# Patient Record
Sex: FEMALE | Race: WHITE | Employment: OTHER | URBAN - METROPOLITAN AREA
[De-identification: names, ages, dates, MRNs, and addresses within clinical notes are randomized per-mention and may not be internally consistent; named-entity substitution may affect disease eponyms.]

---

## 2017-01-11 RX ORDER — FERROUS SULFATE 325(65) MG
TABLET ORAL
Qty: 56 TABLET | Refills: 3 | Status: SHIPPED | OUTPATIENT
Start: 2017-01-11 | End: 2017-03-12

## 2017-01-17 RX ORDER — OMEPRAZOLE 40 MG/1
40 CAPSULE, DELAYED RELEASE ORAL DAILY
Qty: 30 CAPSULE | Refills: 5 | Status: SHIPPED | OUTPATIENT
Start: 2017-01-17 | End: 2017-01-19 | Stop reason: SDUPTHER

## 2017-01-19 RX ORDER — MECLIZINE HCL 12.5 MG/1
12.5 TABLET ORAL 3 TIMES DAILY PRN
Qty: 30 TABLET | Refills: 0 | Status: SHIPPED | OUTPATIENT
Start: 2017-01-19 | End: 2018-06-18 | Stop reason: CLARIF

## 2017-01-19 RX ORDER — OMEPRAZOLE 40 MG/1
40 CAPSULE, DELAYED RELEASE ORAL DAILY
Qty: 30 CAPSULE | Refills: 5 | Status: SHIPPED | OUTPATIENT
Start: 2017-01-19 | End: 2017-04-07 | Stop reason: SDUPTHER

## 2017-01-19 RX ORDER — NICOTINE 21 MG/24HR
1 PATCH, TRANSDERMAL 24 HOURS TRANSDERMAL EVERY 24 HOURS
Qty: 30 PATCH | Refills: 5 | Status: SHIPPED | OUTPATIENT
Start: 2017-01-19 | End: 2017-04-07

## 2017-02-13 RX ORDER — ERGOCALCIFEROL 1.25 MG/1
50000 CAPSULE ORAL WEEKLY
Qty: 13 CAPSULE | Refills: 0 | Status: SHIPPED | OUTPATIENT
Start: 2017-02-13 | End: 2017-03-12

## 2017-03-10 RX ORDER — BUDESONIDE AND FORMOTEROL FUMARATE DIHYDRATE 160; 4.5 UG/1; UG/1
AEROSOL RESPIRATORY (INHALATION)
Qty: 1 INHALER | Refills: 5 | Status: SHIPPED | OUTPATIENT
Start: 2017-03-10 | End: 2017-09-01 | Stop reason: SDUPTHER

## 2017-03-14 RX ORDER — ROPINIROLE 2 MG/1
TABLET, FILM COATED ORAL
Qty: 56 TABLET | Refills: 5 | Status: SHIPPED | OUTPATIENT
Start: 2017-03-14 | End: 2017-04-11 | Stop reason: SDUPTHER

## 2017-03-21 ENCOUNTER — OFFICE VISIT (OUTPATIENT)
Dept: FAMILY MEDICINE CLINIC | Age: 57
End: 2017-03-21

## 2017-03-21 VITALS
SYSTOLIC BLOOD PRESSURE: 110 MMHG | DIASTOLIC BLOOD PRESSURE: 52 MMHG | WEIGHT: 159.8 LBS | OXYGEN SATURATION: 98 % | HEART RATE: 72 BPM | BODY MASS INDEX: 31.21 KG/M2

## 2017-03-21 DIAGNOSIS — Z72.0 TOBACCO ABUSE: ICD-10-CM

## 2017-03-21 DIAGNOSIS — E11.00 UNCONTROLLED TYPE 2 DIABETES MELLITUS WITH HYPEROSMOLARITY WITHOUT COMA, WITHOUT LONG-TERM CURRENT USE OF INSULIN (HCC): ICD-10-CM

## 2017-03-21 DIAGNOSIS — I10 ESSENTIAL HYPERTENSION: ICD-10-CM

## 2017-03-21 DIAGNOSIS — G43.109 MIGRAINE WITH AURA AND WITHOUT STATUS MIGRAINOSUS, NOT INTRACTABLE: Primary | ICD-10-CM

## 2017-03-21 DIAGNOSIS — E03.9 ACQUIRED HYPOTHYROIDISM: ICD-10-CM

## 2017-03-21 LAB
ANION GAP SERPL CALCULATED.3IONS-SCNC: 15 MMOL/L (ref 3–16)
BUN BLDV-MCNC: 11 MG/DL (ref 7–20)
CALCIUM SERPL-MCNC: 10.2 MG/DL (ref 8.3–10.6)
CHLORIDE BLD-SCNC: 97 MMOL/L (ref 99–110)
CO2: 24 MMOL/L (ref 21–32)
CREAT SERPL-MCNC: 0.7 MG/DL (ref 0.6–1.1)
GFR AFRICAN AMERICAN: >60
GFR NON-AFRICAN AMERICAN: >60
GLUCOSE BLD-MCNC: 96 MG/DL (ref 70–99)
HCT VFR BLD CALC: 39.4 % (ref 36–48)
HEMOGLOBIN: 12.9 G/DL (ref 12–16)
MCH RBC QN AUTO: 29.6 PG (ref 26–34)
MCHC RBC AUTO-ENTMCNC: 32.7 G/DL (ref 31–36)
MCV RBC AUTO: 90.4 FL (ref 80–100)
PDW BLD-RTO: 13.5 % (ref 12.4–15.4)
PLATELET # BLD: 357 K/UL (ref 135–450)
PMV BLD AUTO: 7.6 FL (ref 5–10.5)
POTASSIUM SERPL-SCNC: 4.9 MMOL/L (ref 3.5–5.1)
RBC # BLD: 4.36 M/UL (ref 4–5.2)
SODIUM BLD-SCNC: 136 MMOL/L (ref 136–145)
TSH SERPL DL<=0.05 MIU/L-ACNC: 3.49 UIU/ML (ref 0.27–4.2)
WBC # BLD: 9.3 K/UL (ref 4–11)

## 2017-03-21 PROCEDURE — 99214 OFFICE O/P EST MOD 30 MIN: CPT | Performed by: INTERNAL MEDICINE

## 2017-03-21 PROCEDURE — 36415 COLL VENOUS BLD VENIPUNCTURE: CPT | Performed by: INTERNAL MEDICINE

## 2017-03-21 RX ORDER — AMLODIPINE BESYLATE 2.5 MG/1
2.5 TABLET ORAL DAILY
Qty: 30 TABLET | Refills: 3 | Status: SHIPPED | OUTPATIENT
Start: 2017-03-21 | End: 2017-06-09 | Stop reason: SDUPTHER

## 2017-03-21 RX ORDER — LEVOTHYROXINE SODIUM 0.07 MG/1
TABLET ORAL
Qty: 90 TABLET | Refills: 0 | Status: SHIPPED | OUTPATIENT
Start: 2017-03-21 | End: 2017-06-26 | Stop reason: SDUPTHER

## 2017-03-21 ASSESSMENT — ENCOUNTER SYMPTOMS
GASTROINTESTINAL NEGATIVE: 1
ALLERGIC/IMMUNOLOGIC NEGATIVE: 1
COUGH: 1

## 2017-03-22 LAB
ESTIMATED AVERAGE GLUCOSE: 171.4 MG/DL
HBA1C MFR BLD: 7.6 %

## 2017-04-05 RX ORDER — PRAVASTATIN SODIUM 40 MG
40 TABLET ORAL DAILY
Qty: 30 TABLET | Refills: 1 | Status: SHIPPED | OUTPATIENT
Start: 2017-04-05 | End: 2017-06-26 | Stop reason: SDUPTHER

## 2017-04-07 RX ORDER — OMEPRAZOLE 40 MG/1
40 CAPSULE, DELAYED RELEASE ORAL 2 TIMES DAILY
Qty: 60 CAPSULE | Refills: 5 | Status: SHIPPED | OUTPATIENT
Start: 2017-04-07 | End: 2018-01-31 | Stop reason: SDUPTHER

## 2017-04-11 ENCOUNTER — OFFICE VISIT (OUTPATIENT)
Dept: PULMONOLOGY | Age: 57
End: 2017-04-11

## 2017-04-11 VITALS
TEMPERATURE: 98.1 F | SYSTOLIC BLOOD PRESSURE: 136 MMHG | HEIGHT: 60 IN | DIASTOLIC BLOOD PRESSURE: 64 MMHG | BODY MASS INDEX: 31.49 KG/M2 | OXYGEN SATURATION: 98 % | WEIGHT: 160.4 LBS | HEART RATE: 72 BPM | RESPIRATION RATE: 14 BRPM

## 2017-04-11 DIAGNOSIS — J43.9 ACUTE EXACERBATION OF EMPHYSEMA (HCC): Primary | ICD-10-CM

## 2017-04-11 PROCEDURE — 99214 OFFICE O/P EST MOD 30 MIN: CPT | Performed by: INTERNAL MEDICINE

## 2017-04-11 RX ORDER — ROPINIROLE 2 MG/1
TABLET, FILM COATED ORAL
Qty: 60 TABLET | Refills: 5 | Status: SHIPPED | OUTPATIENT
Start: 2017-04-11 | End: 2018-01-03 | Stop reason: SDUPTHER

## 2017-04-11 RX ORDER — PREDNISONE 10 MG/1
TABLET ORAL
Qty: 30 TABLET | Refills: 0 | Status: SHIPPED | OUTPATIENT
Start: 2017-04-11 | End: 2017-08-21

## 2017-04-11 RX ORDER — DOXYCYCLINE HYCLATE 100 MG
100 TABLET ORAL 2 TIMES DAILY
Qty: 20 TABLET | Refills: 0 | Status: SHIPPED | OUTPATIENT
Start: 2017-04-11 | End: 2017-04-21

## 2017-05-12 ENCOUNTER — OFFICE VISIT (OUTPATIENT)
Dept: FAMILY MEDICINE CLINIC | Age: 57
End: 2017-05-12

## 2017-05-12 VITALS
SYSTOLIC BLOOD PRESSURE: 154 MMHG | DIASTOLIC BLOOD PRESSURE: 70 MMHG | BODY MASS INDEX: 31.79 KG/M2 | HEART RATE: 72 BPM | WEIGHT: 162.8 LBS | RESPIRATION RATE: 18 BRPM

## 2017-05-12 DIAGNOSIS — E78.2 MIXED HYPERLIPIDEMIA: ICD-10-CM

## 2017-05-12 DIAGNOSIS — F33.1 MODERATE EPISODE OF RECURRENT MAJOR DEPRESSIVE DISORDER (HCC): ICD-10-CM

## 2017-05-12 DIAGNOSIS — J43.2 CENTRILOBULAR EMPHYSEMA (HCC): ICD-10-CM

## 2017-05-12 DIAGNOSIS — E03.9 ACQUIRED HYPOTHYROIDISM: ICD-10-CM

## 2017-05-12 DIAGNOSIS — I77.9 BILATERAL CAROTID ARTERY DISEASE (HCC): ICD-10-CM

## 2017-05-12 DIAGNOSIS — E11.00 UNCONTROLLED TYPE 2 DIABETES MELLITUS WITH HYPEROSMOLARITY WITHOUT COMA, WITHOUT LONG-TERM CURRENT USE OF INSULIN (HCC): Primary | ICD-10-CM

## 2017-05-12 DIAGNOSIS — I10 ESSENTIAL HYPERTENSION: ICD-10-CM

## 2017-05-12 LAB
ALBUMIN SERPL-MCNC: 4.6 G/DL (ref 3.4–5)
ALP BLD-CCNC: 70 U/L (ref 40–129)
ALT SERPL-CCNC: 14 U/L (ref 10–40)
ANION GAP SERPL CALCULATED.3IONS-SCNC: 13 MMOL/L (ref 3–16)
AST SERPL-CCNC: 13 U/L (ref 15–37)
BILIRUB SERPL-MCNC: <0.2 MG/DL (ref 0–1)
BILIRUBIN DIRECT: <0.2 MG/DL (ref 0–0.3)
BILIRUBIN, INDIRECT: ABNORMAL MG/DL (ref 0–1)
BUN BLDV-MCNC: 12 MG/DL (ref 7–20)
CALCIUM SERPL-MCNC: 9.8 MG/DL (ref 8.3–10.6)
CHLORIDE BLD-SCNC: 101 MMOL/L (ref 99–110)
CHOLESTEROL, TOTAL: 225 MG/DL (ref 0–199)
CO2: 24 MMOL/L (ref 21–32)
CREAT SERPL-MCNC: 0.8 MG/DL (ref 0.6–1.1)
GFR AFRICAN AMERICAN: >60
GFR NON-AFRICAN AMERICAN: >60
GLUCOSE BLD-MCNC: 123 MG/DL (ref 70–99)
HDLC SERPL-MCNC: 58 MG/DL (ref 40–60)
LDL CHOLESTEROL CALCULATED: ABNORMAL MG/DL
LDL CHOLESTEROL DIRECT: 119 MG/DL
POTASSIUM SERPL-SCNC: 4.6 MMOL/L (ref 3.5–5.1)
SODIUM BLD-SCNC: 138 MMOL/L (ref 136–145)
TOTAL PROTEIN: 7.1 G/DL (ref 6.4–8.2)
TRIGL SERPL-MCNC: 352 MG/DL (ref 0–150)
VLDLC SERPL CALC-MCNC: ABNORMAL MG/DL

## 2017-05-12 PROCEDURE — 36415 COLL VENOUS BLD VENIPUNCTURE: CPT | Performed by: INTERNAL MEDICINE

## 2017-05-12 PROCEDURE — 99214 OFFICE O/P EST MOD 30 MIN: CPT | Performed by: INTERNAL MEDICINE

## 2017-05-12 RX ORDER — LOSARTAN POTASSIUM 50 MG/1
50 TABLET ORAL DAILY
Qty: 30 TABLET | Refills: 5 | Status: SHIPPED | OUTPATIENT
Start: 2017-05-12 | End: 2017-06-27 | Stop reason: SDUPTHER

## 2017-05-12 ASSESSMENT — ENCOUNTER SYMPTOMS
BACK PAIN: 1
RESPIRATORY NEGATIVE: 1
RHINORRHEA: 1
SINUS PRESSURE: 1
ALLERGIC/IMMUNOLOGIC NEGATIVE: 1

## 2017-05-13 LAB
ESTIMATED AVERAGE GLUCOSE: 139.9 MG/DL
HBA1C MFR BLD: 6.5 %

## 2017-05-19 ENCOUNTER — OFFICE VISIT (OUTPATIENT)
Dept: PULMONOLOGY | Age: 57
End: 2017-05-19

## 2017-05-19 VITALS
HEIGHT: 60 IN | DIASTOLIC BLOOD PRESSURE: 62 MMHG | HEART RATE: 68 BPM | OXYGEN SATURATION: 98 % | WEIGHT: 161 LBS | TEMPERATURE: 98 F | BODY MASS INDEX: 31.61 KG/M2 | SYSTOLIC BLOOD PRESSURE: 112 MMHG

## 2017-05-19 DIAGNOSIS — J43.9 PULMONARY EMPHYSEMA, UNSPECIFIED EMPHYSEMA TYPE (HCC): Primary | ICD-10-CM

## 2017-05-19 DIAGNOSIS — G25.81 RLS (RESTLESS LEGS SYNDROME): ICD-10-CM

## 2017-05-19 PROCEDURE — 99214 OFFICE O/P EST MOD 30 MIN: CPT | Performed by: INTERNAL MEDICINE

## 2017-06-11 RX ORDER — AMLODIPINE BESYLATE 5 MG/1
5 TABLET ORAL DAILY
Qty: 30 TABLET | Refills: 5 | Status: SHIPPED | OUTPATIENT
Start: 2017-06-11 | End: 2020-10-19

## 2017-06-26 RX ORDER — PRAVASTATIN SODIUM 40 MG
TABLET ORAL
Qty: 30 TABLET | Refills: 5 | Status: SHIPPED | OUTPATIENT
Start: 2017-06-26 | End: 2018-01-31 | Stop reason: SDUPTHER

## 2017-06-26 RX ORDER — FERROUS SULFATE 325(65) MG
TABLET ORAL
Qty: 56 TABLET | OUTPATIENT
Start: 2017-06-26

## 2017-06-26 RX ORDER — ERGOCALCIFEROL 1.25 MG/1
CAPSULE ORAL
Qty: 4 CAPSULE | Refills: 5 | Status: SHIPPED | OUTPATIENT
Start: 2017-06-26 | End: 2017-08-21

## 2017-06-26 RX ORDER — LEVOTHYROXINE SODIUM 0.07 MG/1
TABLET ORAL
Qty: 30 TABLET | Refills: 5 | Status: SHIPPED | OUTPATIENT
Start: 2017-06-26 | End: 2018-06-06 | Stop reason: SDUPTHER

## 2017-06-27 ENCOUNTER — OFFICE VISIT (OUTPATIENT)
Dept: FAMILY MEDICINE CLINIC | Age: 57
End: 2017-06-27

## 2017-06-27 VITALS
RESPIRATION RATE: 18 BRPM | HEART RATE: 75 BPM | DIASTOLIC BLOOD PRESSURE: 72 MMHG | HEIGHT: 60 IN | WEIGHT: 163.2 LBS | OXYGEN SATURATION: 98 % | SYSTOLIC BLOOD PRESSURE: 142 MMHG | BODY MASS INDEX: 32.04 KG/M2

## 2017-06-27 DIAGNOSIS — E11.00 UNCONTROLLED TYPE 2 DIABETES MELLITUS WITH HYPEROSMOLARITY WITHOUT COMA, WITHOUT LONG-TERM CURRENT USE OF INSULIN (HCC): ICD-10-CM

## 2017-06-27 DIAGNOSIS — M54.2 NECK PAIN OF OVER 3 MONTHS DURATION: ICD-10-CM

## 2017-06-27 DIAGNOSIS — Z72.0 TOBACCO ABUSE: ICD-10-CM

## 2017-06-27 DIAGNOSIS — I10 ESSENTIAL HYPERTENSION: Primary | ICD-10-CM

## 2017-06-27 PROCEDURE — 99214 OFFICE O/P EST MOD 30 MIN: CPT | Performed by: INTERNAL MEDICINE

## 2017-06-27 RX ORDER — LOSARTAN POTASSIUM 100 MG/1
100 TABLET ORAL DAILY
Qty: 30 TABLET | Refills: 5 | Status: SHIPPED | OUTPATIENT
Start: 2017-06-27 | End: 2018-06-18 | Stop reason: CLARIF

## 2017-06-27 RX ORDER — DIPHENHYD/PHENYLEPH/ACETAMINOP 12.5-5-325
TABLET ORAL
Qty: 1 KIT | Refills: 0 | Status: SHIPPED | OUTPATIENT
Start: 2017-06-27 | End: 2017-12-12

## 2017-06-27 ASSESSMENT — ENCOUNTER SYMPTOMS
RESPIRATORY NEGATIVE: 1
ALLERGIC/IMMUNOLOGIC NEGATIVE: 1

## 2017-07-10 ENCOUNTER — HOSPITAL ENCOUNTER (OUTPATIENT)
Dept: PHYSICAL THERAPY | Age: 57
Discharge: HOME | End: 2017-07-31
Admitting: INTERNAL MEDICINE

## 2017-07-10 ASSESSMENT — PAIN DESCRIPTION - LOCATION: LOCATION: NECK

## 2017-07-10 ASSESSMENT — PAIN DESCRIPTION - ORIENTATION: ORIENTATION: RIGHT

## 2017-07-10 ASSESSMENT — PAIN SCALES - GENERAL: PAINLEVEL_OUTOF10: 4

## 2017-07-10 ASSESSMENT — PAIN DESCRIPTION - PAIN TYPE: TYPE: CHRONIC PAIN

## 2017-07-10 ASSESSMENT — PAIN DESCRIPTION - DESCRIPTORS: DESCRIPTORS: SHOOTING

## 2017-07-13 ENCOUNTER — HOSPITAL ENCOUNTER (OUTPATIENT)
Dept: PHYSICAL THERAPY | Age: 57
Discharge: HOME | End: 2017-07-13
Admitting: INTERNAL MEDICINE

## 2017-07-17 ENCOUNTER — HOSPITAL ENCOUNTER (OUTPATIENT)
Dept: PHYSICAL THERAPY | Age: 57
Discharge: HOME | End: 2017-07-17
Admitting: INTERNAL MEDICINE

## 2017-07-19 ENCOUNTER — HOSPITAL ENCOUNTER (OUTPATIENT)
Dept: PHYSICAL THERAPY | Age: 57
Discharge: HOME | End: 2017-07-19
Admitting: INTERNAL MEDICINE

## 2017-08-08 ENCOUNTER — TELEPHONE (OUTPATIENT)
Dept: FAMILY MEDICINE CLINIC | Age: 57
End: 2017-08-08

## 2017-08-08 DIAGNOSIS — G45.9 TRANSIENT CEREBRAL ISCHEMIA, UNSPECIFIED TYPE: ICD-10-CM

## 2017-08-08 DIAGNOSIS — M54.2 NECK PAIN OF OVER 3 MONTHS DURATION: ICD-10-CM

## 2017-08-08 DIAGNOSIS — R51.9 NONINTRACTABLE HEADACHE, UNSPECIFIED CHRONICITY PATTERN, UNSPECIFIED HEADACHE TYPE: Primary | ICD-10-CM

## 2017-08-21 ENCOUNTER — INITIAL CONSULT (OUTPATIENT)
Dept: NEUROLOGY | Age: 57
End: 2017-08-21

## 2017-08-21 VITALS
BODY MASS INDEX: 31.61 KG/M2 | HEART RATE: 77 BPM | DIASTOLIC BLOOD PRESSURE: 67 MMHG | WEIGHT: 161 LBS | HEIGHT: 60 IN | SYSTOLIC BLOOD PRESSURE: 128 MMHG | OXYGEN SATURATION: 98 %

## 2017-08-21 DIAGNOSIS — F34.1 DYSTHYMIA: ICD-10-CM

## 2017-08-21 DIAGNOSIS — G44.86 HEADACHE, CERVICOGENIC: ICD-10-CM

## 2017-08-21 DIAGNOSIS — M50.920 CERVICAL DISC DISORDER OF MID-CERVICAL REGION: ICD-10-CM

## 2017-08-21 DIAGNOSIS — I10 HTN (HYPERTENSION), BENIGN: ICD-10-CM

## 2017-08-21 DIAGNOSIS — G44.52 NEW PERSISTENT DAILY HEADACHE: Primary | ICD-10-CM

## 2017-08-21 PROCEDURE — 99215 OFFICE O/P EST HI 40 MIN: CPT | Performed by: PSYCHIATRY & NEUROLOGY

## 2017-08-21 ASSESSMENT — ENCOUNTER SYMPTOMS
ABDOMINAL PAIN: 1
NAUSEA: 1
DOUBLE VISION: 0
BLURRED VISION: 0
HEMOPTYSIS: 0
DIARRHEA: 1
BACK PAIN: 1
PHOTOPHOBIA: 0
COUGH: 0
VOMITING: 0
CONSTIPATION: 0

## 2017-08-29 ENCOUNTER — HOSPITAL ENCOUNTER (OUTPATIENT)
Dept: MRI IMAGING | Age: 57
Discharge: HOME | End: 2017-08-29
Attending: PSYCHIATRY & NEUROLOGY | Admitting: PSYCHIATRY & NEUROLOGY

## 2017-08-29 DIAGNOSIS — G44.86 HEADACHE, CERVICOGENIC: ICD-10-CM

## 2017-08-29 DIAGNOSIS — M50.920 CERVICAL DISC DISORDER OF MID-CERVICAL REGION: ICD-10-CM

## 2017-08-29 DIAGNOSIS — G44.52 NEW PERSISTENT DAILY HEADACHE: ICD-10-CM

## 2017-08-29 DIAGNOSIS — G44.52 NEW DAILY PERSISTENT HEADACHE (NDPH): ICD-10-CM

## 2017-09-01 RX ORDER — BUDESONIDE AND FORMOTEROL FUMARATE DIHYDRATE 160; 4.5 UG/1; UG/1
AEROSOL RESPIRATORY (INHALATION)
Qty: 1 INHALER | Refills: 5 | Status: SHIPPED | OUTPATIENT
Start: 2017-09-01 | End: 2018-03-23 | Stop reason: SDUPTHER

## 2017-09-20 ENCOUNTER — HOSPITAL ENCOUNTER (OUTPATIENT)
Dept: MAMMOGRAPHY | Age: 57
Discharge: HOME | End: 2017-09-20
Attending: INTERNAL MEDICINE | Admitting: INTERNAL MEDICINE

## 2017-09-20 DIAGNOSIS — Z12.31 ENCOUNTER FOR SCREENING MAMMOGRAM FOR MALIGNANT NEOPLASM OF BREAST: ICD-10-CM

## 2017-09-25 ENCOUNTER — OFFICE VISIT (OUTPATIENT)
Dept: NEUROLOGY | Age: 57
End: 2017-09-25

## 2017-09-25 ENCOUNTER — TELEPHONE (OUTPATIENT)
Dept: FAMILY MEDICINE CLINIC | Age: 57
End: 2017-09-25

## 2017-09-25 VITALS
OXYGEN SATURATION: 98 % | HEIGHT: 61 IN | BODY MASS INDEX: 30.96 KG/M2 | HEART RATE: 66 BPM | WEIGHT: 164 LBS | SYSTOLIC BLOOD PRESSURE: 146 MMHG | DIASTOLIC BLOOD PRESSURE: 69 MMHG

## 2017-09-25 DIAGNOSIS — E78.5 DYSLIPIDEMIA: ICD-10-CM

## 2017-09-25 DIAGNOSIS — I10 HTN (HYPERTENSION), BENIGN: ICD-10-CM

## 2017-09-25 DIAGNOSIS — G44.52 NEW PERSISTENT DAILY HEADACHE: Primary | ICD-10-CM

## 2017-09-25 DIAGNOSIS — G44.86 HEADACHE, CERVICOGENIC: ICD-10-CM

## 2017-09-25 DIAGNOSIS — F34.1 DYSTHYMIA: ICD-10-CM

## 2017-09-25 PROCEDURE — 99214 OFFICE O/P EST MOD 30 MIN: CPT | Performed by: PSYCHIATRY & NEUROLOGY

## 2017-09-25 RX ORDER — TIZANIDINE 4 MG/1
4 TABLET ORAL NIGHTLY
Qty: 30 TABLET | Refills: 1 | Status: SHIPPED | OUTPATIENT
Start: 2017-09-25 | End: 2018-06-18 | Stop reason: CLARIF

## 2017-09-25 ASSESSMENT — ENCOUNTER SYMPTOMS
DOUBLE VISION: 0
COUGH: 0
NAUSEA: 0
VOMITING: 0
BACK PAIN: 1
ABDOMINAL PAIN: 0

## 2017-09-28 ENCOUNTER — OFFICE VISIT (OUTPATIENT)
Dept: FAMILY MEDICINE CLINIC | Age: 57
End: 2017-09-28

## 2017-09-28 VITALS
RESPIRATION RATE: 16 BRPM | HEIGHT: 61 IN | DIASTOLIC BLOOD PRESSURE: 74 MMHG | SYSTOLIC BLOOD PRESSURE: 132 MMHG | BODY MASS INDEX: 31.15 KG/M2 | OXYGEN SATURATION: 98 % | HEART RATE: 67 BPM | WEIGHT: 165 LBS

## 2017-09-28 DIAGNOSIS — E11.00 UNCONTROLLED TYPE 2 DIABETES MELLITUS WITH HYPEROSMOLARITY WITHOUT COMA, WITHOUT LONG-TERM CURRENT USE OF INSULIN (HCC): Primary | ICD-10-CM

## 2017-09-28 DIAGNOSIS — M54.2 NECK PAIN OF OVER 3 MONTHS DURATION: ICD-10-CM

## 2017-09-28 DIAGNOSIS — G89.29 CHRONIC HIP PAIN, UNSPECIFIED LATERALITY: ICD-10-CM

## 2017-09-28 DIAGNOSIS — E03.9 ACQUIRED HYPOTHYROIDISM: ICD-10-CM

## 2017-09-28 DIAGNOSIS — M25.559 CHRONIC HIP PAIN, UNSPECIFIED LATERALITY: ICD-10-CM

## 2017-09-28 DIAGNOSIS — M54.50 CHRONIC BILATERAL LOW BACK PAIN WITHOUT SCIATICA: ICD-10-CM

## 2017-09-28 DIAGNOSIS — J43.2 CENTRILOBULAR EMPHYSEMA (HCC): ICD-10-CM

## 2017-09-28 DIAGNOSIS — I77.9 BILATERAL CAROTID ARTERY DISEASE (HCC): ICD-10-CM

## 2017-09-28 DIAGNOSIS — G89.29 CHRONIC BILATERAL LOW BACK PAIN WITHOUT SCIATICA: ICD-10-CM

## 2017-09-28 DIAGNOSIS — F33.1 MODERATE EPISODE OF RECURRENT MAJOR DEPRESSIVE DISORDER (HCC): ICD-10-CM

## 2017-09-28 DIAGNOSIS — I10 ESSENTIAL HYPERTENSION: ICD-10-CM

## 2017-09-28 LAB
ANION GAP SERPL CALCULATED.3IONS-SCNC: 13 MMOL/L (ref 3–16)
BUN BLDV-MCNC: 7 MG/DL (ref 7–20)
CALCIUM SERPL-MCNC: 9.8 MG/DL (ref 8.3–10.6)
CHLORIDE BLD-SCNC: 101 MMOL/L (ref 99–110)
CO2: 24 MMOL/L (ref 21–32)
CREAT SERPL-MCNC: 0.6 MG/DL (ref 0.6–1.1)
GFR AFRICAN AMERICAN: >60
GFR NON-AFRICAN AMERICAN: >60
GLUCOSE BLD-MCNC: 113 MG/DL (ref 70–99)
POTASSIUM SERPL-SCNC: 4.7 MMOL/L (ref 3.5–5.1)
SODIUM BLD-SCNC: 138 MMOL/L (ref 136–145)

## 2017-09-28 PROCEDURE — 99214 OFFICE O/P EST MOD 30 MIN: CPT | Performed by: INTERNAL MEDICINE

## 2017-09-28 PROCEDURE — 36415 COLL VENOUS BLD VENIPUNCTURE: CPT | Performed by: INTERNAL MEDICINE

## 2017-09-28 PROCEDURE — 96160 PT-FOCUSED HLTH RISK ASSMT: CPT | Performed by: INTERNAL MEDICINE

## 2017-09-28 ASSESSMENT — PATIENT HEALTH QUESTIONNAIRE - PHQ9
9. THOUGHTS THAT YOU WOULD BE BETTER OFF DEAD, OR OF HURTING YOURSELF: 0
SUM OF ALL RESPONSES TO PHQ QUESTIONS 1-9: 15
7. TROUBLE CONCENTRATING ON THINGS, SUCH AS READING THE NEWSPAPER OR WATCHING TELEVISION: 0
2. FEELING DOWN, DEPRESSED OR HOPELESS: 3
5. POOR APPETITE OR OVEREATING: 3
10. IF YOU CHECKED OFF ANY PROBLEMS, HOW DIFFICULT HAVE THESE PROBLEMS MADE IT FOR YOU TO DO YOUR WORK, TAKE CARE OF THINGS AT HOME, OR GET ALONG WITH OTHER PEOPLE: 2
4. FEELING TIRED OR HAVING LITTLE ENERGY: 3
1. LITTLE INTEREST OR PLEASURE IN DOING THINGS: 3
3. TROUBLE FALLING OR STAYING ASLEEP: 3
6. FEELING BAD ABOUT YOURSELF - OR THAT YOU ARE A FAILURE OR HAVE LET YOURSELF OR YOUR FAMILY DOWN: 0
8. MOVING OR SPEAKING SO SLOWLY THAT OTHER PEOPLE COULD HAVE NOTICED. OR THE OPPOSITE, BEING SO FIGETY OR RESTLESS THAT YOU HAVE BEEN MOVING AROUND A LOT MORE THAN USUAL: 0
SUM OF ALL RESPONSES TO PHQ9 QUESTIONS 1 & 2: 6

## 2017-09-28 ASSESSMENT — ENCOUNTER SYMPTOMS
RESPIRATORY NEGATIVE: 1
ALLERGIC/IMMUNOLOGIC NEGATIVE: 1

## 2017-09-29 LAB
ESTIMATED AVERAGE GLUCOSE: 154.2 MG/DL
HBA1C MFR BLD: 7 %

## 2017-10-03 NOTE — TELEPHONE ENCOUNTER
Left message for patient to return my call. I need the pain mgmt office phone number and office location to note in patient's record. Only a fax number was provided.

## 2017-11-07 ENCOUNTER — OFFICE VISIT (OUTPATIENT)
Dept: FAMILY MEDICINE CLINIC | Age: 57
End: 2017-11-07

## 2017-11-07 VITALS
HEART RATE: 80 BPM | WEIGHT: 168 LBS | DIASTOLIC BLOOD PRESSURE: 68 MMHG | OXYGEN SATURATION: 98 % | BODY MASS INDEX: 31.74 KG/M2 | SYSTOLIC BLOOD PRESSURE: 116 MMHG

## 2017-11-07 DIAGNOSIS — Z11.4 SCREENING FOR HIV WITHOUT PRESENCE OF RISK FACTORS: ICD-10-CM

## 2017-11-07 DIAGNOSIS — I10 ESSENTIAL HYPERTENSION: ICD-10-CM

## 2017-11-07 DIAGNOSIS — Z23 NEED FOR INFLUENZA VACCINATION: ICD-10-CM

## 2017-11-07 DIAGNOSIS — J30.9 ALLERGIC RHINITIS, UNSPECIFIED CHRONICITY, UNSPECIFIED SEASONALITY, UNSPECIFIED TRIGGER: ICD-10-CM

## 2017-11-07 DIAGNOSIS — E03.9 ACQUIRED HYPOTHYROIDISM: ICD-10-CM

## 2017-11-07 DIAGNOSIS — Z11.59 NEED FOR HEPATITIS C SCREENING TEST: ICD-10-CM

## 2017-11-07 LAB
CREATININE URINE: 43.4 MG/DL (ref 28–259)
MICROALBUMIN UR-MCNC: <1.2 MG/DL
MICROALBUMIN/CREAT UR-RTO: NORMAL MG/G (ref 0–30)

## 2017-11-07 PROCEDURE — 3014F SCREEN MAMMO DOC REV: CPT | Performed by: FAMILY MEDICINE

## 2017-11-07 PROCEDURE — 4004F PT TOBACCO SCREEN RCVD TLK: CPT | Performed by: FAMILY MEDICINE

## 2017-11-07 PROCEDURE — G8484 FLU IMMUNIZE NO ADMIN: HCPCS | Performed by: FAMILY MEDICINE

## 2017-11-07 PROCEDURE — G8427 DOCREV CUR MEDS BY ELIG CLIN: HCPCS | Performed by: FAMILY MEDICINE

## 2017-11-07 PROCEDURE — 3045F PR MOST RECENT HEMOGLOBIN A1C LEVEL 7.0-9.0%: CPT | Performed by: FAMILY MEDICINE

## 2017-11-07 PROCEDURE — 99214 OFFICE O/P EST MOD 30 MIN: CPT | Performed by: FAMILY MEDICINE

## 2017-11-07 PROCEDURE — 90471 IMMUNIZATION ADMIN: CPT | Performed by: FAMILY MEDICINE

## 2017-11-07 PROCEDURE — 3017F COLORECTAL CA SCREEN DOC REV: CPT | Performed by: FAMILY MEDICINE

## 2017-11-07 PROCEDURE — G8417 CALC BMI ABV UP PARAM F/U: HCPCS | Performed by: FAMILY MEDICINE

## 2017-11-07 PROCEDURE — 90630 INFLUENZA, QUADV, 18-64 YRS, ID, PF, MICRO INJ, 0.1ML (FLUZONE QUADV, PF): CPT | Performed by: FAMILY MEDICINE

## 2017-11-07 RX ORDER — LORATADINE 10 MG/1
10 TABLET ORAL DAILY
Qty: 60 TABLET | Refills: 1 | Status: SHIPPED | OUTPATIENT
Start: 2017-11-07 | End: 2018-05-31 | Stop reason: SDUPTHER

## 2017-11-07 RX ORDER — METFORMIN HYDROCHLORIDE 500 MG/1
500 TABLET, EXTENDED RELEASE ORAL
Qty: 30 TABLET | Refills: 3 | Status: SHIPPED | OUTPATIENT
Start: 2017-11-07 | End: 2018-02-28 | Stop reason: SDUPTHER

## 2017-11-07 NOTE — PROGRESS NOTES
Vaccine Information Sheet, \"Influenza - Inactivated\"  given to Olive Amador, or parent/legal guardian of  Olive Amador and verbalized understanding. Patient responses:    Have you ever had a reaction to a flu vaccine? No  Are you able to eat eggs without adverse effects? Yes  Do you have any current illness? No  Have you ever had [place] Syndrome? No    Flu vaccine given per order. Please see immunization tab.

## 2017-11-07 NOTE — PATIENT INSTRUCTIONS
do not smoke more cigarettes, inhale them more often or more deeply, or place your fingertips over the holes in the filters. All of these actions will increase your nicotine intake, and the idea is to get your body used to functioning without nicotine. Cut Down the Number of Cigarettes You Smoke   Smoke only half of each cigarette. Each day, postpone the lighting of your first cigarette by one hour. Decide you'll only smoke during odd or even hours of the day. Decide beforehand how many cigarettes you'll smoke during the day. For each additional cigarette, give a dollar to your favorite jillian. Change your eating habits to help you cut down. For example, drink milk, which many people consider incompatible with smoking. End meals or snacks with something that won't lead to a cigarette. Reach for a glass of juice instead of a cigarette for a \"pick-me-up. \"   Remember: Cutting down can help you quit, but it's not a substitute for quitting. If you're down to about seven cigarettes a day, it's time to set your target quit date, and get ready to stick to it. Don't Smoke \"Automatically\"   Smoke only those cigarettes you really want. Catch yourself before you light up a cigarette out of pure habit. Don't empty your ashtrays. This will remind you of how many cigarettes you've smoked each day, and the sight and the smell of stale cigarettes butts will be very unpleasant. Make yourself aware of each cigarette by using the opposite hand or putting cigarettes in an unfamiliar location or a different pocket to break the automatic reach. If you light up many times during the day without even thinking about it, try to look in a mirror each time you put a match to your cigarette. You may decide you don't need it. Make Smoking Inconvenient   Stop buying cigarettes by the carton. Wait until one pack is empty before you buy another. Stop carrying cigarettes with you at home or at work.  Make them difficult to get

## 2017-11-07 NOTE — PROGRESS NOTES
Chief Complaint   Patient presents with    New Patient         HPI      56 y.o. female presents today to establish care. She has not been feeling well. Reports congestion in chest. Has had a cough productive of clear sputum X 4 days. Boyfriend has mold in house and her symptoms are worse there. While at home she feels better. She reports eye lacrimation, itching ears. Denies any fevers or chills  Hx of bipolar disorder follows with Irlanda Dent NP. Sees her every 3 months and a care manager every month. Hx of hypothyroidism on synthroid. Last TSH 3/21/17 wnl. Hx of diabetes was previously on metformin but had diarrhea, vomiting and abdominal pain and stopped the medication. A1C 7.0 9/2017  Hx of RLS on ropinirole which controls her symptoms  She follows with Dr. Umer Mcdonnell in gynecology  Tobacco use disorder currently smokes 1/2 ppd.  Has been smoking since 18  Patient Active Problem List   Diagnosis    TIA (transient ischemic attack)    Acquired hypothyroidism    Dyspnea    Cough    Tobacco abuse    Abnormal PFT    Centrilobular emphysema (HCC)    Excessive sleepiness    Abnormal finding on Pap smear, HPV DNA positive    Restless leg syndrome    Leg pain    Diabetic polyneuropathy (HCC)    Uncontrolled type 2 diabetes mellitus with hyperosmolarity without coma, without long-term current use of insulin (HCC)    Restless legs syndrome (RLS)    Low grade squamous intraepith lesion on cytologic smear vagina (lgsil)    Ovarian cyst, left    Fracture of coccyx (HCC)    Fracture of spinal vertebra    Hip pain    Cataract    Glaucoma    Viral upper respiratory tract infection    Migraine with aura and without status migrainosus, not intractable    Essential hypertension    Moderate episode of recurrent major depressive disorder (HCC)    Bilateral carotid artery disease (HCC)    Neck pain of over 3 months duration    New persistent daily headache    Cervical disc disorder of mid-cervical region    Headache, cervicogenic    HTN (hypertension), benign    Dysthymia    Uncontrolled type 2 diabetes mellitus with complication, without long-term current use of insulin (HCC)    Dyslipidemia     Past Medical History:   Diagnosis Date    Anxiety     [facility] [address]     Brain aneurysm     Zucarello    Closed angle glaucoma     COPD (chronic obstructive pulmonary disease) ([facility] [address] [facility]     Hyperlipidemia     Hypertension     Hypothyroidism     Migraines     Open-angle glaucoma of both eyes     RLS (restless legs syndrome)     Rotator cuff disorder     right shoulder    TIA (transient ischemic attack) 11    Tobacco use disorder     Type II or unspecified type diabetes mellitus without mention of complication, not stated as uncontrolled     diet controlled       Past Surgical History:   Procedure Laterality Date    CARPAL TUNNEL RELEASE Bilateral     right and left     SECTION      x 2    CHOLECYSTECTOMY      COLONOSCOPY      COLONOSCOPY  2016    diverticulosis    CYST REMOVAL  2006    hidradenitis?     EYE SURGERY Right 18    cataract    EYE SURGERY Left 16    cataract removal    GLAUCOMA SURGERY Bilateral     twice in right eye    HYSTERECTOMY      total    OTHER SURGICAL HISTORY  2015    left oopherectomy    ROTATOR CUFF REPAIR Right  and Rbinda 1 2013    x2    UPPER GASTROINTESTINAL ENDOSCOPY  2016    small bowel bx     Most Recent Immunizations   Administered Date(s) Administered    Influenza Virus Vaccine 2015    Influenza, Intradermal, Preservative free 10/17/2013    Influenza, Intradermal, Quadrivalent, Preservative Free 2017    Influenza, Quadv, 3 yrs and older, IM, Preservative Free 10/05/2016    Tdap (Boostrix, Adacel) 2013        Current Outpatient Prescriptions   Medication Sig Dispense Refill    metFORMIN (GLUCOPHAGE XR) 500 MG extended release tablet Take 1 tablet by mouth daily (with breakfast) 30 night      lamotrigine (LAMICTAL) 100 MG tablet Take 100 mg by mouth 2 times daily.  timolol (BETIMOL) 0.5 % ophthalmic solution 1 drop 2 times daily.  brimonidine (ALPHAGAN P) 0.15 % ophthalmic solution 1 drop 2 times daily.  tiZANidine (ZANAFLEX) 4 MG tablet Take 1 tablet by mouth nightly 30 tablet 1    meclizine (ANTIVERT) 12.5 MG tablet Take 1 tablet by mouth 3 times daily as needed for Dizziness 30 tablet 0     No current facility-administered medications for this visit.       Allergies   Allergen Reactions    Budesonide Anaphylaxis and Swelling     Throat swelled    Tiotropium Bromide Monohydrate Anaphylaxis and Rash    Albuterol Swelling    Cymbalta [Duloxetine Hcl] Other (See Comments) and Swelling     Throat swelled  suicidal  Throat swelling    Maxalt [Rizatriptan Benzoate]      Does not recall    Meloxicam Other (See Comments)     Unsure of Rx  crazy    Risperidone      Unsure of Rx    Rizatriptan Other (See Comments)     Pt unsure    Spiriva [Tiotropium Bromide Monohydrate] Swelling     Neck swelling    Topamax      \"didn't work\"    Topiramate Other (See Comments) and Swelling     unsure    Buspirone Nausea And Vomiting       Social History     Social History    Marital status:      Spouse name: N/A    Number of children: N/A    Years of education: 4     Occupational History    disabled      Social History Main Topics    Smoking status: Current Every Day Smoker     Packs/day: 1.00     Years: 38.00     Types: Cigarettes     Start date: 1/1/1978    Smokeless tobacco: Never Used    Alcohol use No    Drug use: No    Sexual activity: Not Currently     Partners: Male     Other Topics Concern    None     Social History Narrative    None     Family History   Problem Relation Age of Onset    Cancer Mother      colon; lung    Emphysema Mother     Hypertension Mother     Heart Disease Father     Heart Failure Father     Bipolar Disorder Sister     Osteoporosis

## 2017-11-08 LAB
HEPATITIS C ANTIBODY INTERPRETATION: NORMAL
HIV-1 AND HIV-2 ANTIBODIES: NORMAL

## 2017-11-08 ASSESSMENT — ENCOUNTER SYMPTOMS
EYE DISCHARGE: 1
SHORTNESS OF BREATH: 0

## 2017-11-21 ENCOUNTER — OFFICE VISIT (OUTPATIENT)
Dept: PULMONOLOGY | Age: 57
End: 2017-11-21

## 2017-11-21 VITALS
TEMPERATURE: 98.4 F | RESPIRATION RATE: 16 BRPM | BODY MASS INDEX: 32.59 KG/M2 | DIASTOLIC BLOOD PRESSURE: 64 MMHG | HEART RATE: 91 BPM | HEIGHT: 60 IN | SYSTOLIC BLOOD PRESSURE: 136 MMHG | OXYGEN SATURATION: 97 % | WEIGHT: 166 LBS

## 2017-11-21 DIAGNOSIS — J43.9 PULMONARY EMPHYSEMA, UNSPECIFIED EMPHYSEMA TYPE (HCC): Primary | ICD-10-CM

## 2017-11-21 DIAGNOSIS — G25.81 RLS (RESTLESS LEGS SYNDROME): ICD-10-CM

## 2017-11-21 PROCEDURE — G8484 FLU IMMUNIZE NO ADMIN: HCPCS | Performed by: INTERNAL MEDICINE

## 2017-11-21 PROCEDURE — 3014F SCREEN MAMMO DOC REV: CPT | Performed by: INTERNAL MEDICINE

## 2017-11-21 PROCEDURE — G8427 DOCREV CUR MEDS BY ELIG CLIN: HCPCS | Performed by: INTERNAL MEDICINE

## 2017-11-21 PROCEDURE — 99214 OFFICE O/P EST MOD 30 MIN: CPT | Performed by: INTERNAL MEDICINE

## 2017-11-21 PROCEDURE — G8926 SPIRO NO PERF OR DOC: HCPCS | Performed by: INTERNAL MEDICINE

## 2017-11-21 PROCEDURE — 3023F SPIROM DOC REV: CPT | Performed by: INTERNAL MEDICINE

## 2017-11-21 PROCEDURE — G8417 CALC BMI ABV UP PARAM F/U: HCPCS | Performed by: INTERNAL MEDICINE

## 2017-11-21 PROCEDURE — 4004F PT TOBACCO SCREEN RCVD TLK: CPT | Performed by: INTERNAL MEDICINE

## 2017-11-21 PROCEDURE — 3017F COLORECTAL CA SCREEN DOC REV: CPT | Performed by: INTERNAL MEDICINE

## 2017-11-21 NOTE — PROGRESS NOTES
stopping smoking. Has albuterol MDI and nebs  2.  3 mm Pulmonary Nodule right upper lobe has resolved  3. Restless legs syndrome: Requip 2 mg at bedtime and up to 2 mg earlier in day. She and I have discussed the risk of augmentation. 4.  Depression: treated at [facility] in [facility], has been long term treated with Venlafaxine   5. Tobacco abuse - down 1/2 ppd, using patches in effort to quit -- is trying to cut down. Nicotine gum given today. She is a candidate for lung cancer screening. She just turned 57 and we will offer low-dose CT at her next office visit.   6.  See me back in 6 months with same day PFT

## 2017-11-29 ENCOUNTER — TELEPHONE (OUTPATIENT)
Dept: FAMILY MEDICINE CLINIC | Age: 57
End: 2017-11-29

## 2017-11-29 NOTE — TELEPHONE ENCOUNTER
Patient is calling about the Metformin. Stated that even the half is still making her really sick to her stomach and she cannot take it. Asking for something else.

## 2017-12-12 ENCOUNTER — OFFICE VISIT (OUTPATIENT)
Dept: FAMILY MEDICINE CLINIC | Age: 57
End: 2017-12-12

## 2017-12-12 VITALS
OXYGEN SATURATION: 97 % | HEIGHT: 60 IN | SYSTOLIC BLOOD PRESSURE: 118 MMHG | TEMPERATURE: 97.9 F | WEIGHT: 170.4 LBS | HEART RATE: 69 BPM | BODY MASS INDEX: 33.45 KG/M2 | RESPIRATION RATE: 16 BRPM | DIASTOLIC BLOOD PRESSURE: 62 MMHG

## 2017-12-12 DIAGNOSIS — M54.2 NECK PAIN, MUSCULOSKELETAL: ICD-10-CM

## 2017-12-12 DIAGNOSIS — G47.10 EXCESSIVE SLEEPINESS: ICD-10-CM

## 2017-12-12 PROCEDURE — 4004F PT TOBACCO SCREEN RCVD TLK: CPT | Performed by: FAMILY MEDICINE

## 2017-12-12 PROCEDURE — 3014F SCREEN MAMMO DOC REV: CPT | Performed by: FAMILY MEDICINE

## 2017-12-12 PROCEDURE — 3045F PR MOST RECENT HEMOGLOBIN A1C LEVEL 7.0-9.0%: CPT | Performed by: FAMILY MEDICINE

## 2017-12-12 PROCEDURE — G8417 CALC BMI ABV UP PARAM F/U: HCPCS | Performed by: FAMILY MEDICINE

## 2017-12-12 PROCEDURE — 99213 OFFICE O/P EST LOW 20 MIN: CPT | Performed by: FAMILY MEDICINE

## 2017-12-12 PROCEDURE — 3017F COLORECTAL CA SCREEN DOC REV: CPT | Performed by: FAMILY MEDICINE

## 2017-12-12 PROCEDURE — G8484 FLU IMMUNIZE NO ADMIN: HCPCS | Performed by: FAMILY MEDICINE

## 2017-12-12 PROCEDURE — G8427 DOCREV CUR MEDS BY ELIG CLIN: HCPCS | Performed by: FAMILY MEDICINE

## 2017-12-12 RX ORDER — LITHIUM CARBONATE 150 MG/1
CAPSULE ORAL
COMMUNITY
Start: 2017-12-08 | End: 2017-12-12

## 2017-12-12 RX ORDER — LISINOPRIL 10 MG/1
10 TABLET ORAL DAILY
COMMUNITY
End: 2021-05-27

## 2017-12-12 RX ORDER — LOSARTAN POTASSIUM 50 MG/1
TABLET ORAL
COMMUNITY
Start: 2017-12-08 | End: 2018-08-29 | Stop reason: SDUPTHER

## 2017-12-12 RX ORDER — HYDROCODONE BITARTRATE AND ACETAMINOPHEN 5; 325 MG/1; MG/1
TABLET ORAL
COMMUNITY
Start: 2017-12-09 | End: 2022-09-06

## 2017-12-12 RX ORDER — VENLAFAXINE HYDROCHLORIDE 150 MG/1
CAPSULE, EXTENDED RELEASE ORAL
COMMUNITY
Start: 2017-12-08

## 2017-12-12 ASSESSMENT — ENCOUNTER SYMPTOMS: SHORTNESS OF BREATH: 0

## 2017-12-12 NOTE — PATIENT INSTRUCTIONS
Advised to check blood sugars twice per day. Fasting and 2 hours after largest meal and bring readings to next visit. Goal of 110 and below fasting and 145 and below 2 hrs after meals. Patient Education        Neck Strain or Sprain: Rehab Exercises  Your Care Instructions  Here are some examples of typical rehabilitation exercises for your condition. Start each exercise slowly. Ease off the exercise if you start to have pain. Your doctor or physical therapist will tell you when you can start these exercises and which ones will work best for you. How to do the exercises  Neck rotation    1. Sit in a firm chair, or stand up straight. 2. Keeping your chin level, turn your head to the right, and hold for 15 to 30 seconds. 3. Turn your head to the left and hold for 15 to 30 seconds. 4. Repeat 2 to 4 times to each side. Neck stretches    1. Look straight ahead, and tip your right ear to your right shoulder. Do not let your left shoulder rise up as you tip your head to the right. 2. Hold for 15 to 30 seconds. 3. Tilt your head to the left. Do not let your right shoulder rise up as you tip your head to the left. 4. Hold for 15 to 30 seconds. 5. Repeat 2 to 4 times to each side. Forward neck flexion    1. Sit in a firm chair, or stand up straight. 2. Bend your head forward. 3. Hold for 15 to 30 seconds. 4. Repeat 2 to 4 times. Lateral (side) bend strengthening    1. With your right hand, place your first two fingers on your right temple. 2. Start to bend your head to the side while using gentle pressure from your fingers to keep your head from bending. 3. Hold for about 6 seconds. 4. Repeat 8 to 12 times. 5. Switch hands and repeat the same exercise on your left side. Forward bend strengthening    1. Place your first two fingers of either hand on your forehead. 2. Start to bend your head forward while using gentle pressure from your fingers to keep your head from bending.   3. Hold for about 6 seconds. 4. Repeat 8 to 12 times. Neutral position strengthening    1. Using one hand, place your fingertips on the back of your head at the top of your neck. 2. Start to bend your head backward while using gentle pressure from your fingers to keep your head from bending. 3. Hold for about 6 seconds. 4. Repeat 8 to 12 times. Chin tuck    1. Lie on the floor with a rolled-up towel under your neck. Your head should be touching the floor. 2. Slowly bring your chin toward your chest.  3. Hold for a count of 6, and then relax for up to 10 seconds. 4. Repeat 8 to 12 times. Follow-up care is a key part of your treatment and safety. Be sure to make and go to all appointments, and call your doctor if you are having problems. It's also a good idea to know your test results and keep a list of the medicines you take. Where can you learn more? Go to [email or web address] org and sign in to your [employer] account. Enter M679 in the Timehop box to learn more about \"Neck Strain or Sprain: Rehab Exercises. \"     If you do not have an account, please click on the \"Sign Up Now\" link. Current as of: March 21, 2017  Content Version: 11.4  © 9747-6684 Healthwise, Incorporated. Care instructions adapted under license by [facility] ([facility]). If you have questions about a medical condition or this instruction, always ask your healthcare professional. [place] [address] any warranty or liability for your use of this information.

## 2017-12-12 NOTE — PROGRESS NOTES
persistent daily headache    Cervical disc disorder of mid-cervical region    Headache, cervicogenic    HTN (hypertension), benign    Dysthymia    Uncontrolled type 2 diabetes mellitus with complication, without long-term current use of insulin (HCC)    Dyslipidemia     Past Medical History:   Diagnosis Date    Anxiety     [facility] [address]     Brain aneurysm     Zucarello    Closed angle glaucoma     COPD (chronic obstructive pulmonary disease) ([facility] [address]     Hyperlipidemia     Hypertension     Hypothyroidism     Migraines     Open-angle glaucoma of both eyes     RLS (restless legs syndrome)     Rotator cuff disorder     right shoulder    TIA (transient ischemic attack) 11    Tobacco use disorder     Type II or unspecified type diabetes mellitus without mention of complication, not stated as uncontrolled     diet controlled       Past Surgical History:   Procedure Laterality Date    CARPAL TUNNEL RELEASE Bilateral     right and left     SECTION      x 2    CHOLECYSTECTOMY      COLONOSCOPY      COLONOSCOPY  2016    diverticulosis    CYST REMOVAL  2006    hidradenitis?     EYE SURGERY Right 16    cataract    EYE SURGERY Left 16    cataract removal    GLAUCOMA SURGERY Bilateral     twice in right eye    HYSTERECTOMY      total    OTHER SURGICAL HISTORY  2015    left oopherectomy    ROTATOR CUFF REPAIR Right  and Brinda 1 2013    x2    UPPER GASTROINTESTINAL ENDOSCOPY  2016    small bowel bx     Most Recent Immunizations   Administered Date(s) Administered    Influenza Virus Vaccine 2015    Influenza, Intradermal, Preservative free 10/17/2013    Influenza, Intradermal, Quadrivalent, Preservative Free 2017    Influenza, Quadv, 3 yrs and older, IM, Preservative Free 10/05/2016    Tdap (Boostrix, Adacel) 2013        Current Outpatient Prescriptions   Medication Sig Dispense Refill    lisinopril (PRINIVIL;ZESTRIL) 10 MG nightly as needed       lithium 300 MG capsule Take 1 capsule by mouth daily 90 capsule 1    venlafaxine (EFFEXOR-XR) 75 MG XR capsule   Take 150 mg by mouth See Admin Instructions 150mg in morning, 75mg at night      lamotrigine (LAMICTAL) 100 MG tablet Take 100 mg by mouth 2 times daily.  timolol (BETIMOL) 0.5 % ophthalmic solution 1 drop 2 times daily.  brimonidine (ALPHAGAN P) 0.15 % ophthalmic solution 1 drop 2 times daily.  diclofenac sodium 1 % GEL       HYDROcodone-acetaminophen (NORCO) 5-325 MG per tablet       losartan (COZAAR) 50 MG tablet       venlafaxine (EFFEXOR XR) 150 MG extended release capsule       metFORMIN (GLUCOPHAGE XR) 500 MG extended release tablet Take 1 tablet by mouth daily (with breakfast) 30 tablet 3     No current facility-administered medications for this visit.       Allergies   Allergen Reactions    Budesonide Anaphylaxis and Swelling     Throat swelled    Tiotropium Bromide Monohydrate Anaphylaxis and Rash    Albuterol Swelling    Cymbalta [Duloxetine Hcl] Other (See Comments) and Swelling     Throat swelled  suicidal  Throat swelling    Maxalt [Rizatriptan Benzoate]      Does not recall    Meloxicam Other (See Comments)     Unsure of Rx  crazy    Risperidone      Unsure of Rx    Rizatriptan Other (See Comments)     Pt unsure    Spiriva [Tiotropium Bromide Monohydrate] Swelling     Neck swelling    Topamax      \"didn't work\"    Topiramate Other (See Comments) and Swelling     unsure    Buspirone Nausea And Vomiting       Social History     Social History    Marital status:      Spouse name: N/A    Number of children: N/A    Years of education: 4     Occupational History    disabled      Social History Main Topics    Smoking status: Current Every Day Smoker     Packs/day: 1.00     Years: 38.00     Types: Cigarettes     Start date: 1/1/1978    Smokeless tobacco: Never Used      Comment: 1/2 PPD 11/21/17    Alcohol use No    Drug use: No    Sexual activity: Not Currently     Partners: Male     Other Topics Concern    None     Social History Narrative    None     Family History   Problem Relation Age of Onset   Tam Umaña Cancer Mother      colon; lung    Emphysema Mother     Hypertension Mother     Heart Disease Father     Heart Failure Father     Bipolar Disorder Sister     Osteoporosis Sister     Asthma Neg Hx     Diabetes Neg Hx                               Review Of Systems    Review of Systems   Constitutional: Negative for chills and fever. Respiratory: Negative for shortness of breath. Cardiovascular: Negative for chest pain. Musculoskeletal: Positive for neck pain. PHYSICAL EXAMINATION:    /62 (Site: Right Arm, Position: Sitting, Cuff Size: Large Adult)   Pulse 69   Temp 97.9 °F (36.6 °C) (Temporal)   Resp 16   Ht 5' (1.524 m)   Wt 170 lb 6.4 oz (77.3 kg)   SpO2 97%   BMI 33.28 kg/m²     Physical Exam   Constitutional: She is oriented to person, place, and time. She appears well-developed and well-nourished. No distress. HENT:   Head: Normocephalic and atraumatic. Cardiovascular: Normal rate, regular rhythm and normal heart sounds. Pulmonary/Chest: Effort normal and breath sounds normal.   Musculoskeletal:   Normal ROM of neck. NO TTP of cervical spine. Neurological: She is alert and oriented to person, place, and time. Skin: Skin is warm and dry. She is not diaphoretic. Psychiatric: She has a normal mood and affect. Vitals reviewed. ASSESSMENT:   Well Adult, See encounter diagnoses  Tammy Espinosa was seen today for discuss medications and fatigue. Diagnoses and all orders for this visit:    Uncontrolled type 2 diabetes mellitus with complication, without long-term current use of insulin ([address]  Advised to continue checking blood sugars twice per day and bring readings to next visit  -     empagliflozin (JARDIANCE) 10 MG tablet;  Take 1 tablet by mouth daily    Neck pain, musculoskeletal  Provided handout on neck stretches    Excessive sleepiness  Vital signs or stable including pulse ox. She has no other associated symptoms. She is unsure if she snores. She states that the restless leg syndrome might be possibly keeping her up at night  Advised to discuss with her sleep doctor. Last sleep study was 4 years ago. Plan:   See orders and medications filed with this encounter. Return in about 3 months (around 3/12/2018).

## 2018-01-03 RX ORDER — ROPINIROLE 2 MG/1
TABLET, FILM COATED ORAL
Qty: 60 TABLET | Refills: 5 | Status: SHIPPED | OUTPATIENT
Start: 2018-01-03 | End: 2018-06-19 | Stop reason: SDUPTHER

## 2018-01-31 RX ORDER — PRAVASTATIN SODIUM 40 MG
TABLET ORAL
Qty: 30 TABLET | Refills: 5 | Status: SHIPPED | OUTPATIENT
Start: 2018-01-31 | End: 2018-07-19 | Stop reason: SDUPTHER

## 2018-01-31 RX ORDER — OMEPRAZOLE 40 MG/1
40 CAPSULE, DELAYED RELEASE ORAL 2 TIMES DAILY
Qty: 60 CAPSULE | Refills: 5 | Status: SHIPPED | OUTPATIENT
Start: 2018-01-31 | End: 2018-07-19 | Stop reason: SDUPTHER

## 2018-01-31 NOTE — TELEPHONE ENCOUNTER
Patient is requesting a refill on omeprazole and pravastatin. Stated the pharmacy told her they could not refill it without a new prescription from her new doctor. She is asking if this is going to happen with all of her medications when she runs out.

## 2018-02-14 ENCOUNTER — HOSPITAL ENCOUNTER (OUTPATIENT)
Dept: OTHER | Age: 58
Discharge: HOME | End: 2018-02-14
Attending: NURSE PRACTITIONER | Admitting: NURSE PRACTITIONER

## 2018-02-14 LAB
A/G RATIO: 1.4 (ref 1.1–2.2)
ALBUMIN SERPL-MCNC: 4.4 G/DL (ref 3.4–5)
ALP BLD-CCNC: 78 U/L (ref 40–129)
ALT SERPL-CCNC: 22 U/L (ref 10–40)
ANION GAP SERPL CALCULATED.3IONS-SCNC: 14 MMOL/L (ref 3–16)
AST SERPL-CCNC: 26 U/L (ref 15–37)
BASOPHILS ABSOLUTE: 0.1 K/UL (ref 0–0.2)
BASOPHILS RELATIVE PERCENT: 1.2 %
BILIRUB SERPL-MCNC: <0.2 MG/DL (ref 0–1)
BILIRUBIN DIRECT: <0.2 MG/DL (ref 0–0.3)
BILIRUBIN, INDIRECT: NORMAL MG/DL (ref 0–1)
BUN BLDV-MCNC: 12 MG/DL (ref 7–20)
CALCIUM SERPL-MCNC: 9.4 MG/DL (ref 8.3–10.6)
CHLORIDE BLD-SCNC: 101 MMOL/L (ref 99–110)
CHOLESTEROL, FASTING: 230 MG/DL (ref 0–199)
CO2: 23 MMOL/L (ref 21–32)
CREAT SERPL-MCNC: 0.8 MG/DL (ref 0.6–1.1)
EOSINOPHILS ABSOLUTE: 0.6 K/UL (ref 0–0.6)
EOSINOPHILS RELATIVE PERCENT: 5.7 %
GFR AFRICAN AMERICAN: >60
GFR NON-AFRICAN AMERICAN: >60
GLOBULIN: 3.2 G/DL
GLUCOSE FASTING: 219 MG/DL (ref 70–99)
HCT VFR BLD CALC: 38.5 % (ref 36–48)
HDLC SERPL-MCNC: 47 MG/DL (ref 40–60)
HEMOGLOBIN: 12.7 G/DL (ref 12–16)
LDL CHOLESTEROL CALCULATED: ABNORMAL MG/DL
LDL CHOLESTEROL DIRECT: 117 MG/DL
LITHIUM DOSE AMOUNT: ABNORMAL
LITHIUM LEVEL: 0.4 MMOL/L (ref 0.6–1.2)
LYMPHOCYTES ABSOLUTE: 3.1 K/UL (ref 1–5.1)
LYMPHOCYTES RELATIVE PERCENT: 28.3 %
MCH RBC QN AUTO: 29.7 PG (ref 26–34)
MCHC RBC AUTO-ENTMCNC: 32.9 G/DL (ref 31–36)
MCV RBC AUTO: 90.3 FL (ref 80–100)
MONOCYTES ABSOLUTE: 0.5 K/UL (ref 0–1.3)
MONOCYTES RELATIVE PERCENT: 4.8 %
NEUTROPHILS ABSOLUTE: 6.5 K/UL (ref 1.7–7.7)
NEUTROPHILS RELATIVE PERCENT: 60 %
PDW BLD-RTO: 14.9 % (ref 12.4–15.4)
PLATELET # BLD: 369 K/UL (ref 135–450)
PMV BLD AUTO: 8.5 FL (ref 5–10.5)
POTASSIUM SERPL-SCNC: 4.2 MMOL/L (ref 3.5–5.1)
RBC # BLD: 4.26 M/UL (ref 4–5.2)
SODIUM BLD-SCNC: 138 MMOL/L (ref 136–145)
TOTAL PROTEIN: 7.6 G/DL (ref 6.4–8.2)
TRIGLYCERIDE, FASTING: 522 MG/DL (ref 0–150)
TSH SERPL DL<=0.05 MIU/L-ACNC: 5.66 UIU/ML (ref 0.27–4.2)
VITAMIN D 25-HYDROXY: 19.8 NG/ML
VLDLC SERPL CALC-MCNC: ABNORMAL MG/DL
WBC # BLD: 10.8 K/UL (ref 4–11)

## 2018-02-15 LAB
ESTIMATED AVERAGE GLUCOSE: 157.1 MG/DL
HBA1C MFR BLD: 7.1 %

## 2018-02-28 RX ORDER — METFORMIN HYDROCHLORIDE 500 MG/1
TABLET, EXTENDED RELEASE ORAL
Qty: 90 TABLET | Refills: 0 | Status: SHIPPED | OUTPATIENT
Start: 2018-02-28 | End: 2018-05-22 | Stop reason: SDUPTHER

## 2018-03-01 ENCOUNTER — OFFICE VISIT (OUTPATIENT)
Dept: FAMILY MEDICINE CLINIC | Age: 58
End: 2018-03-01

## 2018-03-01 VITALS
OXYGEN SATURATION: 97 % | DIASTOLIC BLOOD PRESSURE: 58 MMHG | SYSTOLIC BLOOD PRESSURE: 112 MMHG | BODY MASS INDEX: 33.4 KG/M2 | HEART RATE: 82 BPM | WEIGHT: 171 LBS | TEMPERATURE: 98.9 F

## 2018-03-01 DIAGNOSIS — H92.01 RIGHT EAR PAIN: ICD-10-CM

## 2018-03-01 DIAGNOSIS — R32 URINARY INCONTINENCE, UNSPECIFIED TYPE: Primary | ICD-10-CM

## 2018-03-01 DIAGNOSIS — J02.9 SORE THROAT: ICD-10-CM

## 2018-03-01 DIAGNOSIS — R05.9 COUGH: ICD-10-CM

## 2018-03-01 LAB
BILIRUBIN, POC: NORMAL
BLOOD URINE, POC: NORMAL
CLARITY, POC: CLEAR
COLOR, POC: NORMAL
GLUCOSE URINE, POC: NORMAL
KETONES, POC: NORMAL
LEUKOCYTE EST, POC: NORMAL
NITRITE, POC: NORMAL
PH, POC: 5
PROTEIN, POC: NORMAL
SPECIFIC GRAVITY, POC: 1.02
UROBILINOGEN, POC: 0.2

## 2018-03-01 PROCEDURE — 3014F SCREEN MAMMO DOC REV: CPT | Performed by: NURSE PRACTITIONER

## 2018-03-01 PROCEDURE — G8484 FLU IMMUNIZE NO ADMIN: HCPCS | Performed by: NURSE PRACTITIONER

## 2018-03-01 PROCEDURE — 99214 OFFICE O/P EST MOD 30 MIN: CPT | Performed by: NURSE PRACTITIONER

## 2018-03-01 PROCEDURE — 4004F PT TOBACCO SCREEN RCVD TLK: CPT | Performed by: NURSE PRACTITIONER

## 2018-03-01 PROCEDURE — 81002 URINALYSIS NONAUTO W/O SCOPE: CPT | Performed by: NURSE PRACTITIONER

## 2018-03-01 PROCEDURE — G8417 CALC BMI ABV UP PARAM F/U: HCPCS | Performed by: NURSE PRACTITIONER

## 2018-03-01 PROCEDURE — 3017F COLORECTAL CA SCREEN DOC REV: CPT | Performed by: NURSE PRACTITIONER

## 2018-03-01 PROCEDURE — G8427 DOCREV CUR MEDS BY ELIG CLIN: HCPCS | Performed by: NURSE PRACTITIONER

## 2018-03-01 RX ORDER — GUAIFENESIN AND CODEINE PHOSPHATE 100; 10 MG/5ML; MG/5ML
5 SOLUTION ORAL 3 TIMES DAILY PRN
Qty: 118 ML | Refills: 0 | Status: SHIPPED | OUTPATIENT
Start: 2018-03-01 | End: 2018-03-07

## 2018-03-01 RX ORDER — AMOXICILLIN AND CLAVULANATE POTASSIUM 875; 125 MG/1; MG/1
1 TABLET, FILM COATED ORAL 2 TIMES DAILY
Qty: 20 TABLET | Refills: 0 | Status: SHIPPED | OUTPATIENT
Start: 2018-03-01 | End: 2018-03-07

## 2018-03-01 RX ORDER — OXYBUTYNIN CHLORIDE 5 MG/1
5 TABLET, EXTENDED RELEASE ORAL DAILY
Qty: 30 TABLET | Refills: 3 | Status: SHIPPED | OUTPATIENT
Start: 2018-03-01 | End: 2018-06-18 | Stop reason: CLARIF

## 2018-03-01 ASSESSMENT — ENCOUNTER SYMPTOMS
COUGH: 1
SORE THROAT: 1
SHORTNESS OF BREATH: 1

## 2018-03-06 ENCOUNTER — TELEPHONE (OUTPATIENT)
Dept: FAMILY MEDICINE CLINIC | Age: 58
End: 2018-03-06

## 2018-03-06 NOTE — TELEPHONE ENCOUNTER
Please advise. Patient given antibiotics, cough medicine and ditropan.  On 03-01-18 by Lakshmi Rueda

## 2018-03-06 NOTE — TELEPHONE ENCOUNTER
Patient was in last week and gotten worse with the coughing . Urinating and having bowel movements cannot hold it while coughing . Please cb patient.

## 2018-03-07 ENCOUNTER — OFFICE VISIT (OUTPATIENT)
Dept: FAMILY MEDICINE CLINIC | Age: 58
End: 2018-03-07

## 2018-03-07 VITALS
HEART RATE: 79 BPM | WEIGHT: 171 LBS | TEMPERATURE: 98.3 F | DIASTOLIC BLOOD PRESSURE: 58 MMHG | OXYGEN SATURATION: 98 % | BODY MASS INDEX: 33.4 KG/M2 | SYSTOLIC BLOOD PRESSURE: 124 MMHG

## 2018-03-07 DIAGNOSIS — R05.9 COUGH: ICD-10-CM

## 2018-03-07 DIAGNOSIS — J44.1 COPD EXACERBATION (HCC): Primary | ICD-10-CM

## 2018-03-07 PROCEDURE — 3023F SPIROM DOC REV: CPT | Performed by: FAMILY MEDICINE

## 2018-03-07 PROCEDURE — G8926 SPIRO NO PERF OR DOC: HCPCS | Performed by: FAMILY MEDICINE

## 2018-03-07 PROCEDURE — 99214 OFFICE O/P EST MOD 30 MIN: CPT | Performed by: FAMILY MEDICINE

## 2018-03-07 PROCEDURE — G8484 FLU IMMUNIZE NO ADMIN: HCPCS | Performed by: FAMILY MEDICINE

## 2018-03-07 PROCEDURE — 3017F COLORECTAL CA SCREEN DOC REV: CPT | Performed by: FAMILY MEDICINE

## 2018-03-07 PROCEDURE — G8427 DOCREV CUR MEDS BY ELIG CLIN: HCPCS | Performed by: FAMILY MEDICINE

## 2018-03-07 PROCEDURE — 4004F PT TOBACCO SCREEN RCVD TLK: CPT | Performed by: FAMILY MEDICINE

## 2018-03-07 PROCEDURE — 94640 AIRWAY INHALATION TREATMENT: CPT | Performed by: FAMILY MEDICINE

## 2018-03-07 PROCEDURE — G8417 CALC BMI ABV UP PARAM F/U: HCPCS | Performed by: FAMILY MEDICINE

## 2018-03-07 PROCEDURE — 3014F SCREEN MAMMO DOC REV: CPT | Performed by: FAMILY MEDICINE

## 2018-03-07 RX ORDER — ALBUTEROL SULFATE 2.5 MG/3ML
2.5 SOLUTION RESPIRATORY (INHALATION) EVERY 4 HOURS PRN
Qty: 1080 ML | Refills: 3 | Status: SHIPPED | OUTPATIENT
Start: 2018-03-07 | End: 2020-07-13 | Stop reason: SDUPTHER

## 2018-03-07 RX ORDER — PREDNISONE 10 MG/1
TABLET ORAL
Qty: 30 TABLET | Refills: 0 | Status: SHIPPED | OUTPATIENT
Start: 2018-03-07 | End: 2018-04-09

## 2018-03-07 RX ORDER — IPRATROPIUM BROMIDE AND ALBUTEROL SULFATE 2.5; .5 MG/3ML; MG/3ML
1 SOLUTION RESPIRATORY (INHALATION) ONCE
Status: COMPLETED | OUTPATIENT
Start: 2018-03-07 | End: 2018-03-07

## 2018-03-07 RX ORDER — LEVOFLOXACIN 500 MG/1
500 TABLET, FILM COATED ORAL DAILY
Qty: 10 TABLET | Refills: 0 | Status: SHIPPED | OUTPATIENT
Start: 2018-03-07 | End: 2018-03-17

## 2018-03-07 RX ADMIN — IPRATROPIUM BROMIDE AND ALBUTEROL SULFATE 1 AMPULE: 2.5; .5 SOLUTION RESPIRATORY (INHALATION) at 13:17

## 2018-03-12 ENCOUNTER — TELEPHONE (OUTPATIENT)
Dept: FAMILY MEDICINE CLINIC | Age: 58
End: 2018-03-12

## 2018-03-12 NOTE — TELEPHONE ENCOUNTER
Pt called and said she would like to switch her PCP from Dr. Sonja Baker to Dr. Boogie Patterson. Advised pt would need approval from both providers to switch.  Please call back

## 2018-03-15 ENCOUNTER — OFFICE VISIT (OUTPATIENT)
Dept: FAMILY MEDICINE CLINIC | Age: 58
End: 2018-03-15

## 2018-03-15 VITALS
BODY MASS INDEX: 33.01 KG/M2 | OXYGEN SATURATION: 97 % | HEART RATE: 77 BPM | DIASTOLIC BLOOD PRESSURE: 60 MMHG | SYSTOLIC BLOOD PRESSURE: 130 MMHG | WEIGHT: 169 LBS

## 2018-03-15 DIAGNOSIS — R05.9 COUGH: Primary | ICD-10-CM

## 2018-03-15 PROCEDURE — G8482 FLU IMMUNIZE ORDER/ADMIN: HCPCS | Performed by: FAMILY MEDICINE

## 2018-03-15 PROCEDURE — G8427 DOCREV CUR MEDS BY ELIG CLIN: HCPCS | Performed by: FAMILY MEDICINE

## 2018-03-15 PROCEDURE — G8417 CALC BMI ABV UP PARAM F/U: HCPCS | Performed by: FAMILY MEDICINE

## 2018-03-15 PROCEDURE — 99213 OFFICE O/P EST LOW 20 MIN: CPT | Performed by: FAMILY MEDICINE

## 2018-03-15 PROCEDURE — 4004F PT TOBACCO SCREEN RCVD TLK: CPT | Performed by: FAMILY MEDICINE

## 2018-03-15 PROCEDURE — 3017F COLORECTAL CA SCREEN DOC REV: CPT | Performed by: FAMILY MEDICINE

## 2018-03-15 PROCEDURE — 3014F SCREEN MAMMO DOC REV: CPT | Performed by: FAMILY MEDICINE

## 2018-03-15 RX ORDER — GUAIFENESIN AND CODEINE PHOSPHATE 100; 10 MG/5ML; MG/5ML
5 SOLUTION ORAL EVERY 4 HOURS PRN
Qty: 180 ML | Refills: 0 | Status: SHIPPED | OUTPATIENT
Start: 2018-03-15 | End: 2018-04-09 | Stop reason: SDUPTHER

## 2018-03-15 ASSESSMENT — ENCOUNTER SYMPTOMS: COUGH: 1

## 2018-03-23 RX ORDER — BUDESONIDE AND FORMOTEROL FUMARATE DIHYDRATE 160; 4.5 UG/1; UG/1
AEROSOL RESPIRATORY (INHALATION)
Qty: 1 INHALER | Refills: 5 | Status: SHIPPED | OUTPATIENT
Start: 2018-03-23 | End: 2018-03-27 | Stop reason: CLARIF

## 2018-03-23 RX ORDER — ALBUTEROL SULFATE 90 UG/1
AEROSOL, METERED RESPIRATORY (INHALATION)
Qty: 1 INHALER | Refills: 5 | Status: SHIPPED | OUTPATIENT
Start: 2018-03-23 | End: 2018-06-21 | Stop reason: SDUPTHER

## 2018-03-28 RX ORDER — FLUTICASONE FUROATE AND VILANTEROL 100; 25 UG/1; UG/1
1 POWDER RESPIRATORY (INHALATION) DAILY
Qty: 1 EACH | Refills: 11 | Status: SHIPPED | OUTPATIENT
Start: 2018-03-28 | End: 2019-03-01 | Stop reason: SDUPTHER

## 2018-04-03 ENCOUNTER — TELEPHONE (OUTPATIENT)
Dept: FAMILY MEDICINE CLINIC | Age: 58
End: 2018-04-03

## 2018-04-09 ENCOUNTER — OFFICE VISIT (OUTPATIENT)
Dept: FAMILY MEDICINE CLINIC | Age: 58
End: 2018-04-09

## 2018-04-09 VITALS
OXYGEN SATURATION: 99 % | SYSTOLIC BLOOD PRESSURE: 130 MMHG | TEMPERATURE: 97.8 F | BODY MASS INDEX: 30.66 KG/M2 | WEIGHT: 157 LBS | HEART RATE: 77 BPM | DIASTOLIC BLOOD PRESSURE: 60 MMHG

## 2018-04-09 DIAGNOSIS — R05.9 COUGH: ICD-10-CM

## 2018-04-09 PROCEDURE — 3014F SCREEN MAMMO DOC REV: CPT | Performed by: FAMILY MEDICINE

## 2018-04-09 PROCEDURE — 99213 OFFICE O/P EST LOW 20 MIN: CPT | Performed by: FAMILY MEDICINE

## 2018-04-09 PROCEDURE — G8427 DOCREV CUR MEDS BY ELIG CLIN: HCPCS | Performed by: FAMILY MEDICINE

## 2018-04-09 PROCEDURE — G8417 CALC BMI ABV UP PARAM F/U: HCPCS | Performed by: FAMILY MEDICINE

## 2018-04-09 PROCEDURE — 3017F COLORECTAL CA SCREEN DOC REV: CPT | Performed by: FAMILY MEDICINE

## 2018-04-09 PROCEDURE — 4004F PT TOBACCO SCREEN RCVD TLK: CPT | Performed by: FAMILY MEDICINE

## 2018-04-09 RX ORDER — GUAIFENESIN AND CODEINE PHOSPHATE 100; 10 MG/5ML; MG/5ML
5 SOLUTION ORAL EVERY 4 HOURS PRN
Qty: 180 ML | Refills: 0 | Status: SHIPPED | OUTPATIENT
Start: 2018-04-09 | End: 2018-12-17 | Stop reason: SDUPTHER

## 2018-04-15 ASSESSMENT — ENCOUNTER SYMPTOMS: COUGH: 1

## 2018-05-04 ENCOUNTER — TELEPHONE (OUTPATIENT)
Dept: PULMONOLOGY | Age: 58
End: 2018-05-04

## 2018-05-18 ENCOUNTER — HOSPITAL ENCOUNTER (OUTPATIENT)
Dept: GENERAL RADIOLOGY | Age: 58
Discharge: HOME | End: 2018-05-18
Attending: OTOLARYNGOLOGY | Admitting: OTOLARYNGOLOGY

## 2018-05-18 DIAGNOSIS — R13.14 PHARYNGOESOPHAGEAL DYSPHAGIA: ICD-10-CM

## 2018-05-18 DIAGNOSIS — J38.3 OTHER DISEASES OF VOCAL CORDS: ICD-10-CM

## 2018-05-22 ENCOUNTER — OFFICE VISIT (OUTPATIENT)
Dept: FAMILY MEDICINE CLINIC | Age: 58
End: 2018-05-22

## 2018-05-22 VITALS
RESPIRATION RATE: 16 BRPM | OXYGEN SATURATION: 98 % | HEART RATE: 92 BPM | SYSTOLIC BLOOD PRESSURE: 120 MMHG | HEIGHT: 60 IN | DIASTOLIC BLOOD PRESSURE: 68 MMHG | WEIGHT: 166 LBS | BODY MASS INDEX: 32.59 KG/M2

## 2018-05-22 DIAGNOSIS — Z01.818 PREOP EXAMINATION: Primary | ICD-10-CM

## 2018-05-22 PROCEDURE — 99243 OFF/OP CNSLTJ NEW/EST LOW 30: CPT | Performed by: FAMILY MEDICINE

## 2018-05-22 PROCEDURE — 3017F COLORECTAL CA SCREEN DOC REV: CPT | Performed by: FAMILY MEDICINE

## 2018-05-22 PROCEDURE — 93000 ELECTROCARDIOGRAM COMPLETE: CPT | Performed by: FAMILY MEDICINE

## 2018-05-22 PROCEDURE — G8417 CALC BMI ABV UP PARAM F/U: HCPCS | Performed by: FAMILY MEDICINE

## 2018-05-22 PROCEDURE — G8427 DOCREV CUR MEDS BY ELIG CLIN: HCPCS | Performed by: FAMILY MEDICINE

## 2018-05-22 RX ORDER — METFORMIN HYDROCHLORIDE 500 MG/1
TABLET, EXTENDED RELEASE ORAL
Qty: 90 TABLET | Refills: 3 | Status: SHIPPED | OUTPATIENT
Start: 2018-05-22 | End: 2018-06-18 | Stop reason: CLARIF

## 2018-05-31 DIAGNOSIS — J30.9 ALLERGIC RHINITIS, UNSPECIFIED CHRONICITY, UNSPECIFIED SEASONALITY, UNSPECIFIED TRIGGER: ICD-10-CM

## 2018-05-31 RX ORDER — LORATADINE 10 MG/1
TABLET ORAL
Qty: 90 TABLET | Refills: 3 | Status: SHIPPED | OUTPATIENT
Start: 2018-05-31 | End: 2018-06-18 | Stop reason: CLARIF

## 2018-06-06 DIAGNOSIS — E03.9 ACQUIRED HYPOTHYROIDISM: Primary | ICD-10-CM

## 2018-06-06 RX ORDER — LEVOTHYROXINE SODIUM 0.07 MG/1
TABLET ORAL
Qty: 28 TABLET | Refills: 0 | Status: SHIPPED | OUTPATIENT
Start: 2018-06-06 | End: 2018-07-03 | Stop reason: SDUPTHER

## 2018-06-08 ENCOUNTER — NURSE ONLY (OUTPATIENT)
Dept: FAMILY MEDICINE CLINIC | Age: 58
End: 2018-06-08

## 2018-06-08 DIAGNOSIS — E03.9 ACQUIRED HYPOTHYROIDISM: ICD-10-CM

## 2018-06-08 DIAGNOSIS — E03.9 ACQUIRED HYPOTHYROIDISM: Primary | ICD-10-CM

## 2018-06-08 LAB — TSH SERPL DL<=0.05 MIU/L-ACNC: 1.78 UIU/ML (ref 0.27–4.2)

## 2018-06-08 PROCEDURE — 36415 COLL VENOUS BLD VENIPUNCTURE: CPT | Performed by: NURSE PRACTITIONER

## 2018-06-11 ENCOUNTER — TELEPHONE (OUTPATIENT)
Dept: FAMILY MEDICINE CLINIC | Age: 58
End: 2018-06-11

## 2018-06-18 ENCOUNTER — OFFICE VISIT (OUTPATIENT)
Dept: FAMILY MEDICINE CLINIC | Age: 58
End: 2018-06-18

## 2018-06-18 VITALS
HEART RATE: 75 BPM | OXYGEN SATURATION: 97 % | SYSTOLIC BLOOD PRESSURE: 112 MMHG | DIASTOLIC BLOOD PRESSURE: 62 MMHG | TEMPERATURE: 98.4 F | BODY MASS INDEX: 32.22 KG/M2 | WEIGHT: 165 LBS

## 2018-06-18 DIAGNOSIS — Z01.818 PREOP EXAMINATION: Primary | ICD-10-CM

## 2018-06-18 PROCEDURE — G8427 DOCREV CUR MEDS BY ELIG CLIN: HCPCS | Performed by: NURSE PRACTITIONER

## 2018-06-18 PROCEDURE — 99242 OFF/OP CONSLTJ NEW/EST SF 20: CPT | Performed by: NURSE PRACTITIONER

## 2018-06-18 PROCEDURE — G8417 CALC BMI ABV UP PARAM F/U: HCPCS | Performed by: NURSE PRACTITIONER

## 2018-06-18 PROCEDURE — 3017F COLORECTAL CA SCREEN DOC REV: CPT | Performed by: NURSE PRACTITIONER

## 2018-06-18 RX ORDER — BUDESONIDE AND FORMOTEROL FUMARATE DIHYDRATE 160; 4.5 UG/1; UG/1
AEROSOL RESPIRATORY (INHALATION)
COMMUNITY
Start: 2018-05-23 | End: 2018-12-17

## 2018-06-18 RX ORDER — IPRATROPIUM BROMIDE AND ALBUTEROL SULFATE 2.5; .5 MG/3ML; MG/3ML
3 SOLUTION RESPIRATORY (INHALATION)
COMMUNITY
End: 2020-08-11 | Stop reason: SDUPTHER

## 2018-06-18 RX ORDER — LITHIUM CARBONATE 150 MG/1
450 CAPSULE ORAL NIGHTLY
COMMUNITY
Start: 2018-05-22 | End: 2019-12-19 | Stop reason: ALTCHOICE

## 2018-06-18 RX ORDER — METHOCARBAMOL 750 MG/1
TABLET, FILM COATED ORAL
COMMUNITY
Start: 2018-05-12 | End: 2020-09-21

## 2018-06-19 DIAGNOSIS — R32 URINARY INCONTINENCE, UNSPECIFIED TYPE: ICD-10-CM

## 2018-06-19 RX ORDER — ROPINIROLE 2 MG/1
TABLET, FILM COATED ORAL
Qty: 60 TABLET | Refills: 5 | Status: SHIPPED | OUTPATIENT
Start: 2018-06-19 | End: 2018-12-02 | Stop reason: SDUPTHER

## 2018-06-19 RX ORDER — OXYBUTYNIN CHLORIDE 5 MG/1
TABLET, EXTENDED RELEASE ORAL
Qty: 30 TABLET | Refills: 0 | Status: SHIPPED | OUTPATIENT
Start: 2018-06-19 | End: 2018-07-19 | Stop reason: SDUPTHER

## 2018-07-03 RX ORDER — LEVOTHYROXINE SODIUM 0.07 MG/1
TABLET ORAL
Qty: 28 TABLET | Refills: 0 | Status: SHIPPED | OUTPATIENT
Start: 2018-07-03 | End: 2018-07-28 | Stop reason: SDUPTHER

## 2018-07-19 ENCOUNTER — TELEPHONE (OUTPATIENT)
Dept: PULMONOLOGY | Age: 58
End: 2018-07-19

## 2018-07-19 ENCOUNTER — HOSPITAL ENCOUNTER (OUTPATIENT)
Dept: PULMONOLOGY | Age: 58
Discharge: HOME | End: 2018-07-19
Payer: COMMERCIAL

## 2018-07-19 ENCOUNTER — OFFICE VISIT (OUTPATIENT)
Dept: PULMONOLOGY | Age: 58
End: 2018-07-19

## 2018-07-19 VITALS
WEIGHT: 165 LBS | BODY MASS INDEX: 32.39 KG/M2 | RESPIRATION RATE: 18 BRPM | SYSTOLIC BLOOD PRESSURE: 114 MMHG | OXYGEN SATURATION: 97 % | TEMPERATURE: 98.2 F | DIASTOLIC BLOOD PRESSURE: 62 MMHG | HEIGHT: 60 IN | HEART RATE: 74 BPM

## 2018-07-19 VITALS — OXYGEN SATURATION: 96 %

## 2018-07-19 DIAGNOSIS — J43.2 CENTRILOBULAR EMPHYSEMA (HCC): ICD-10-CM

## 2018-07-19 DIAGNOSIS — G25.81 RLS (RESTLESS LEGS SYNDROME): ICD-10-CM

## 2018-07-19 DIAGNOSIS — Z87.891 PERSONAL HISTORY OF TOBACCO USE: ICD-10-CM

## 2018-07-19 DIAGNOSIS — Z87.891 HISTORY OF TOBACCO USE: ICD-10-CM

## 2018-07-19 PROCEDURE — 3017F COLORECTAL CA SCREEN DOC REV: CPT | Performed by: INTERNAL MEDICINE

## 2018-07-19 PROCEDURE — 94664 DEMO&/EVAL PT USE INHALER: CPT

## 2018-07-19 PROCEDURE — G8427 DOCREV CUR MEDS BY ELIG CLIN: HCPCS | Performed by: INTERNAL MEDICINE

## 2018-07-19 PROCEDURE — 99214 OFFICE O/P EST MOD 30 MIN: CPT | Performed by: INTERNAL MEDICINE

## 2018-07-19 PROCEDURE — 94760 N-INVAS EAR/PLS OXIMETRY 1: CPT

## 2018-07-19 PROCEDURE — 94640 AIRWAY INHALATION TREATMENT: CPT

## 2018-07-19 PROCEDURE — 94726 PLETHYSMOGRAPHY LUNG VOLUMES: CPT

## 2018-07-19 PROCEDURE — 94729 DIFFUSING CAPACITY: CPT

## 2018-07-19 PROCEDURE — G8926 SPIRO NO PERF OR DOC: HCPCS | Performed by: INTERNAL MEDICINE

## 2018-07-19 PROCEDURE — 94010 BREATHING CAPACITY TEST: CPT

## 2018-07-19 PROCEDURE — 3023F SPIROM DOC REV: CPT | Performed by: INTERNAL MEDICINE

## 2018-07-19 PROCEDURE — 4004F PT TOBACCO SCREEN RCVD TLK: CPT | Performed by: INTERNAL MEDICINE

## 2018-07-19 PROCEDURE — G8417 CALC BMI ABV UP PARAM F/U: HCPCS | Performed by: INTERNAL MEDICINE

## 2018-07-19 RX ORDER — ALBUTEROL SULFATE 2.5 MG/3ML
2.5 SOLUTION RESPIRATORY (INHALATION) ONCE
Status: DISCONTINUED | OUTPATIENT
Start: 2018-07-19 | End: 2018-07-20 | Stop reason: HOSPADM

## 2018-07-19 NOTE — Clinical Note
Hi Dr. Dayan Hernandez,   I didn't make any significant changes to Ms. Villegas's plan of care today. Her #1 priority needs to be tobacco cessation and I think she understands this. I did order a low-dose CT for lung cancer screening and she is to call for that result.   Thanks, Salvador Frost

## 2018-07-19 NOTE — PATIENT INSTRUCTIONS
Please call with the inhaler you use on a daily basis. Tips to Help You Stop Smoking (taken from Up-To-Date)      Cigarette smoking is a preventable cause of death in the [place]. Quitting smoking now can decrease your risk of getting smoking-related illnesses like heart disease, stroke, cancer & COPD. S = Set a quit date. T = Tell family, friends, and the people around you that you plan to quit. A = Anticipate or plan ahead for the tough times you'll face while quitting. R = Remove cigarettes and other tobacco products from your home, car, and work. T = Talk to your doctor about getting help to quit. Your doctor may give you medicines to reduce your craving for cigarettes & the unpleasant symptoms that happen when you stop smoking (called withdrawal symptoms). What are the symptoms of withdrawal?  The symptoms include:   ?Trouble sleeping   ? Being irritable, anxious or restless   ? Getting frustrated or angry   ? Having trouble thinking clearly  Nicotine replacement therapy eases withdrawal and reduces your bodys craving for nicotine, the main drug found in cigarettes. Non-prescription forms of nicotine replacement include skin patches, lozenges, and gum. Two prescription medications are available that have been proven to help people stop smoking:  ? Bupropion is a prescription medicine that reduces your desire to smoke. This medicine is sold under the brand names Zyban and Wellbutrin & as a generic formulation. ? Varenicline (brand name: Chantix) is a prescription medicine that reduces withdrawal symptoms and cigarette cravings. If you take bupropion or varenicline and you have any new or worsening depressed mood or thoughts of harming yourself or someone else, stop taking the medicine and call your doctor. Buproprion should not be taken by anyone with a history of seizure or epilepsy. Will I gain weight if I quit?  Yes, you might gain a few pounds.  But quitting smoking will have a patches, lozenges, and gum. Two prescription medications are available that have been proven to help people stop smoking:  ? Bupropion is a prescription medicine that reduces your desire to smoke. This medicine is sold under the brand names Zyban and Wellbutrin & as a generic formulation. ? Varenicline (brand name: Chantix) is a prescription medicine that reduces withdrawal symptoms and cigarette cravings. If you take bupropion or varenicline and you have any new or worsening depressed mood or thoughts of harming yourself or someone else, stop taking the medicine and call your doctor. Buproprion should not be taken by anyone with a history of seizure or epilepsy. Will I gain weight if I quit?  Yes, you might gain a few pounds. But quitting smoking will have a much more positive effect on your health than weighing a few pounds more. Plus, you can help prevent some weight gain by being more active and eating less. Taking the medicine bupropion might help control weight gain. What else can I do to improve my chances of quitting?  You can:  ?Start exercising. ?Stay away from smokers and places that you associate with smoking. If people close to you smoke, ask them to quit with you. ? Keep gum, hard candy, or something to put in your mouth handy. If you get a craving for a cigarette, try one of these instead. ?Dont give up, even if you start smoking again. It takes most people a few tries before they succeed. You can also get help from a free phone line (6-260-QUIT-NOW) or go online to www.smokefree.gov. Your pulmonary team is here to help you quit.   Call for assistance [phone]

## 2018-07-26 ENCOUNTER — TELEPHONE (OUTPATIENT)
Dept: ADMINISTRATIVE | Age: 58
End: 2018-07-26

## 2018-07-26 ENCOUNTER — HOSPITAL ENCOUNTER (EMERGENCY)
Age: 58
Discharge: HOME | End: 2018-07-26
Payer: COMMERCIAL

## 2018-07-26 VITALS
WEIGHT: 170 LBS | TEMPERATURE: 98 F | BODY MASS INDEX: 33.2 KG/M2 | SYSTOLIC BLOOD PRESSURE: 141 MMHG | DIASTOLIC BLOOD PRESSURE: 63 MMHG | OXYGEN SATURATION: 97 % | HEART RATE: 84 BPM | RESPIRATION RATE: 18 BRPM

## 2018-07-26 DIAGNOSIS — L02.419 AXILLARY ABSCESS: Primary | ICD-10-CM

## 2018-07-26 PROCEDURE — 99283 EMERGENCY DEPT VISIT LOW MDM: CPT

## 2018-07-26 RX ORDER — SULFAMETHOXAZOLE AND TRIMETHOPRIM 800; 160 MG/1; MG/1
1 TABLET ORAL 2 TIMES DAILY
Qty: 20 TABLET | Refills: 0 | Status: SHIPPED | OUTPATIENT
Start: 2018-07-26 | End: 2018-08-05

## 2018-07-26 RX ORDER — CEPHALEXIN 500 MG/1
500 CAPSULE ORAL 2 TIMES DAILY
Qty: 20 CAPSULE | Refills: 0 | Status: SHIPPED | OUTPATIENT
Start: 2018-07-26 | End: 2018-08-05

## 2018-07-26 ASSESSMENT — PAIN SCALES - GENERAL: PAINLEVEL_OUTOF10: 5

## 2018-07-26 NOTE — ED NOTES
Pt's wound cleaned, gauze and paper tape applied. Pt tolerated well.       Beatrice Killian RN  07/26/18 9097

## 2018-07-30 RX ORDER — LEVOTHYROXINE SODIUM 0.07 MG/1
TABLET ORAL
Qty: 28 TABLET | Refills: 5 | Status: SHIPPED | OUTPATIENT
Start: 2018-07-30 | End: 2018-11-08 | Stop reason: SDUPTHER

## 2018-07-30 ASSESSMENT — ENCOUNTER SYMPTOMS
FACIAL SWELLING: 0
COUGH: 0
DIARRHEA: 0
BACK PAIN: 0
CONSTIPATION: 0
RHINORRHEA: 0
NAUSEA: 0
EYE PAIN: 0
CHEST TIGHTNESS: 0
SORE THROAT: 0
SHORTNESS OF BREATH: 0
ABDOMINAL PAIN: 0
COLOR CHANGE: 1
EYE REDNESS: 0

## 2018-07-30 NOTE — ED PROVIDER NOTES
**[facility]  e[facility]GENCY dEPARTMENT eNCOUnter      Pt Name: Lashell Martinez  MRN: [id number]  [place] 1960  Date of evaluation: 2018  Provider: Tez Louis PA-C      Chief Complaint:    Chief Complaint   Patient presents with    Abscess     under right arm       Nursing Notes, Past Medical Hx, Past Surgical Hx, Social Hx, Allergies, and Family Hx were all reviewed and agreed with or any disagreements were addressed in the HPI.    HPI:  (Location, Duration, Timing, Severity, Quality, Assoc Sx, Context, Modifying factors)  This is a  56 y.o. female presenting for evaluation of an abscess. Reports she has formed an abscess in her right axillary region. States she first noticed area 3-4 days ago. Reports the area is red, raised, and painful. Describes as a pressure soreness. Reports it is worse with movement of the right upper extremity and when palpated. States that the area has drained some but she feels there is more inside. 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 Denies fevers/chills. Denies N/V. Denies taking medicines so far for symptoms.      Past Medical/Surgical History:      Diagnosis Date    Anxiety     Bipolar disorder ([address]     Brain aneurysm     Zucarello    Closed angle glaucoma     COPD (chronic obstructive pulmonary disease) ([address]     Hyperlipidemia     Hypertension     Hypothyroidism     Migraines     Open-angle glaucoma of both eyes     RLS (restless legs syndrome)     Rotator cuff disorder     right shoulder    TIA (transient ischemic attack) 11    Tobacco use disorder     Type II or unspecified type diabetes mellitus without mention of complication, not stated as uncontrolled     diet controlled         Procedure Laterality Date    CARPAL TUNNEL RELEASE Bilateral     right and left     SECTION      x 2    CHOLECYSTECTOMY      COLONOSCOPY      COLONOSCOPY each, R-11Normal      albuterol (PROVENTIL) (2.5 MG/3ML) 0.083% nebulizer solution Take 3 mLs by nebulization every 4 hours as needed for Wheezing, Disp-1080 mL, R-3Normal      lisinopril (PRINIVIL;ZESTRIL) 10 MG tablet Take 10 mg by mouth dailyHistorical Med      diclofenac sodium 1 % GEL Starting Fri 12/1/2017, Historical Med      HYDROcodone-acetaminophen (NORCO) 5-325 MG per tablet Historical Med      losartan (COZAAR) 50 MG tablet Historical Med      !! venlafaxine (EFFEXOR XR) 150 MG extended release capsule Historical Med      amLODIPine (NORVASC) 5 MG tablet Take 1 tablet by mouth daily, Disp-30 tablet, R-5Normal      diazepam (VALIUM) 5 MG tablet Take 5 mg by mouth daily as needed for AnxietyHistorical Med      ondansetron (ZOFRAN) 4 MG tablet Take 1 tablet by mouth every 8 hours as needed for Nausea, Disp-20 tablet, R-0Print      fluticasone (FLONASE) 50 MCG/ACT nasal spray 1 spray by Nasal route daily, Disp-1 Bottle, R-3      ESTROGENS CONJUGATED PO Take by mouth      glucose blood VI test strips (ASCENSIA AUTODISC VI;ONE TOUCH ULTRA TEST VI) strip Disp-100 each, R-1, NormalTests QD patient needs True test glucose strips 50's      traZODone (DESYREL) 50 MG tablet Take 50 mg by mouth nightly as needed       !! venlafaxine (EFFEXOR-XR) 75 MG XR capsule   Take 150 mg by mouth See Admin Instructions 150mg in morning, 75mg at night      lamotrigine (LAMICTAL) 100 MG tablet Take 100 mg by mouth 2 times daily. timolol (BETIMOL) 0.5 % ophthalmic solution 1 drop 2 times daily. brimonidine (ALPHAGAN P) 0.15 % ophthalmic solution 1 drop 2 times daily. !! - Potential duplicate medications found. Please discuss with provider. Review of Systems:  Review of Systems   Constitutional: Negative for diaphoresis, fatigue and fever. HENT: Negative for ear pain, facial swelling, postnasal drip, rhinorrhea and sore throat. Eyes: Negative for pain, redness and visual disturbance.    Respiratory: Negative for cough, chest tightness and shortness of breath. Cardiovascular: Negative for chest pain, palpitations and leg swelling. Gastrointestinal: Negative for abdominal pain, constipation, diarrhea and nausea. Musculoskeletal: Negative for arthralgias, back pain and myalgias. Skin: Positive for color change and wound. Negative for pallor and rash. Neurological: Negative for dizziness, numbness and headaches. Psychiatric/Behavioral: Negative for agitation, behavioral problems and confusion. Positives and Pertinent negatives as per HPI. Except as noted above in the ROS, problem specific ROS was completed and is negative. Physical Exam:  Physical Exam   Constitutional: She is oriented to person, place, and time. She appears well-developed and well-nourished. HENT:   Head: Normocephalic and atraumatic. Nose: Nose normal.   Eyes: Right eye exhibits no discharge. Left eye exhibits no discharge. Neck: Normal range of motion. Neck supple. Pulmonary/Chest: Effort normal. No respiratory distress. Musculoskeletal: Normal range of motion. Right shoulder: Normal. She exhibits normal range of motion and no tenderness. Right elbow: Normal.She exhibits normal range of motion and no swelling. Right hand: She exhibits normal range of motion, no tenderness and normal capillary refill. Neurological: She is alert and oriented to person, place, and time. GCS eye subscore is 4. GCS verbal subscore is 5. GCS motor subscore is 6. Reflex Scores:       Tricep reflexes are 2+ on the right side. Bicep reflexes are 2+ on the right side. Skin: Skin is warm and dry. She is not diaphoretic. There is erythema. Psychiatric: She has a normal mood and affect. Her behavior is normal.   Nursing note and vitals reviewed.       MEDICAL DECISION MAKING    Vitals:    Vitals:    07/26/18 1538   BP: (!) 141/63   Pulse: 84   Resp: 18   Temp: 98 °F (36.7 °C)   TempSrc: Oral   SpO2: 97% to edit the dictations but occasionally words are mis-transcribed.)    Electronically signed, Jina Sin PA-C,           Germaine Maloney PA-C  07/30/18 8082

## 2018-07-31 ENCOUNTER — HOSPITAL ENCOUNTER (OUTPATIENT)
Dept: CT IMAGING | Age: 58
Discharge: HOME | End: 2018-07-31
Payer: COMMERCIAL

## 2018-07-31 DIAGNOSIS — Z87.891 PERSONAL HISTORY OF TOBACCO USE: ICD-10-CM

## 2018-07-31 PROCEDURE — G0297 LDCT FOR LUNG CA SCREEN: HCPCS

## 2018-08-01 ENCOUNTER — TELEPHONE (OUTPATIENT)
Dept: CASE MANAGEMENT | Age: 58
End: 2018-08-01

## 2018-08-01 NOTE — TELEPHONE ENCOUNTER
Baseline lung screen on 7-31-18. LRAD2. Recommended screen in one year. Reviewed by ordering physician and ordering office contacts pt with results.

## 2018-08-28 ENCOUNTER — OFFICE VISIT (OUTPATIENT)
Dept: FAMILY MEDICINE CLINIC | Age: 58
End: 2018-08-28

## 2018-08-28 VITALS
SYSTOLIC BLOOD PRESSURE: 126 MMHG | HEART RATE: 78 BPM | BODY MASS INDEX: 32.22 KG/M2 | DIASTOLIC BLOOD PRESSURE: 70 MMHG | WEIGHT: 165 LBS | OXYGEN SATURATION: 98 %

## 2018-08-28 DIAGNOSIS — Z01.818 PRE-OP TESTING: Primary | ICD-10-CM

## 2018-08-28 PROCEDURE — G8417 CALC BMI ABV UP PARAM F/U: HCPCS | Performed by: FAMILY MEDICINE

## 2018-08-28 PROCEDURE — 3017F COLORECTAL CA SCREEN DOC REV: CPT | Performed by: FAMILY MEDICINE

## 2018-08-28 PROCEDURE — G8427 DOCREV CUR MEDS BY ELIG CLIN: HCPCS | Performed by: FAMILY MEDICINE

## 2018-08-28 PROCEDURE — 99243 OFF/OP CNSLTJ NEW/EST LOW 30: CPT | Performed by: FAMILY MEDICINE

## 2018-08-28 PROCEDURE — 93000 ELECTROCARDIOGRAM COMPLETE: CPT | Performed by: FAMILY MEDICINE

## 2018-08-28 NOTE — PROGRESS NOTES
[facility]  [phone]  Fax: [phone]   Pre-operative History and Physical      DIAGNOSIS: Bunion on right foot    PROCEDURE:  Bunionectomy      History Obtained From:  patient    HISTORY OF PRESENT ILLNESS:    The patient is a 56 y.o. female with right great toe pain and deformity who presents for  preoperative exam.       Past Medical History:   Diagnosis Date    Anxiety     [facility] [address]     Brain aneurysm     Zucarello    Closed angle glaucoma     COPD (chronic obstructive pulmonary disease) ([facility]     Hyperlipidemia     Hypertension     Hypothyroidism     Migraines     Open-angle glaucoma of both eyes     RLS (restless legs syndrome)     Rotator cuff disorder     right shoulder    TIA (transient ischemic attack) 11    Tobacco use disorder     Type II or unspecified type diabetes mellitus without mention of complication, not stated as uncontrolled     diet controlled     Past Surgical History:   Procedure Laterality Date    CARPAL TUNNEL RELEASE Bilateral     right and left     SECTION      x 2    CHOLECYSTECTOMY      COLONOSCOPY      COLONOSCOPY  2016    diverticulosis    CYST REMOVAL  2006    hidradenitis?     EYE SURGERY Right 16    cataract    EYE SURGERY Left 16    cataract removal    GLAUCOMA SURGERY Bilateral     twice in right eye    HYSTERECTOMY      total    OTHER SURGICAL HISTORY  2015    left oopherectomy    ROTATOR CUFF REPAIR Right  and Brinda 1 2013    x2    THROAT SURGERY  2018    mass revmoved vocal cord     UPPER GASTROINTESTINAL ENDOSCOPY  2016    small bowel bx    URETHRA SURGERY  2018     Current Outpatient Prescriptions   Medication Sig Dispense Refill    levothyroxine (SYNTHROID) 75 MCG tablet TAKE ONE TABLET BY MOUTH EVERY DAY 28 tablet 5    pravastatin (PRAVACHOL) 40 MG tablet TAKE 1 TABLET BY MOUTH EVERY EVENING 30 tablet 2    omeprazole (PRILOSEC) 40 MG delayed release capsule TAKE 1 CAPSULE BY MOUTH TWICE DAILY 60 capsule 2    nicotine polacrilex (NICORETTE) 2 MG gum Take 1 each by mouth as needed for Smoking cessation 100 each 3    oxybutynin (DITROPAN-XL) 5 MG extended release tablet TAKE 1 TABLET BY MOUTH EVERY DAY 30 tablet 2    VENTOLIN  (90 Base) MCG/ACT inhaler INHALE 2 PUFFS INTO THE LUNGS EVERY 4 HOURS AS NEEDED FOR WHEEZING 1 Inhaler 5    rOPINIRole (REQUIP) 2 MG tablet TAKE 1 TABLET BY MOUTH NIGHTLY AND THEN 1/2-1 TABLET BY MOUTH PRIOR TO NAPS 60 tablet 5    methocarbamol (ROBAXIN) 750 MG tablet       lithium 150 MG capsule Take 150 mg by mouth 2 times daily (with meals)       ipratropium-albuterol (DUONEB) 0.5-2.5 (3) MG/3ML SOLN nebulizer solution Inhale 3 mLs into the lungs      SYMBICORT 160-4.5 MCG/ACT AERO       fluticasone-vilanterol (BREO ELLIPTA) 100-25 MCG/INH AEPB inhaler Inhale 1 puff into the lungs daily 1 each 11    albuterol (PROVENTIL) (2.5 MG/3ML) 0.083% nebulizer solution Take 3 mLs by nebulization every 4 hours as needed for Wheezing 1080 mL 3    lisinopril (PRINIVIL;ZESTRIL) 10 MG tablet Take 10 mg by mouth daily      diclofenac sodium 1 % GEL       HYDROcodone-acetaminophen (NORCO) 5-325 MG per tablet       losartan (COZAAR) 50 MG tablet       venlafaxine (EFFEXOR XR) 150 MG extended release capsule       amLODIPine (NORVASC) 5 MG tablet Take 1 tablet by mouth daily 30 tablet 5    diazepam (VALIUM) 5 MG tablet Take 5 mg by mouth daily as needed for Anxiety      fluticasone (FLONASE) 50 MCG/ACT nasal spray 1 spray by Nasal route daily 1 Bottle 3    ESTROGENS CONJUGATED PO Take by mouth      traZODone (DESYREL) 50 MG tablet Take 50 mg by mouth nightly as needed       venlafaxine (EFFEXOR-XR) 75 MG XR capsule   Take 150 mg by mouth See Admin Instructions 150mg in morning, 75mg at night      lamotrigine (LAMICTAL) 100 MG tablet Take 100 mg by mouth 2 times daily.       timolol (BETIMOL) 0.5 % ophthalmic solution 1 drop 2 times

## 2018-08-29 ENCOUNTER — TELEPHONE (OUTPATIENT)
Dept: FAMILY MEDICINE CLINIC | Age: 58
End: 2018-08-29

## 2018-08-30 RX ORDER — LOSARTAN POTASSIUM 50 MG/1
TABLET ORAL
Qty: 90 TABLET | Refills: 3 | Status: SHIPPED | OUTPATIENT
Start: 2018-08-30 | End: 2019-08-24 | Stop reason: SDUPTHER

## 2018-09-12 RX ORDER — BUDESONIDE AND FORMOTEROL FUMARATE DIHYDRATE 160; 4.5 UG/1; UG/1
AEROSOL RESPIRATORY (INHALATION)
Qty: 10.2 G | Refills: 0 | OUTPATIENT
Start: 2018-09-12

## 2018-09-24 ENCOUNTER — HOSPITAL ENCOUNTER (OUTPATIENT)
Dept: WOMENS IMAGING | Age: 58
Discharge: HOME | End: 2018-09-24
Payer: COMMERCIAL

## 2018-09-24 DIAGNOSIS — Z12.31 ENCOUNTER FOR SCREENING MAMMOGRAM FOR BREAST CANCER: ICD-10-CM

## 2018-09-24 PROCEDURE — 77067 SCR MAMMO BI INCL CAD: CPT

## 2018-10-10 DIAGNOSIS — R32 URINARY INCONTINENCE, UNSPECIFIED TYPE: ICD-10-CM

## 2018-10-10 RX ORDER — PRAVASTATIN SODIUM 40 MG
TABLET ORAL
Qty: 30 TABLET | Refills: 0 | Status: SHIPPED | OUTPATIENT
Start: 2018-10-10 | End: 2018-11-08 | Stop reason: SDUPTHER

## 2018-10-10 RX ORDER — OMEPRAZOLE 40 MG/1
CAPSULE, DELAYED RELEASE ORAL
Qty: 60 CAPSULE | Refills: 0 | Status: SHIPPED | OUTPATIENT
Start: 2018-10-10 | End: 2018-11-08 | Stop reason: SDUPTHER

## 2018-10-10 RX ORDER — OXYBUTYNIN CHLORIDE 5 MG/1
TABLET, EXTENDED RELEASE ORAL
Qty: 30 TABLET | Refills: 3 | Status: SHIPPED | OUTPATIENT
Start: 2018-10-10 | End: 2018-11-08

## 2018-10-26 LAB
ALBUMIN SERPL-MCNC: 4.4 G/DL
ALP BLD-CCNC: 95 U/L
ALT SERPL-CCNC: 20 U/L
ANION GAP SERPL CALCULATED.3IONS-SCNC: NORMAL MMOL/L
AST SERPL-CCNC: 25 U/L
BILIRUB SERPL-MCNC: 0.2 MG/DL (ref 0.1–1.4)
BUN BLDV-MCNC: 13 MG/DL
CALCIUM SERPL-MCNC: 9.8 MG/DL
CHLORIDE BLD-SCNC: 99 MMOL/L
CO2: 16 MMOL/L
CREAT SERPL-MCNC: 0.78 MG/DL
GFR CALCULATED: NORMAL
GLUCOSE BLD-MCNC: 164 MG/DL
POTASSIUM SERPL-SCNC: 4.2 MMOL/L
SODIUM BLD-SCNC: 136 MMOL/L
TOTAL PROTEIN: 7.2

## 2018-11-02 ENCOUNTER — TELEPHONE (OUTPATIENT)
Dept: FAMILY MEDICINE CLINIC | Age: 58
End: 2018-11-02

## 2018-11-08 ENCOUNTER — OFFICE VISIT (OUTPATIENT)
Dept: FAMILY MEDICINE CLINIC | Age: 58
End: 2018-11-08
Payer: COMMERCIAL

## 2018-11-08 VITALS
DIASTOLIC BLOOD PRESSURE: 62 MMHG | SYSTOLIC BLOOD PRESSURE: 120 MMHG | OXYGEN SATURATION: 98 % | BODY MASS INDEX: 32.03 KG/M2 | HEART RATE: 68 BPM | WEIGHT: 164 LBS

## 2018-11-08 DIAGNOSIS — E03.9 HYPOTHYROIDISM, UNSPECIFIED TYPE: Primary | ICD-10-CM

## 2018-11-08 DIAGNOSIS — E11.00 UNCONTROLLED TYPE 2 DIABETES MELLITUS WITH HYPEROSMOLARITY WITHOUT COMA, WITHOUT LONG-TERM CURRENT USE OF INSULIN (HCC): ICD-10-CM

## 2018-11-08 DIAGNOSIS — Z23 NEED FOR PROPHYLACTIC VACCINATION AND INOCULATION AGAINST INFLUENZA: ICD-10-CM

## 2018-11-08 LAB
CREATININE URINE: 41.9 MG/DL (ref 28–259)
HBA1C MFR BLD: 6.8 %
MICROALBUMIN UR-MCNC: <1.2 MG/DL
MICROALBUMIN/CREAT UR-RTO: NORMAL MG/G (ref 0–30)

## 2018-11-08 PROCEDURE — G8427 DOCREV CUR MEDS BY ELIG CLIN: HCPCS | Performed by: FAMILY MEDICINE

## 2018-11-08 PROCEDURE — 2022F DILAT RTA XM EVC RTNOPTHY: CPT | Performed by: FAMILY MEDICINE

## 2018-11-08 PROCEDURE — 3044F HG A1C LEVEL LT 7.0%: CPT | Performed by: FAMILY MEDICINE

## 2018-11-08 PROCEDURE — 90686 IIV4 VACC NO PRSV 0.5 ML IM: CPT | Performed by: FAMILY MEDICINE

## 2018-11-08 PROCEDURE — 99214 OFFICE O/P EST MOD 30 MIN: CPT | Performed by: FAMILY MEDICINE

## 2018-11-08 PROCEDURE — 83036 HEMOGLOBIN GLYCOSYLATED A1C: CPT | Performed by: FAMILY MEDICINE

## 2018-11-08 PROCEDURE — 90471 IMMUNIZATION ADMIN: CPT | Performed by: FAMILY MEDICINE

## 2018-11-08 PROCEDURE — 4004F PT TOBACCO SCREEN RCVD TLK: CPT | Performed by: FAMILY MEDICINE

## 2018-11-08 PROCEDURE — G8417 CALC BMI ABV UP PARAM F/U: HCPCS | Performed by: FAMILY MEDICINE

## 2018-11-08 PROCEDURE — G8482 FLU IMMUNIZE ORDER/ADMIN: HCPCS | Performed by: FAMILY MEDICINE

## 2018-11-08 PROCEDURE — 3017F COLORECTAL CA SCREEN DOC REV: CPT | Performed by: FAMILY MEDICINE

## 2018-11-08 RX ORDER — OMEPRAZOLE 40 MG/1
CAPSULE, DELAYED RELEASE ORAL
Qty: 60 CAPSULE | Refills: 0 | Status: SHIPPED | OUTPATIENT
Start: 2018-11-08 | End: 2018-12-06 | Stop reason: SDUPTHER

## 2018-11-08 RX ORDER — LEVOTHYROXINE SODIUM 0.1 MG/1
TABLET ORAL
Qty: 30 TABLET | Refills: 5 | Status: SHIPPED | OUTPATIENT
Start: 2018-11-08 | End: 2019-04-29 | Stop reason: SDUPTHER

## 2018-11-08 RX ORDER — PRAVASTATIN SODIUM 40 MG
TABLET ORAL
Qty: 30 TABLET | Refills: 0 | Status: SHIPPED | OUTPATIENT
Start: 2018-11-08 | End: 2018-12-06 | Stop reason: SDUPTHER

## 2018-11-08 ASSESSMENT — PATIENT HEALTH QUESTIONNAIRE - PHQ9
SUM OF ALL RESPONSES TO PHQ QUESTIONS 1-9: 2
2. FEELING DOWN, DEPRESSED OR HOPELESS: 1
SUM OF ALL RESPONSES TO PHQ9 QUESTIONS 1 & 2: 2
SUM OF ALL RESPONSES TO PHQ QUESTIONS 1-9: 2
1. LITTLE INTEREST OR PLEASURE IN DOING THINGS: 1

## 2018-11-17 NOTE — PROGRESS NOTES
2018     Zenobia Vallejo (:  1960) is a 66 y.o. female, here for evaluation of the following medical concerns:    Zenobia Vallejo is a 66 y.o. female. Patient presents with:  Discuss Labs  Shoulder Pain: sharp in Lt shoulder  Knee Pain: wants to discuss having surgery on rt knee       Patient has been having knee pain and shoulder pain. This has been chronic. She would like to know whether to go through surgery at this time. She was told that she needed knee replacement, but patient is able to tolerate ambulation at this time. The patients PMH, surgical history, family history, medications, allergies were all reviewed and updated as appropriate today. Shoulder Pain      Knee Pain      Diabetes   She presents for her follow-up diabetic visit. She has type 2 diabetes mellitus. Her disease course has been stable. There are no hypoglycemic associated symptoms. There are no diabetic associated symptoms. There are no hypoglycemic complications. Symptoms are stable. There are no diabetic complications. When asked about current treatments, none were reported. She is compliant with treatment all of the time. Review of Systems    Prior to Visit Medications    Medication Sig Taking?  Authorizing Provider   pravastatin (PRAVACHOL) 40 MG tablet TAKE 1 TABLET BY MOUTH EVERY EVENING Yes VERONIKA Virk CNP   omeprazole (PRILOSEC) 40 MG delayed release capsule TAKE 1 CAPSULE BY MOUTH TWICE DAILY Yes VERONIKA Davey CNP   levothyroxine (SYNTHROID) 100 MCG tablet TAKE ONE TABLET BY MOUTH EVERY DAY Yes Jen Frankel, MD   VENTOLIN  (90 Base) MCG/ACT inhaler INHALE 2 PUFFS INTO THE LUNGS EVERY 4 HOURS AS NEEDED FOR WHEEZING Yes Stacy Tejeda MD   losartan (COZAAR) 50 MG tablet TAKE 1 TABLET BY MOUTH EVERY DAY Yes Jen Frankel, MD   nicotine polacrilex (NICORETTE) 2 MG gum Take 1 each by mouth as needed for Smoking cessation Yes Stacy Tejeda MD   rOPINIRole (REQUIP) 2 MG

## 2018-11-21 ENCOUNTER — OFFICE VISIT (OUTPATIENT)
Dept: FAMILY MEDICINE CLINIC | Age: 58
End: 2018-11-21
Payer: COMMERCIAL

## 2018-11-21 VITALS
HEART RATE: 79 BPM | SYSTOLIC BLOOD PRESSURE: 128 MMHG | DIASTOLIC BLOOD PRESSURE: 58 MMHG | WEIGHT: 164 LBS | OXYGEN SATURATION: 99 % | BODY MASS INDEX: 32.03 KG/M2

## 2018-11-21 DIAGNOSIS — Z01.818 PREOPERATIVE EXAMINATION: Primary | ICD-10-CM

## 2018-11-21 DIAGNOSIS — M25.561 RIGHT KNEE PAIN, UNSPECIFIED CHRONICITY: ICD-10-CM

## 2018-11-21 PROCEDURE — 3017F COLORECTAL CA SCREEN DOC REV: CPT | Performed by: FAMILY MEDICINE

## 2018-11-21 PROCEDURE — 99243 OFF/OP CNSLTJ NEW/EST LOW 30: CPT | Performed by: FAMILY MEDICINE

## 2018-11-21 PROCEDURE — G8482 FLU IMMUNIZE ORDER/ADMIN: HCPCS | Performed by: FAMILY MEDICINE

## 2018-11-21 PROCEDURE — 36415 COLL VENOUS BLD VENIPUNCTURE: CPT | Performed by: FAMILY MEDICINE

## 2018-11-21 PROCEDURE — G8417 CALC BMI ABV UP PARAM F/U: HCPCS | Performed by: FAMILY MEDICINE

## 2018-11-21 PROCEDURE — G8427 DOCREV CUR MEDS BY ELIG CLIN: HCPCS | Performed by: FAMILY MEDICINE

## 2018-11-21 NOTE — PROGRESS NOTES
[facility]  [phone]  Fax: [phone]   Pre-operative History and Physical      DIAGNOSIS:  Right knee pain with nodular synovitis    PROCEDURE:  Right knee arthroscopy and debridement      History Obtained From:  patient    HISTORY OF PRESENT ILLNESS:    The patient is a 66 y.o. female with significant past medical history of knee pain and swelling exacerbated with standing and sitting for long periods that has been present for about a year who presents for preoperative exam.       Past Medical History:   Diagnosis Date    Anxiety     Bipolar disorder ([address]     Brain aneurysm     Zucarello    Closed angle glaucoma     COPD [facility] disease) [facility]     Hyperlipidemia     Hypertension     Hypothyroidism     Migraines     Open-angle glaucoma of both eyes     RLS (restless legs syndrome)     Rotator cuff disorder     right shoulder    TIA (transient ischemic attack) 11    Tobacco use disorder     Type II or unspecified type diabetes mellitus without mention of complication, not stated as uncontrolled     diet controlled     Past Surgical History:   Procedure Laterality Date    CARPAL TUNNEL RELEASE Bilateral     right and left     SECTION      x 2    CHOLECYSTECTOMY      COLONOSCOPY      COLONOSCOPY  2016    diverticulosis    CYST REMOVAL  2006    hidradenitis?     EYE SURGERY Right 16    cataract    EYE SURGERY Left 16    cataract removal    GLAUCOMA SURGERY Bilateral     twice in right eye    HYSTERECTOMY      total    OTHER SURGICAL HISTORY  2015    left oopherectomy    ROTATOR CUFF REPAIR Right  and Brinda 1 2013    x2    THROAT SURGERY  2018    mass revmoved vocal cord     UPPER GASTROINTESTINAL ENDOSCOPY  2016    small bowel bx    URETHRA SURGERY  2018     Current Outpatient Prescriptions   Medication Sig Dispense Refill    pravastatin (PRAVACHOL) 40 MG tablet TAKE 1 TABLET BY MOUTH EVERY EVENING Normocephalic, without obvious abnormality, atramatic, sinuses nontender on palpation, external ears without lesions, oral pharynx with moist mucus membranes, tonsils without erythema or exudates, gums normal and good dentition. Neck:  Supple, symmetrical, trachea midline, no adenopathy, thyroid symmetric, not enlarged and no tenderness, skin normal, No carotid bruit    Heart:  Normal apical impulse, regular rate and rhythm, normal S1 and S2, no S3 or S4, and no murmur noted    Lungs:  No increased work of breathing, good air exchange, clear to auscultation bilaterally, no crackles or wheezing    Abdomen:  No scars, normal bowel sounds, soft, non-distended, non-tender, no masses palpated, no hepatosplenomegally    Extremities:  No clubbing, cyanosis, or edema    NEUROLOGIC:  Awake, alert, oriented to name, place and time. Cranial nerves II-XII are grossly intact. Motor is 5 out of 5 bilaterally. DATA:  EKG:  Date:  8/28/18  I have reviewed EKG with the following interpretation:  Impression: Normal          ASSESSMENT AND PLAN:    1. Patient is a 66 y.o. female with above specified procedure planned on 11/30/18 with Dr. Baltazar Torres at [facility]. 2. Stop ASA/NSAIDS medications 7-10 days prior to procedure.   3. Cleared for surgery    Jose Senior M.D    [facility] [place] [address], [place]  [address] [place] [address]

## 2018-11-22 LAB
ANION GAP SERPL CALCULATED.3IONS-SCNC: 13 MMOL/L (ref 3–16)
BUN BLDV-MCNC: 10 MG/DL (ref 7–20)
CALCIUM SERPL-MCNC: 10.3 MG/DL (ref 8.3–10.6)
CHLORIDE BLD-SCNC: 101 MMOL/L (ref 99–110)
CO2: 24 MMOL/L (ref 21–32)
CREAT SERPL-MCNC: 0.6 MG/DL (ref 0.6–1.1)
GFR AFRICAN AMERICAN: >60
GFR NON-AFRICAN AMERICAN: >60
GLUCOSE BLD-MCNC: 113 MG/DL (ref 70–99)
POTASSIUM SERPL-SCNC: 4.3 MMOL/L (ref 3.5–5.1)
SODIUM BLD-SCNC: 138 MMOL/L (ref 136–145)

## 2018-11-29 ENCOUNTER — TELEPHONE (OUTPATIENT)
Dept: FAMILY MEDICINE CLINIC | Age: 58
End: 2018-11-29

## 2018-11-29 NOTE — TELEPHONE ENCOUNTER
Pam with [facility] called and states that she needs a copy of the patient BMP that was drawn on 11.21.18.

## 2018-12-06 RX ORDER — PRAVASTATIN SODIUM 40 MG
TABLET ORAL
Qty: 30 TABLET | Refills: 1 | Status: SHIPPED | OUTPATIENT
Start: 2018-12-06 | End: 2019-01-30 | Stop reason: SDUPTHER

## 2018-12-06 RX ORDER — OMEPRAZOLE 40 MG/1
CAPSULE, DELAYED RELEASE ORAL
Qty: 60 CAPSULE | Refills: 1 | Status: SHIPPED | OUTPATIENT
Start: 2018-12-06 | End: 2019-01-30 | Stop reason: SDUPTHER

## 2018-12-13 ENCOUNTER — TELEPHONE (OUTPATIENT)
Dept: FAMILY MEDICINE CLINIC | Age: 58
End: 2018-12-13

## 2018-12-13 ENCOUNTER — OFFICE VISIT (OUTPATIENT)
Dept: FAMILY MEDICINE CLINIC | Age: 58
End: 2018-12-13
Payer: COMMERCIAL

## 2018-12-13 VITALS
OXYGEN SATURATION: 98 % | DIASTOLIC BLOOD PRESSURE: 80 MMHG | HEART RATE: 85 BPM | WEIGHT: 165 LBS | BODY MASS INDEX: 32.22 KG/M2 | SYSTOLIC BLOOD PRESSURE: 130 MMHG

## 2018-12-13 DIAGNOSIS — L02.91 ABSCESS: Primary | ICD-10-CM

## 2018-12-13 DIAGNOSIS — R05.9 COUGH: ICD-10-CM

## 2018-12-13 PROCEDURE — G8428 CUR MEDS NOT DOCUMENT: HCPCS | Performed by: NURSE PRACTITIONER

## 2018-12-13 PROCEDURE — 4004F PT TOBACCO SCREEN RCVD TLK: CPT | Performed by: NURSE PRACTITIONER

## 2018-12-13 PROCEDURE — 99214 OFFICE O/P EST MOD 30 MIN: CPT | Performed by: NURSE PRACTITIONER

## 2018-12-13 PROCEDURE — G8417 CALC BMI ABV UP PARAM F/U: HCPCS | Performed by: NURSE PRACTITIONER

## 2018-12-13 PROCEDURE — 3017F COLORECTAL CA SCREEN DOC REV: CPT | Performed by: NURSE PRACTITIONER

## 2018-12-13 PROCEDURE — G8482 FLU IMMUNIZE ORDER/ADMIN: HCPCS | Performed by: NURSE PRACTITIONER

## 2018-12-13 RX ORDER — SULFAMETHOXAZOLE AND TRIMETHOPRIM 800; 160 MG/1; MG/1
1 TABLET ORAL 2 TIMES DAILY
Qty: 20 TABLET | Refills: 0 | Status: SHIPPED | OUTPATIENT
Start: 2018-12-13 | End: 2018-12-23

## 2018-12-13 RX ORDER — DOXYCYCLINE HYCLATE 100 MG/1
100 CAPSULE ORAL 2 TIMES DAILY
Qty: 20 CAPSULE | Refills: 0 | Status: SHIPPED | OUTPATIENT
Start: 2018-12-13 | End: 2018-12-23

## 2018-12-13 ASSESSMENT — ENCOUNTER SYMPTOMS
SINUS PRESSURE: 0
CHEST TIGHTNESS: 0
SINUS PAIN: 0
COUGH: 1
SHORTNESS OF BREATH: 0

## 2018-12-13 NOTE — PROGRESS NOTES
2018     Yojana Barroso (:  1960) is a 66 y.o. female, here for evaluation of the following medical concerns: She is here today with a cough for 3 days along with a headache. She reports yellow sputum. No fever but reports chills. She has 2 abscesses under each breast with a red streak on the left breast.     Wound Check   She was originally treated 3 to 5 days ago. Previous treatment included wound cleansing or irrigation. Her temperature was unmeasured prior to arrival. There has been no drainage from the wound. The redness has worsened. The swelling has worsened. The pain has worsened. Cough   This is a new problem. The current episode started in the past 7 days. The problem has been unchanged. The problem occurs every few minutes. The cough is productive of purulent sputum. Associated symptoms include chills, headaches and postnasal drip. Pertinent negatives include no chest pain, ear congestion, ear pain, shortness of breath or sweats. She has tried nothing for the symptoms. The treatment provided no relief. Review of Systems   Constitutional: Positive for activity change and chills. HENT: Positive for postnasal drip. Negative for ear pain, sinus pain and sinus pressure. Respiratory: Positive for cough. Negative for chest tightness and shortness of breath. Cardiovascular: Negative for chest pain and leg swelling. Musculoskeletal: Negative. Skin: Positive for wound. Neurological: Positive for headaches. Psychiatric/Behavioral: Negative for confusion. Prior to Visit Medications    Medication Sig Taking? Authorizing Provider   doxycycline hyclate (VIBRAMYCIN) 100 MG capsule Take 1 capsule by mouth 2 times daily for 10 days Yes Mikey Yancey APRN - CNP   sulfamethoxazole-trimethoprim (BACTRIM DS) 800-160 MG per tablet Take 1 tablet by mouth 2 times daily for 10 days Yes Mikey Millers, APRN - CNP   mupirocin (BACTROBAN) 2 % ointment Apply 3 times daily.  Yes

## 2018-12-17 ENCOUNTER — OFFICE VISIT (OUTPATIENT)
Dept: FAMILY MEDICINE CLINIC | Age: 58
End: 2018-12-17
Payer: COMMERCIAL

## 2018-12-17 ENCOUNTER — HOSPITAL ENCOUNTER (EMERGENCY)
Age: 58
Discharge: HOME | End: 2018-12-17
Attending: EMERGENCY MEDICINE
Payer: COMMERCIAL

## 2018-12-17 VITALS
HEART RATE: 74 BPM | TEMPERATURE: 98.4 F | BODY MASS INDEX: 32.39 KG/M2 | WEIGHT: 165 LBS | OXYGEN SATURATION: 97 % | SYSTOLIC BLOOD PRESSURE: 119 MMHG | DIASTOLIC BLOOD PRESSURE: 51 MMHG | HEIGHT: 60 IN | RESPIRATION RATE: 16 BRPM

## 2018-12-17 VITALS
SYSTOLIC BLOOD PRESSURE: 130 MMHG | HEART RATE: 78 BPM | DIASTOLIC BLOOD PRESSURE: 80 MMHG | OXYGEN SATURATION: 98 % | WEIGHT: 165 LBS | BODY MASS INDEX: 32.22 KG/M2

## 2018-12-17 DIAGNOSIS — N61.1 BREAST ABSCESS OF FEMALE: Primary | ICD-10-CM

## 2018-12-17 DIAGNOSIS — L02.91 ABSCESS: Primary | ICD-10-CM

## 2018-12-17 DIAGNOSIS — R05.9 COUGH: ICD-10-CM

## 2018-12-17 LAB
A/G RATIO: 1.4 (ref 1.1–2.2)
ALBUMIN SERPL-MCNC: 4.4 G/DL (ref 3.4–5)
ALP BLD-CCNC: 101 U/L (ref 40–129)
ALT SERPL-CCNC: 12 U/L (ref 10–40)
ANION GAP SERPL CALCULATED.3IONS-SCNC: 12 MMOL/L (ref 3–16)
AST SERPL-CCNC: 13 U/L (ref 15–37)
BASOPHILS ABSOLUTE: 0.1 K/UL (ref 0–0.2)
BASOPHILS RELATIVE PERCENT: 1 %
BILIRUB SERPL-MCNC: <0.2 MG/DL (ref 0–1)
BUN BLDV-MCNC: 12 MG/DL (ref 7–20)
CALCIUM SERPL-MCNC: 9.5 MG/DL (ref 8.3–10.6)
CHLORIDE BLD-SCNC: 100 MMOL/L (ref 99–110)
CO2: 21 MMOL/L (ref 21–32)
CREAT SERPL-MCNC: 0.8 MG/DL (ref 0.6–1.1)
EOSINOPHILS ABSOLUTE: 0.5 K/UL (ref 0–0.6)
EOSINOPHILS RELATIVE PERCENT: 4.2 %
GFR AFRICAN AMERICAN: >60
GFR NON-AFRICAN AMERICAN: >60
GLOBULIN: 3.1 G/DL
GLUCOSE BLD-MCNC: 117 MG/DL (ref 70–99)
HCT VFR BLD CALC: 34.7 % (ref 36–48)
HEMOGLOBIN: 11.1 G/DL (ref 12–16)
LACTIC ACID, SEPSIS: 1.4 MMOL/L (ref 0.4–1.9)
LYMPHOCYTES ABSOLUTE: 3.5 K/UL (ref 1–5.1)
LYMPHOCYTES RELATIVE PERCENT: 28.8 %
MCH RBC QN AUTO: 27.3 PG (ref 26–34)
MCHC RBC AUTO-ENTMCNC: 32 G/DL (ref 31–36)
MCV RBC AUTO: 85.6 FL (ref 80–100)
MONOCYTES ABSOLUTE: 0.7 K/UL (ref 0–1.3)
MONOCYTES RELATIVE PERCENT: 5.9 %
NEUTROPHILS ABSOLUTE: 7.2 K/UL (ref 1.7–7.7)
NEUTROPHILS RELATIVE PERCENT: 60.1 %
PDW BLD-RTO: 16.6 % (ref 12.4–15.4)
PLATELET # BLD: 438 K/UL (ref 135–450)
PMV BLD AUTO: 7.1 FL (ref 5–10.5)
POTASSIUM SERPL-SCNC: 4 MMOL/L (ref 3.5–5.1)
RBC # BLD: 4.05 M/UL (ref 4–5.2)
SODIUM BLD-SCNC: 133 MMOL/L (ref 136–145)
TOTAL PROTEIN: 7.5 G/DL (ref 6.4–8.2)
WBC # BLD: 12 K/UL (ref 4–11)

## 2018-12-17 PROCEDURE — 36415 COLL VENOUS BLD VENIPUNCTURE: CPT

## 2018-12-17 PROCEDURE — 3017F COLORECTAL CA SCREEN DOC REV: CPT | Performed by: NURSE PRACTITIONER

## 2018-12-17 PROCEDURE — 80053 COMPREHEN METABOLIC PANEL: CPT

## 2018-12-17 PROCEDURE — 99214 OFFICE O/P EST MOD 30 MIN: CPT | Performed by: NURSE PRACTITIONER

## 2018-12-17 PROCEDURE — 6360000002 HC RX W HCPCS: Performed by: PHYSICIAN ASSISTANT

## 2018-12-17 PROCEDURE — 99283 EMERGENCY DEPT VISIT LOW MDM: CPT

## 2018-12-17 PROCEDURE — 96365 THER/PROPH/DIAG IV INF INIT: CPT

## 2018-12-17 PROCEDURE — 83605 ASSAY OF LACTIC ACID: CPT

## 2018-12-17 PROCEDURE — 4004F PT TOBACCO SCREEN RCVD TLK: CPT | Performed by: NURSE PRACTITIONER

## 2018-12-17 PROCEDURE — 2580000003 HC RX 258: Performed by: PHYSICIAN ASSISTANT

## 2018-12-17 PROCEDURE — G8482 FLU IMMUNIZE ORDER/ADMIN: HCPCS | Performed by: NURSE PRACTITIONER

## 2018-12-17 PROCEDURE — G8417 CALC BMI ABV UP PARAM F/U: HCPCS | Performed by: NURSE PRACTITIONER

## 2018-12-17 PROCEDURE — G8428 CUR MEDS NOT DOCUMENT: HCPCS | Performed by: NURSE PRACTITIONER

## 2018-12-17 PROCEDURE — 85025 COMPLETE CBC W/AUTO DIFF WBC: CPT

## 2018-12-17 RX ORDER — CLINDAMYCIN HYDROCHLORIDE 150 MG/1
450 CAPSULE ORAL 3 TIMES DAILY
Qty: 90 CAPSULE | Refills: 0 | Status: SHIPPED | OUTPATIENT
Start: 2018-12-17 | End: 2018-12-24

## 2018-12-17 RX ORDER — GUAIFENESIN AND CODEINE PHOSPHATE 100; 10 MG/5ML; MG/5ML
5 SOLUTION ORAL EVERY 4 HOURS PRN
Qty: 180 ML | Refills: 0 | Status: SHIPPED | OUTPATIENT
Start: 2018-12-17 | End: 2018-12-27 | Stop reason: SDUPTHER

## 2018-12-17 RX ADMIN — VANCOMYCIN HYDROCHLORIDE 1000 MG: 1 INJECTION, POWDER, LYOPHILIZED, FOR SOLUTION INTRAVENOUS at 14:35

## 2018-12-17 ASSESSMENT — ENCOUNTER SYMPTOMS
TROUBLE SWALLOWING: 0
SINUS PAIN: 0
ABDOMINAL PAIN: 0
COLOR CHANGE: 0
COUGH: 1
NAUSEA: 0
SORE THROAT: 0
EYE DISCHARGE: 0
GASTROINTESTINAL NEGATIVE: 1
SHORTNESS OF BREATH: 0
CHEST TIGHTNESS: 0
RESPIRATORY NEGATIVE: 1
BACK PAIN: 0
SINUS PRESSURE: 0

## 2018-12-17 ASSESSMENT — PAIN SCALES - GENERAL: PAINLEVEL_OUTOF10: 6

## 2018-12-17 ASSESSMENT — PAIN DESCRIPTION - LOCATION: LOCATION: BREAST

## 2018-12-17 ASSESSMENT — PAIN DESCRIPTION - ORIENTATION: ORIENTATION: LEFT

## 2018-12-17 ASSESSMENT — PAIN DESCRIPTION - PAIN TYPE: TYPE: ACUTE PAIN

## 2018-12-17 NOTE — ED PROVIDER NOTES
 COPD (chronic obstructive pulmonary disease) (HCC)     Hyperlipidemia     Hypertension     Hypothyroidism     Migraines     Open-angle glaucoma of both eyes     RLS (restless legs syndrome)     Rotator cuff disorder     right shoulder    TIA (transient ischemic attack) 11    Tobacco use disorder     Type II or unspecified type diabetes mellitus without mention of complication, not stated as uncontrolled     diet controlled         SURGICAL HISTORY     Past Surgical History:   Procedure Laterality Date    CARPAL TUNNEL RELEASE Bilateral     right and left     SECTION      x 2    CHOLECYSTECTOMY      COLONOSCOPY      COLONOSCOPY  2016    diverticulosis    CYST REMOVAL  2006    hidradenitis?  EYE SURGERY Right 16    cataract    EYE SURGERY Left 16    cataract removal    GLAUCOMA SURGERY Bilateral     twice in right eye    HYSTERECTOMY      total    OTHER SURGICAL HISTORY  2015    left oopherectomy    ROTATOR CUFF REPAIR Right  and Brinda 1 2013    x2    THROAT SURGERY  2018    mass revmoved vocal cord     UPPER GASTROINTESTINAL ENDOSCOPY  2016    small bowel bx    URETHRA SURGERY  2018         CURRENTMEDICATIONS       Discharge Medication List as of 2018  3:48 PM      CONTINUE these medications which have NOT CHANGED    Details   guaiFENesin-codeine (CHERATUSSIN AC) 100-10 MG/5ML syrup Take 5 mLs by mouth every 4 hours as needed for Cough for up to 7 days. ., Disp-180 mL, R-0Normal      doxycycline hyclate (VIBRAMYCIN) 100 MG capsule Take 1 capsule by mouth 2 times daily for 10 days, Disp-20 capsule, R-0Normal      sulfamethoxazole-trimethoprim (BACTRIM DS) 800-160 MG per tablet Take 1 tablet by mouth 2 times daily for 10 days, Disp-20 tablet, R-0Normal      mupirocin (BACTROBAN) 2 % ointment Apply 3 times daily. , Disp-15 g, R-0, Normal      pravastatin (PRAVACHOL) 40 MG tablet TAKE 1 TABLET BY MOUTH EVERY EVENING, Disp-30 Non- >60 >60    GFR African American >60 >60    Calcium 9.5 8.3 - 10.6 mg/dL    Total Protein 7.5 6.4 - 8.2 g/dL    Alb 4.4 3.4 - 5.0 g/dL    Albumin/Globulin Ratio 1.4 1.1 - 2.2    Total Bilirubin <0.2 0.0 - 1.0 mg/dL    Alkaline Phosphatase 101 40 - 129 U/L    ALT 12 10 - 40 U/L    AST 13 (L) 15 - 37 U/L    Globulin 3.1 g/dL   Lactate, Sepsis   Result Value Ref Range    Lactic Acid, Sepsis 1.4 0.4 - 1.9 mmol/L         I estimate there is LOW risk for CELLULITIS, COMPARTMENT SYNDROME, NECROTIZING FASCIITIS, TENDON OR NEUROVASCULAR INJURY, or FOREIGN BODY, thus I consider the discharge disposition reasonable. Also, there is no evidence or peritonitis, sepsis, or toxicity. Cotter Mealing and I have discussed the diagnosis and risks, and we agree with discharging home to follow-up with their primary doctor. We also discussed returning to the Emergency Department immediately if new or worsening symptoms occur. We have discussed the symptoms which are most concerning (e.g., changing or worsening pain, fever, numbness, weakness, cool or painful digits) that necessitate immediate return. Final Impression    1. Breast abscess of female        Discharge Vital Signs:  Blood pressure (!) 119/51, pulse 74, temperature 98.4 °F (36.9 °C), temperature source Oral, resp. rate 16, height 5' (1.524 m), weight 165 lb (74.8 kg), SpO2 97 %, not currently breastfeeding. FINAL IMPRESSION      1.  Breast abscess of female          DISPOSITION/PLAN   DISPOSITION Decision To Discharge 12/17/2018 03:45:46 PM      PATIENT REFERREDTO:  Vivienne Burks MD  [address] [place] [address] [address]uca [address] [place] [id number] [facility]    Schedule an appointment as soon as possible for a visit in 2 days        DISCHARGE MEDICATIONS:  Discharge Medication List as of 12/17/2018  3:48 PM      START taking these medications    Details   clindamycin (CLEOCIN) 150 MG capsule Take 3 capsules by mouth 3 times daily for 7 days, Disp-90 capsule,

## 2018-12-17 NOTE — PROGRESS NOTES
2018     Beatrice Da Silva (:  1960) is a 66 y.o. female, here for evaluation of the following medical concerns: She is here today for a follow-up. She is seeing changes in the appearence of the abscesses. She is stating the abscesses are painful. The left breast is open at this time. Both breasts are more red than last week. She had drainage from the left breast. Denies fever. Wound Check   She was originally treated 5 to 10 days ago. Previous treatment included oral antibiotics. Her temperature was unmeasured prior to arrival. There has been colored discharge from the wound. The redness has worsened. The swelling has worsened. The pain has worsened. Review of Systems   Constitutional: Negative for appetite change, chills, diaphoresis, fatigue and fever. HENT: Negative for congestion, ear pain, postnasal drip, sinus pain, sinus pressure, sore throat and trouble swallowing. Eyes: Negative for discharge. Respiratory: Positive for cough. Negative for chest tightness and shortness of breath. Choking: dry. Cardiovascular: Negative for chest pain, palpitations and leg swelling. Gastrointestinal: Negative for abdominal pain and nausea. Endocrine: Negative for cold intolerance, heat intolerance and polyuria. Musculoskeletal: Negative for back pain and gait problem. Skin: Negative for color change and rash. Allergic/Immunologic: Negative for environmental allergies, food allergies and immunocompromised state. Neurological: Negative for dizziness, weakness and headaches. Hematological: Does not bruise/bleed easily. Psychiatric/Behavioral: Negative for confusion and sleep disturbance. The patient is not nervous/anxious. Prior to Visit Medications    Medication Sig Taking? Authorizing Provider   guaiFENesin-codeine (CHERATUSSIN AC) 100-10 MG/5ML syrup Take 5 mLs by mouth every 4 hours as needed for Cough for up to 7 days. Jamaal Tolbert, APRN - CNP doxycycline hyclate (VIBRAMYCIN) 100 MG capsule Take 1 capsule by mouth 2 times daily for 10 days Yes VERONIKA Haskins CNP   sulfamethoxazole-trimethoprim (BACTRIM DS) 800-160 MG per tablet Take 1 tablet by mouth 2 times daily for 10 days Yes VERONIKA Haskins CNP   mupirocin (BACTROBAN) 2 % ointment Apply 3 times daily.  Yes VERONIKA Haskins CNP   pravastatin (PRAVACHOL) 40 MG tablet TAKE 1 TABLET BY MOUTH EVERY EVENING Yes VERONIKA Haskins CNP   omeprazole (PRILOSEC) 40 MG delayed release capsule TAKE 1 CAPSULE BY MOUTH TWICE DAILY Yes VERONIKA Haskins CNP   rOPINIRole (REQUIP) 2 MG tablet TAKE 1 TABLET BY MOUTH NIGHTLY AND THEN 1/2-1 TABLET BY MOUTH PRIOR TO NAPS Yes Sonia Singletary MD   levothyroxine (SYNTHROID) 100 MCG tablet TAKE ONE TABLET BY MOUTH EVERY DAY Yes Agustín Olvera MD   VENTOLIN  (90 Base) MCG/ACT inhaler INHALE 2 PUFFS INTO THE LUNGS EVERY 4 HOURS AS NEEDED FOR WHEEZING Yes Sonia Singletary MD   losartan (COZAAR) 50 MG tablet TAKE 1 TABLET BY MOUTH EVERY DAY Yes Agustín Olvera MD   nicotine polacrilex (NICORETTE) 2 MG gum Take 1 each by mouth as needed for Smoking cessation Yes Sonia Singletary MD   methocarbamol (ROBAXIN) 750 MG tablet  Yes Historical Provider, MD   lithium 150 MG capsule Take 150 mg by mouth 3 times daily (with meals)  Yes Historical Provider, MD   ipratropium-albuterol (DUONEB) 0.5-2.5 (3) MG/3ML SOLN nebulizer solution Inhale 3 mLs into the lungs Yes Historical Provider, MD   SYMBICORT 160-4.5 MCG/ACT AERO  Yes Historical Provider, MD   fluticasone-vilanterol (BREO ELLIPTA) 100-25 MCG/INH AEPB inhaler Inhale 1 puff into the lungs daily Yes Sonia Singletary MD   albuterol (PROVENTIL) (2.5 MG/3ML) 0.083% nebulizer solution Take 3 mLs by nebulization every 4 hours as needed for Wheezing Yes Agustín Olvera MD   lisinopril (PRINIVIL;ZESTRIL) 10 MG tablet Take 10 mg by mouth daily Yes Historical Provider, MD   diclofenac sodium 1 % GEL  Yes

## 2018-12-17 NOTE — ED NOTES
Pt given discharge instructions. Pt encouraged to follow up as directed and to return to ED with any worsening symptoms. Pt verbalized understanding. Pt PWD, breathing easy and unlabored. Stable upon discharge. Ambulated out of ED with steady gait.        Claire Monet RN  12/17/18 [id number]

## 2018-12-19 LAB
GRAM STAIN RESULT: ABNORMAL
WOUND/ABSCESS: ABNORMAL

## 2018-12-27 ENCOUNTER — OFFICE VISIT (OUTPATIENT)
Dept: FAMILY MEDICINE CLINIC | Age: 58
End: 2018-12-27
Payer: COMMERCIAL

## 2018-12-27 VITALS
SYSTOLIC BLOOD PRESSURE: 132 MMHG | OXYGEN SATURATION: 97 % | WEIGHT: 167 LBS | DIASTOLIC BLOOD PRESSURE: 80 MMHG | HEART RATE: 64 BPM | BODY MASS INDEX: 32.61 KG/M2

## 2018-12-27 DIAGNOSIS — R53.83 FATIGUE, UNSPECIFIED TYPE: ICD-10-CM

## 2018-12-27 DIAGNOSIS — L02.91 ABSCESS: Primary | ICD-10-CM

## 2018-12-27 DIAGNOSIS — R05.9 COUGH: ICD-10-CM

## 2018-12-27 PROCEDURE — 4004F PT TOBACCO SCREEN RCVD TLK: CPT | Performed by: NURSE PRACTITIONER

## 2018-12-27 PROCEDURE — G8417 CALC BMI ABV UP PARAM F/U: HCPCS | Performed by: NURSE PRACTITIONER

## 2018-12-27 PROCEDURE — 99214 OFFICE O/P EST MOD 30 MIN: CPT | Performed by: NURSE PRACTITIONER

## 2018-12-27 PROCEDURE — 3017F COLORECTAL CA SCREEN DOC REV: CPT | Performed by: NURSE PRACTITIONER

## 2018-12-27 PROCEDURE — G8482 FLU IMMUNIZE ORDER/ADMIN: HCPCS | Performed by: NURSE PRACTITIONER

## 2018-12-27 PROCEDURE — G8427 DOCREV CUR MEDS BY ELIG CLIN: HCPCS | Performed by: NURSE PRACTITIONER

## 2018-12-27 RX ORDER — BENZONATATE 200 MG/1
200 CAPSULE ORAL 3 TIMES DAILY PRN
Qty: 30 CAPSULE | Refills: 0 | Status: SHIPPED | OUTPATIENT
Start: 2018-12-27 | End: 2019-01-03

## 2018-12-27 RX ORDER — GUAIFENESIN AND CODEINE PHOSPHATE 100; 10 MG/5ML; MG/5ML
5 SOLUTION ORAL EVERY 4 HOURS PRN
Qty: 180 ML | Refills: 0 | Status: SHIPPED | OUTPATIENT
Start: 2018-12-27 | End: 2019-01-03

## 2018-12-27 ASSESSMENT — ENCOUNTER SYMPTOMS
SORE THROAT: 0
SHORTNESS OF BREATH: 0
COUGH: 0
NAUSEA: 0
SINUS PRESSURE: 0
CHEST TIGHTNESS: 0
COLOR CHANGE: 0
ABDOMINAL PAIN: 0
EYE DISCHARGE: 0
TROUBLE SWALLOWING: 0
BACK PAIN: 0
SINUS PAIN: 0

## 2019-01-23 ENCOUNTER — OFFICE VISIT (OUTPATIENT)
Dept: PULMONOLOGY | Age: 59
End: 2019-01-23
Payer: COMMERCIAL

## 2019-01-23 VITALS
HEIGHT: 60 IN | BODY MASS INDEX: 31.02 KG/M2 | DIASTOLIC BLOOD PRESSURE: 72 MMHG | OXYGEN SATURATION: 98 % | HEART RATE: 78 BPM | WEIGHT: 158 LBS | SYSTOLIC BLOOD PRESSURE: 132 MMHG | RESPIRATION RATE: 16 BRPM | TEMPERATURE: 97.9 F

## 2019-01-23 DIAGNOSIS — J43.2 CENTRILOBULAR EMPHYSEMA (HCC): Primary | ICD-10-CM

## 2019-01-23 DIAGNOSIS — G25.81 RLS (RESTLESS LEGS SYNDROME): ICD-10-CM

## 2019-01-23 DIAGNOSIS — Z87.891 PERSONAL HISTORY OF TOBACCO USE: ICD-10-CM

## 2019-01-23 PROCEDURE — 99214 OFFICE O/P EST MOD 30 MIN: CPT | Performed by: INTERNAL MEDICINE

## 2019-01-23 PROCEDURE — 3023F SPIROM DOC REV: CPT | Performed by: INTERNAL MEDICINE

## 2019-01-23 PROCEDURE — 4004F PT TOBACCO SCREEN RCVD TLK: CPT | Performed by: INTERNAL MEDICINE

## 2019-01-23 PROCEDURE — G8427 DOCREV CUR MEDS BY ELIG CLIN: HCPCS | Performed by: INTERNAL MEDICINE

## 2019-01-23 PROCEDURE — G8926 SPIRO NO PERF OR DOC: HCPCS | Performed by: INTERNAL MEDICINE

## 2019-01-23 PROCEDURE — 3017F COLORECTAL CA SCREEN DOC REV: CPT | Performed by: INTERNAL MEDICINE

## 2019-01-23 PROCEDURE — G8482 FLU IMMUNIZE ORDER/ADMIN: HCPCS | Performed by: INTERNAL MEDICINE

## 2019-01-23 PROCEDURE — G8417 CALC BMI ABV UP PARAM F/U: HCPCS | Performed by: INTERNAL MEDICINE

## 2019-01-23 RX ORDER — ROPINIROLE 2 MG/1
TABLET, FILM COATED ORAL
Qty: 60 TABLET | Refills: 5 | Status: SHIPPED | OUTPATIENT
Start: 2019-01-23 | End: 2019-07-26 | Stop reason: SDUPTHER

## 2019-01-23 RX ORDER — PREDNISONE 10 MG/1
TABLET ORAL
Qty: 15 TABLET | Refills: 0 | Status: SHIPPED | OUTPATIENT
Start: 2019-01-23 | End: 2019-07-31 | Stop reason: ALTCHOICE

## 2019-01-30 DIAGNOSIS — R32 URINARY INCONTINENCE, UNSPECIFIED TYPE: ICD-10-CM

## 2019-01-30 RX ORDER — OMEPRAZOLE 40 MG/1
CAPSULE, DELAYED RELEASE ORAL
Qty: 60 CAPSULE | Refills: 1 | Status: SHIPPED | OUTPATIENT
Start: 2019-01-30 | End: 2019-04-01 | Stop reason: SDUPTHER

## 2019-01-30 RX ORDER — OXYBUTYNIN CHLORIDE 5 MG/1
TABLET, EXTENDED RELEASE ORAL
Qty: 30 TABLET | Refills: 3 | Status: SHIPPED | OUTPATIENT
Start: 2019-01-30 | End: 2019-05-28 | Stop reason: SDUPTHER

## 2019-01-30 RX ORDER — PRAVASTATIN SODIUM 40 MG
TABLET ORAL
Qty: 30 TABLET | Refills: 1 | Status: SHIPPED | OUTPATIENT
Start: 2019-01-30 | End: 2019-04-01 | Stop reason: SDUPTHER

## 2019-02-09 ENCOUNTER — HOSPITAL ENCOUNTER (OUTPATIENT)
Age: 59
Discharge: HOME | End: 2019-02-09
Payer: COMMERCIAL

## 2019-02-09 ENCOUNTER — HOSPITAL ENCOUNTER (OUTPATIENT)
Dept: CT IMAGING | Age: 59
Discharge: HOME | End: 2019-02-09
Payer: COMMERCIAL

## 2019-02-09 DIAGNOSIS — I67.1 CEREBRAL ANEURYSM: ICD-10-CM

## 2019-02-09 LAB
BUN BLDV-MCNC: 9 MG/DL (ref 7–20)
CREAT SERPL-MCNC: 0.7 MG/DL (ref 0.6–1.1)
GFR AFRICAN AMERICAN: >60
GFR NON-AFRICAN AMERICAN: >60

## 2019-02-09 PROCEDURE — 36415 COLL VENOUS BLD VENIPUNCTURE: CPT

## 2019-02-09 PROCEDURE — 82565 ASSAY OF CREATININE: CPT

## 2019-02-09 PROCEDURE — 6360000004 HC RX CONTRAST MEDICATION: Performed by: NEUROLOGICAL SURGERY

## 2019-02-09 PROCEDURE — 70496 CT ANGIOGRAPHY HEAD: CPT

## 2019-02-09 PROCEDURE — 84520 ASSAY OF UREA NITROGEN: CPT

## 2019-02-09 RX ADMIN — IOPAMIDOL 85 ML: 755 INJECTION, SOLUTION INTRAVENOUS at 10:18

## 2019-02-26 ENCOUNTER — OFFICE VISIT (OUTPATIENT)
Dept: FAMILY MEDICINE CLINIC | Age: 59
End: 2019-02-26
Payer: COMMERCIAL

## 2019-02-26 ENCOUNTER — TELEPHONE (OUTPATIENT)
Dept: FAMILY MEDICINE CLINIC | Age: 59
End: 2019-02-26

## 2019-02-26 VITALS
TEMPERATURE: 98.1 F | SYSTOLIC BLOOD PRESSURE: 148 MMHG | BODY MASS INDEX: 31.72 KG/M2 | OXYGEN SATURATION: 98 % | DIASTOLIC BLOOD PRESSURE: 64 MMHG | HEART RATE: 70 BPM | WEIGHT: 162.4 LBS

## 2019-02-26 DIAGNOSIS — K14.5 FISSURE OF TONGUE: Primary | ICD-10-CM

## 2019-02-26 DIAGNOSIS — G45.9 TIA (TRANSIENT ISCHEMIC ATTACK): ICD-10-CM

## 2019-02-26 DIAGNOSIS — J43.2 CENTRILOBULAR EMPHYSEMA (HCC): ICD-10-CM

## 2019-02-26 DIAGNOSIS — I77.9 BILATERAL CAROTID ARTERY DISEASE, UNSPECIFIED TYPE (HCC): Primary | ICD-10-CM

## 2019-02-26 DIAGNOSIS — E11.00 UNCONTROLLED TYPE 2 DIABETES MELLITUS WITH HYPEROSMOLARITY WITHOUT COMA, WITHOUT LONG-TERM CURRENT USE OF INSULIN (HCC): ICD-10-CM

## 2019-02-26 DIAGNOSIS — M54.2 NECK PAIN OF OVER 3 MONTHS DURATION: ICD-10-CM

## 2019-02-26 PROCEDURE — 3017F COLORECTAL CA SCREEN DOC REV: CPT | Performed by: NURSE PRACTITIONER

## 2019-02-26 PROCEDURE — G8427 DOCREV CUR MEDS BY ELIG CLIN: HCPCS | Performed by: NURSE PRACTITIONER

## 2019-02-26 PROCEDURE — 4004F PT TOBACCO SCREEN RCVD TLK: CPT | Performed by: NURSE PRACTITIONER

## 2019-02-26 PROCEDURE — 99213 OFFICE O/P EST LOW 20 MIN: CPT | Performed by: NURSE PRACTITIONER

## 2019-02-26 PROCEDURE — G8482 FLU IMMUNIZE ORDER/ADMIN: HCPCS | Performed by: NURSE PRACTITIONER

## 2019-02-26 PROCEDURE — G8417 CALC BMI ABV UP PARAM F/U: HCPCS | Performed by: NURSE PRACTITIONER

## 2019-02-26 ASSESSMENT — ENCOUNTER SYMPTOMS
ABDOMINAL PAIN: 0
SORE THROAT: 1
SHORTNESS OF BREATH: 0
COUGH: 0
EYES NEGATIVE: 1
CHEST TIGHTNESS: 0

## 2019-03-01 RX ORDER — FLUTICASONE FUROATE AND VILANTEROL TRIFENATATE 100; 25 UG/1; UG/1
1 POWDER RESPIRATORY (INHALATION) DAILY
Qty: 60 EACH | Refills: 5 | Status: SHIPPED | OUTPATIENT
Start: 2019-03-01 | End: 2019-08-24 | Stop reason: SDUPTHER

## 2019-04-01 RX ORDER — OMEPRAZOLE 40 MG/1
CAPSULE, DELAYED RELEASE ORAL
Qty: 60 CAPSULE | Refills: 1 | Status: SHIPPED | OUTPATIENT
Start: 2019-04-01 | End: 2019-05-27 | Stop reason: SDUPTHER

## 2019-04-01 RX ORDER — PRAVASTATIN SODIUM 40 MG
TABLET ORAL
Qty: 30 TABLET | Refills: 1 | Status: SHIPPED | OUTPATIENT
Start: 2019-04-01 | End: 2019-05-27 | Stop reason: SDUPTHER

## 2019-04-08 NOTE — PROGRESS NOTES
PRE OP INSTRUCTION SHEET   1. Do not eat or drink anything after 12 midnight  prior to surgery. This includes no water, chewing gum or mints. 2. Take the following pills will a small sip of water (see MAR)                                        3. Aspirin, Ibuprofen, Advil, Naproxen, Vitamin E, fish oil and other Anti-inflammatory products should be stopped for 5 days before surgery or as directed by your physician. 4. Check with your Doctor regarding stopping Plavix, Coumadin, Lovenox, Fragmin or other blood thinners   5. Do not smoke, and do not drink any alcoholic beverages 24 hours prior to surgery. This includes NA Beer. 6. You may brush your teeth and gargle the morning of surgery. DO NOT SWALLOW WATER   7. You MUST make arrangements for a responsible adult to take you home after your surgery. You will not be allowed to leave alone or drive yourself home. It is strongly suggested someone stay with you the first 24 hrs. Your surgery will be cancelled if you do not have a ride home. 8. A parent/legal guardian must accompany a child scheduled for surgery and plan to stay at the hospital until the child is discharged. Please do not bring other children with you. 9. Please wear simple, loose fitting clothing to the hospital.  Germania Hunterlyudmila not bring valuables (money, credit cards, checkbooks, etc.) Do not wear any makeup (including no eye makeup) or nail polish on your fingers or toes. 10. DO NOT wear any jewelry or piercings on day of surgery. All body piercing jewelry must be removed. 11. If you have dentures,glasses, or contacts they will be removed before going to the OR; we will provide you a container. 12. Please see your family doctor/and cardiologist for a history & physical and/or concerning medications. Bring any test results/reports from your physician's office. Have history and labs faxed to [phone].  Remember to bring Blood Bank bracelet on the day of surgery. 14. If you have a Living Will and Durable Power of  for Healthcare, please bring in a copy. 13. Notify your Surgeon if you develop any illness between now and surgery  time, cough, cold, fever, sore throat, nausea, vomiting, etc.  Please notify your surgeon if you experience dizziness, shortness of breath or blurred vision between now & the time of your surgery   16. DO NOT shave your operative site 96 hours prior to surgery. For face & neck surgery, men may use an electric razor 48 hours prior to surgery. 17. Shower with _x__Antibacterial soap (x_chlorhexidine for total joint  Pt's) shower two times before surgery.(the morning of and the night before. 18. To provide excellent care visitors will be limited to one in the room at any given time.   Please call pre admission testing if you any further questions [phone] or [phone]

## 2019-04-09 ENCOUNTER — OFFICE VISIT (OUTPATIENT)
Dept: FAMILY MEDICINE CLINIC | Age: 59
End: 2019-04-09
Payer: COMMERCIAL

## 2019-04-09 ENCOUNTER — ANESTHESIA EVENT (OUTPATIENT)
Dept: OPERATING ROOM | Age: 59
End: 2019-04-09
Payer: COMMERCIAL

## 2019-04-09 VITALS
TEMPERATURE: 98.4 F | DIASTOLIC BLOOD PRESSURE: 60 MMHG | SYSTOLIC BLOOD PRESSURE: 116 MMHG | OXYGEN SATURATION: 98 % | BODY MASS INDEX: 30.86 KG/M2 | WEIGHT: 158 LBS | HEART RATE: 73 BPM

## 2019-04-09 DIAGNOSIS — Z01.818 PRE-OP EXAMINATION: Primary | ICD-10-CM

## 2019-04-09 DIAGNOSIS — R32 URINARY INCONTINENCE, UNSPECIFIED TYPE: ICD-10-CM

## 2019-04-09 PROCEDURE — G8417 CALC BMI ABV UP PARAM F/U: HCPCS | Performed by: NURSE PRACTITIONER

## 2019-04-09 PROCEDURE — G8427 DOCREV CUR MEDS BY ELIG CLIN: HCPCS | Performed by: NURSE PRACTITIONER

## 2019-04-09 PROCEDURE — 3017F COLORECTAL CA SCREEN DOC REV: CPT | Performed by: NURSE PRACTITIONER

## 2019-04-09 PROCEDURE — 99242 OFF/OP CONSLTJ NEW/EST SF 20: CPT | Performed by: NURSE PRACTITIONER

## 2019-04-09 RX ORDER — BLOOD-GLUCOSE METER
1 KIT MISCELLANEOUS DAILY
Qty: 1 DEVICE | Refills: 0 | Status: SHIPPED | OUTPATIENT
Start: 2019-04-09 | End: 2020-01-27

## 2019-04-09 NOTE — PROGRESS NOTES
[facility]  [phone]  Fax: [phone]   Pre-operative History and Physical      DIAGNOSIS:  Urinary Incontinence     PROCEDURE:  CYSTOSCOPY, TVT TRANS VAGINAL TAPING WITH [employer] ADVANTAGE KIT, CYSTOCELE REPAIR-NO MESH      History Obtained From:  patient    HISTORY OF PRESENT ILLNESS:    The patient is a 66 y.o. female with significant past medical history of urinary incontinence who presents today for a pre-op exam.        Past Medical History:   Diagnosis Date    Anxiety     B[facility] disorder ([facility]     Brain aneurysm     Zucarello    Closed angle glaucoma     COPD (chronic obstructive pulmonary disease) ([facility]     Hyperlipidemia     Hypertension     Hypothyroidism     Migraines     Open-angle glaucoma of both eyes     RLS (restless legs syndrome)     Rotator cuff disorder     right shoulder    TIA (transient ischemic attack) 11    Tobacco use disorder     Type II or unspecified type diabetes mellitus without mention of complication, not stated as uncontrolled     diet controlled     Past Surgical History:   Procedure Laterality Date    CARPAL TUNNEL RELEASE Bilateral     right and left     SECTION      x 2    CHOLECYSTECTOMY      COLONOSCOPY      COLONOSCOPY  2016    diverticulosis    CYST REMOVAL  2006    hidradenitis?     EYE SURGERY Right 16    cataract    EYE SURGERY Left 16    cataract removal    FOOT SURGERY  2018    GLAUCOMA SURGERY Bilateral     twice in right eye    HYSTERECTOMY      total    KNEE SURGERY Right 2018    OTHER SURGICAL HISTORY  2015    left oopherectomy    ROTATOR CUFF REPAIR Right  and Brinda 1 2013    x2    THROAT SURGERY  2018    mass revmoved vocal cord     UPPER GASTROINTESTINAL ENDOSCOPY  2016    small bowel bx    URETHRA SURGERY  2018     Current Outpatient Medications   Medication Sig Dispense Refill    omeprazole (PRILOSEC) 40 MG delayed release capsule TAKE 1 CAPSULE [address], [address] [place] [address]  [phone]

## 2019-04-10 ENCOUNTER — ANESTHESIA (OUTPATIENT)
Dept: OPERATING ROOM | Age: 59
End: 2019-04-10
Payer: COMMERCIAL

## 2019-04-10 ENCOUNTER — HOSPITAL ENCOUNTER (OUTPATIENT)
Age: 59
Setting detail: OBSERVATION
Discharge: HOME | End: 2019-04-11
Attending: UROLOGY | Admitting: UROLOGY
Payer: COMMERCIAL

## 2019-04-10 VITALS — TEMPERATURE: 97 F | OXYGEN SATURATION: 94 % | SYSTOLIC BLOOD PRESSURE: 98 MMHG | DIASTOLIC BLOOD PRESSURE: 78 MMHG

## 2019-04-10 DIAGNOSIS — N39.3 STRESS INCONTINENCE OF URINE: Primary | ICD-10-CM

## 2019-04-10 LAB
ANION GAP SERPL CALCULATED.3IONS-SCNC: 12 MMOL/L (ref 3–16)
BUN BLDV-MCNC: 13 MG/DL (ref 7–20)
CALCIUM SERPL-MCNC: 10 MG/DL (ref 8.3–10.6)
CHLORIDE BLD-SCNC: 101 MMOL/L (ref 99–110)
CO2: 22 MMOL/L (ref 21–32)
CREAT SERPL-MCNC: 0.7 MG/DL (ref 0.6–1.1)
GFR AFRICAN AMERICAN: >60
GFR NON-AFRICAN AMERICAN: >60
GLUCOSE BLD-MCNC: 108 MG/DL (ref 70–99)
GLUCOSE BLD-MCNC: 109 MG/DL (ref 70–99)
GLUCOSE BLD-MCNC: 110 MG/DL (ref 70–99)
GLUCOSE BLD-MCNC: 121 MG/DL (ref 70–99)
PERFORMED ON: ABNORMAL
POTASSIUM SERPL-SCNC: 4.4 MMOL/L (ref 3.5–5.1)
SODIUM BLD-SCNC: 135 MMOL/L (ref 136–145)

## 2019-04-10 PROCEDURE — 2580000003 HC RX 258: Performed by: UROLOGY

## 2019-04-10 PROCEDURE — 6360000002 HC RX W HCPCS: Performed by: UROLOGY

## 2019-04-10 PROCEDURE — 6360000002 HC RX W HCPCS: Performed by: NURSE ANESTHETIST, CERTIFIED REGISTERED

## 2019-04-10 PROCEDURE — 2709999900 HC NON-CHARGEABLE SUPPLY: Performed by: UROLOGY

## 2019-04-10 PROCEDURE — 6360000002 HC RX W HCPCS: Performed by: ANESTHESIOLOGY

## 2019-04-10 PROCEDURE — 7100000000 HC PACU RECOVERY - FIRST 15 MIN: Performed by: UROLOGY

## 2019-04-10 PROCEDURE — 3700000001 HC ADD 15 MINUTES (ANESTHESIA): Performed by: UROLOGY

## 2019-04-10 PROCEDURE — 2500000003 HC RX 250 WO HCPCS: Performed by: NURSE ANESTHETIST, CERTIFIED REGISTERED

## 2019-04-10 PROCEDURE — G0378 HOSPITAL OBSERVATION PER HR: HCPCS

## 2019-04-10 PROCEDURE — 36415 COLL VENOUS BLD VENIPUNCTURE: CPT

## 2019-04-10 PROCEDURE — 6370000000 HC RX 637 (ALT 250 FOR IP): Performed by: UROLOGY

## 2019-04-10 PROCEDURE — C1771 REP DEV, URINARY, W/SLING: HCPCS | Performed by: UROLOGY

## 2019-04-10 PROCEDURE — 80048 BASIC METABOLIC PNL TOTAL CA: CPT

## 2019-04-10 PROCEDURE — 2500000003 HC RX 250 WO HCPCS: Performed by: ANESTHESIOLOGY

## 2019-04-10 PROCEDURE — 93005 ELECTROCARDIOGRAM TRACING: CPT | Performed by: ANESTHESIOLOGY

## 2019-04-10 PROCEDURE — 2580000003 HC RX 258: Performed by: ANESTHESIOLOGY

## 2019-04-10 PROCEDURE — 3700000000 HC ANESTHESIA ATTENDED CARE: Performed by: UROLOGY

## 2019-04-10 PROCEDURE — 3600000004 HC SURGERY LEVEL 4 BASE: Performed by: UROLOGY

## 2019-04-10 PROCEDURE — 7100000001 HC PACU RECOVERY - ADDTL 15 MIN: Performed by: UROLOGY

## 2019-04-10 PROCEDURE — 3600000014 HC SURGERY LEVEL 4 ADDTL 15MIN: Performed by: UROLOGY

## 2019-04-10 PROCEDURE — 6370000000 HC RX 637 (ALT 250 FOR IP): Performed by: NURSE ANESTHETIST, CERTIFIED REGISTERED

## 2019-04-10 DEVICE — TRANSVAGINAL MID-URETHRAL SLING
Type: IMPLANTABLE DEVICE | Site: VAGINA | Status: FUNCTIONAL
Brand: ADVANTAGE FIT™  SYSTEM

## 2019-04-10 RX ORDER — MORPHINE SULFATE 10 MG/ML
1 INJECTION, SOLUTION INTRAMUSCULAR; INTRAVENOUS EVERY 5 MIN PRN
Status: DISCONTINUED | OUTPATIENT
Start: 2019-04-10 | End: 2019-04-10 | Stop reason: HOSPADM

## 2019-04-10 RX ORDER — OXYCODONE HYDROCHLORIDE AND ACETAMINOPHEN 5; 325 MG/1; MG/1
2 TABLET ORAL PRN
Status: DISCONTINUED | OUTPATIENT
Start: 2019-04-10 | End: 2019-04-10 | Stop reason: HOSPADM

## 2019-04-10 RX ORDER — HYDROMORPHONE HCL 110MG/55ML
0.5 PATIENT CONTROLLED ANALGESIA SYRINGE INTRAVENOUS EVERY 5 MIN PRN
Status: DISCONTINUED | OUTPATIENT
Start: 2019-04-10 | End: 2019-04-10 | Stop reason: HOSPADM

## 2019-04-10 RX ORDER — MEPERIDINE HYDROCHLORIDE 25 MG/ML
12.5 INJECTION INTRAMUSCULAR; INTRAVENOUS; SUBCUTANEOUS EVERY 5 MIN PRN
Status: DISCONTINUED | OUTPATIENT
Start: 2019-04-10 | End: 2019-04-10 | Stop reason: HOSPADM

## 2019-04-10 RX ORDER — GLYCOPYRROLATE 0.2 MG/ML
INJECTION INTRAMUSCULAR; INTRAVENOUS PRN
Status: DISCONTINUED | OUTPATIENT
Start: 2019-04-10 | End: 2019-04-10 | Stop reason: SDUPTHER

## 2019-04-10 RX ORDER — LISINOPRIL 10 MG/1
10 TABLET ORAL DAILY
Status: DISCONTINUED | OUTPATIENT
Start: 2019-04-10 | End: 2019-04-11 | Stop reason: HOSPADM

## 2019-04-10 RX ORDER — DOCUSATE SODIUM 100 MG/1
100 CAPSULE, LIQUID FILLED ORAL 2 TIMES DAILY
Status: DISCONTINUED | OUTPATIENT
Start: 2019-04-10 | End: 2019-04-11 | Stop reason: HOSPADM

## 2019-04-10 RX ORDER — SODIUM CHLORIDE 0.9 % (FLUSH) 0.9 %
10 SYRINGE (ML) INJECTION EVERY 12 HOURS SCHEDULED
Status: DISCONTINUED | OUTPATIENT
Start: 2019-04-10 | End: 2019-04-10 | Stop reason: HOSPADM

## 2019-04-10 RX ORDER — SODIUM CHLORIDE 0.9 % (FLUSH) 0.9 %
10 SYRINGE (ML) INJECTION PRN
Status: DISCONTINUED | OUTPATIENT
Start: 2019-04-10 | End: 2019-04-10 | Stop reason: HOSPADM

## 2019-04-10 RX ORDER — PRAVASTATIN SODIUM 40 MG
40 TABLET ORAL NIGHTLY
Status: DISCONTINUED | OUTPATIENT
Start: 2019-04-10 | End: 2019-04-11 | Stop reason: HOSPADM

## 2019-04-10 RX ORDER — VENLAFAXINE HYDROCHLORIDE 75 MG/1
75 CAPSULE, EXTENDED RELEASE ORAL NIGHTLY
Status: DISCONTINUED | OUTPATIENT
Start: 2019-04-10 | End: 2019-04-11 | Stop reason: HOSPADM

## 2019-04-10 RX ORDER — OXYCODONE HYDROCHLORIDE AND ACETAMINOPHEN 5; 325 MG/1; MG/1
1 TABLET ORAL EVERY 4 HOURS PRN
Status: DISCONTINUED | OUTPATIENT
Start: 2019-04-10 | End: 2019-04-11 | Stop reason: HOSPADM

## 2019-04-10 RX ORDER — LOSARTAN POTASSIUM 25 MG/1
50 TABLET ORAL DAILY
Status: DISCONTINUED | OUTPATIENT
Start: 2019-04-10 | End: 2019-04-11 | Stop reason: HOSPADM

## 2019-04-10 RX ORDER — OXYBUTYNIN CHLORIDE 5 MG/1
5 TABLET, EXTENDED RELEASE ORAL DAILY
Status: DISCONTINUED | OUTPATIENT
Start: 2019-04-10 | End: 2019-04-11 | Stop reason: HOSPADM

## 2019-04-10 RX ORDER — METHOCARBAMOL 500 MG/1
750 TABLET, FILM COATED ORAL 3 TIMES DAILY
Status: DISCONTINUED | OUTPATIENT
Start: 2019-04-10 | End: 2019-04-11 | Stop reason: HOSPADM

## 2019-04-10 RX ORDER — HYDROMORPHONE HCL 110MG/55ML
0.5 PATIENT CONTROLLED ANALGESIA SYRINGE INTRAVENOUS
Status: DISCONTINUED | OUTPATIENT
Start: 2019-04-10 | End: 2019-04-11 | Stop reason: HOSPADM

## 2019-04-10 RX ORDER — ALBUTEROL SULFATE 2.5 MG/3ML
2.5 SOLUTION RESPIRATORY (INHALATION) EVERY 4 HOURS PRN
Status: DISCONTINUED | OUTPATIENT
Start: 2019-04-10 | End: 2019-04-11

## 2019-04-10 RX ORDER — FLUTICASONE PROPIONATE 50 MCG
1 SPRAY, SUSPENSION (ML) NASAL DAILY
Status: DISCONTINUED | OUTPATIENT
Start: 2019-04-10 | End: 2019-04-11 | Stop reason: HOSPADM

## 2019-04-10 RX ORDER — ONDANSETRON 2 MG/ML
4 INJECTION INTRAMUSCULAR; INTRAVENOUS PRN
Status: DISCONTINUED | OUTPATIENT
Start: 2019-04-10 | End: 2019-04-10 | Stop reason: HOSPADM

## 2019-04-10 RX ORDER — MIDAZOLAM HYDROCHLORIDE 1 MG/ML
2 INJECTION INTRAMUSCULAR; INTRAVENOUS ONCE
Status: COMPLETED | OUTPATIENT
Start: 2019-04-10 | End: 2019-04-10

## 2019-04-10 RX ORDER — SODIUM CHLORIDE 0.9 % (FLUSH) 0.9 %
10 SYRINGE (ML) INJECTION PRN
Status: DISCONTINUED | OUTPATIENT
Start: 2019-04-10 | End: 2019-04-11 | Stop reason: HOSPADM

## 2019-04-10 RX ORDER — SODIUM CHLORIDE, SODIUM LACTATE, POTASSIUM CHLORIDE, CALCIUM CHLORIDE 600; 310; 30; 20 MG/100ML; MG/100ML; MG/100ML; MG/100ML
INJECTION, SOLUTION INTRAVENOUS CONTINUOUS
Status: DISCONTINUED | OUTPATIENT
Start: 2019-04-10 | End: 2019-04-10

## 2019-04-10 RX ORDER — OXYCODONE HYDROCHLORIDE AND ACETAMINOPHEN 5; 325 MG/1; MG/1
0.5 TABLET ORAL EVERY 4 HOURS PRN
Qty: 10 TABLET | Refills: 0 | Status: SHIPPED | OUTPATIENT
Start: 2019-04-10 | End: 2019-04-15

## 2019-04-10 RX ORDER — HYDRALAZINE HYDROCHLORIDE 20 MG/ML
5 INJECTION INTRAMUSCULAR; INTRAVENOUS EVERY 10 MIN PRN
Status: DISCONTINUED | OUTPATIENT
Start: 2019-04-10 | End: 2019-04-10 | Stop reason: HOSPADM

## 2019-04-10 RX ORDER — VENLAFAXINE HYDROCHLORIDE 75 MG/1
150 CAPSULE, EXTENDED RELEASE ORAL
Status: DISCONTINUED | OUTPATIENT
Start: 2019-04-11 | End: 2019-04-11 | Stop reason: HOSPADM

## 2019-04-10 RX ORDER — LEVOTHYROXINE SODIUM 0.1 MG/1
100 TABLET ORAL
Status: DISCONTINUED | OUTPATIENT
Start: 2019-04-11 | End: 2019-04-11 | Stop reason: HOSPADM

## 2019-04-10 RX ORDER — IPRATROPIUM BROMIDE AND ALBUTEROL SULFATE 2.5; .5 MG/3ML; MG/3ML
3 SOLUTION RESPIRATORY (INHALATION) EVERY 4 HOURS PRN
Status: DISCONTINUED | OUTPATIENT
Start: 2019-04-10 | End: 2019-04-11

## 2019-04-10 RX ORDER — BLOOD-GLUCOSE METER
1 KIT MISCELLANEOUS DAILY
Status: DISCONTINUED | OUTPATIENT
Start: 2019-04-10 | End: 2019-04-10 | Stop reason: ALTCHOICE

## 2019-04-10 RX ORDER — VENLAFAXINE HYDROCHLORIDE 75 MG/1
150 CAPSULE, EXTENDED RELEASE ORAL SEE ADMIN INSTRUCTIONS
Status: DISCONTINUED | OUTPATIENT
Start: 2019-04-10 | End: 2019-04-10 | Stop reason: SDUPTHER

## 2019-04-10 RX ORDER — ROCURONIUM BROMIDE 10 MG/ML
INJECTION, SOLUTION INTRAVENOUS PRN
Status: DISCONTINUED | OUTPATIENT
Start: 2019-04-10 | End: 2019-04-10 | Stop reason: SDUPTHER

## 2019-04-10 RX ORDER — DIAZEPAM 5 MG/1
5 TABLET ORAL DAILY PRN
Status: DISCONTINUED | OUTPATIENT
Start: 2019-04-10 | End: 2019-04-11 | Stop reason: HOSPADM

## 2019-04-10 RX ORDER — SODIUM CHLORIDE 0.9 % (FLUSH) 0.9 %
10 SYRINGE (ML) INJECTION EVERY 12 HOURS SCHEDULED
Status: DISCONTINUED | OUTPATIENT
Start: 2019-04-10 | End: 2019-04-11 | Stop reason: HOSPADM

## 2019-04-10 RX ORDER — LIDOCAINE HYDROCHLORIDE 20 MG/ML
INJECTION, SOLUTION INFILTRATION; PERINEURAL PRN
Status: DISCONTINUED | OUTPATIENT
Start: 2019-04-10 | End: 2019-04-10 | Stop reason: SDUPTHER

## 2019-04-10 RX ORDER — LAMOTRIGINE 100 MG/1
100 TABLET ORAL 2 TIMES DAILY
Status: DISCONTINUED | OUTPATIENT
Start: 2019-04-10 | End: 2019-04-11 | Stop reason: HOSPADM

## 2019-04-10 RX ORDER — ALBUTEROL SULFATE 90 UG/1
2 AEROSOL, METERED RESPIRATORY (INHALATION) EVERY 6 HOURS PRN
Status: DISCONTINUED | OUTPATIENT
Start: 2019-04-10 | End: 2019-04-11

## 2019-04-10 RX ORDER — SODIUM CHLORIDE, SODIUM LACTATE, POTASSIUM CHLORIDE, CALCIUM CHLORIDE 600; 310; 30; 20 MG/100ML; MG/100ML; MG/100ML; MG/100ML
INJECTION, SOLUTION INTRAVENOUS CONTINUOUS
Status: DISCONTINUED | OUTPATIENT
Start: 2019-04-10 | End: 2019-04-11 | Stop reason: HOSPADM

## 2019-04-10 RX ORDER — ONDANSETRON 2 MG/ML
4 INJECTION INTRAMUSCULAR; INTRAVENOUS EVERY 6 HOURS PRN
Status: DISCONTINUED | OUTPATIENT
Start: 2019-04-10 | End: 2019-04-11 | Stop reason: HOSPADM

## 2019-04-10 RX ORDER — AMLODIPINE BESYLATE 5 MG/1
5 TABLET ORAL DAILY
Status: DISCONTINUED | OUTPATIENT
Start: 2019-04-11 | End: 2019-04-11 | Stop reason: HOSPADM

## 2019-04-10 RX ORDER — LIDOCAINE HYDROCHLORIDE 40 MG/ML
SOLUTION TOPICAL PRN
Status: DISCONTINUED | OUTPATIENT
Start: 2019-04-10 | End: 2019-04-10 | Stop reason: SDUPTHER

## 2019-04-10 RX ORDER — CIPROFLOXACIN 500 MG/1
500 TABLET, FILM COATED ORAL 2 TIMES DAILY
Qty: 10 TABLET | Refills: 0 | Status: SHIPPED | OUTPATIENT
Start: 2019-04-10 | End: 2019-04-15

## 2019-04-10 RX ORDER — DIPHENHYDRAMINE HYDROCHLORIDE 50 MG/ML
12.5 INJECTION INTRAMUSCULAR; INTRAVENOUS
Status: DISCONTINUED | OUTPATIENT
Start: 2019-04-10 | End: 2019-04-10 | Stop reason: HOSPADM

## 2019-04-10 RX ORDER — PREDNISONE 10 MG/1
10 TABLET ORAL DAILY
Status: DISCONTINUED | OUTPATIENT
Start: 2019-04-10 | End: 2019-04-11 | Stop reason: HOSPADM

## 2019-04-10 RX ORDER — MIDAZOLAM HYDROCHLORIDE 1 MG/ML
INJECTION INTRAMUSCULAR; INTRAVENOUS
Status: DISPENSED
Start: 2019-04-10 | End: 2019-04-11

## 2019-04-10 RX ORDER — TRAZODONE HYDROCHLORIDE 50 MG/1
50 TABLET ORAL NIGHTLY PRN
Status: DISCONTINUED | OUTPATIENT
Start: 2019-04-10 | End: 2019-04-11 | Stop reason: HOSPADM

## 2019-04-10 RX ORDER — FENTANYL CITRATE 50 UG/ML
INJECTION, SOLUTION INTRAMUSCULAR; INTRAVENOUS PRN
Status: DISCONTINUED | OUTPATIENT
Start: 2019-04-10 | End: 2019-04-10 | Stop reason: SDUPTHER

## 2019-04-10 RX ORDER — LABETALOL HYDROCHLORIDE 5 MG/ML
5 INJECTION, SOLUTION INTRAVENOUS EVERY 10 MIN PRN
Status: DISCONTINUED | OUTPATIENT
Start: 2019-04-10 | End: 2019-04-10 | Stop reason: HOSPADM

## 2019-04-10 RX ORDER — BRIMONIDINE TARTRATE 2 MG/ML
1 SOLUTION/ DROPS OPHTHALMIC 2 TIMES DAILY
Status: DISCONTINUED | OUTPATIENT
Start: 2019-04-10 | End: 2019-04-11 | Stop reason: HOSPADM

## 2019-04-10 RX ORDER — LIDOCAINE HYDROCHLORIDE 10 MG/ML
1 INJECTION, SOLUTION EPIDURAL; INFILTRATION; INTRACAUDAL; PERINEURAL
Status: COMPLETED | OUTPATIENT
Start: 2019-04-10 | End: 2019-04-10

## 2019-04-10 RX ORDER — PROMETHAZINE HYDROCHLORIDE 25 MG/ML
6.25 INJECTION, SOLUTION INTRAMUSCULAR; INTRAVENOUS
Status: DISCONTINUED | OUTPATIENT
Start: 2019-04-10 | End: 2019-04-10 | Stop reason: HOSPADM

## 2019-04-10 RX ORDER — HYDROMORPHONE HCL 110MG/55ML
0.25 PATIENT CONTROLLED ANALGESIA SYRINGE INTRAVENOUS EVERY 5 MIN PRN
Status: DISCONTINUED | OUTPATIENT
Start: 2019-04-10 | End: 2019-04-10 | Stop reason: HOSPADM

## 2019-04-10 RX ORDER — PANTOPRAZOLE SODIUM 40 MG/1
40 TABLET, DELAYED RELEASE ORAL
Status: DISCONTINUED | OUTPATIENT
Start: 2019-04-11 | End: 2019-04-11 | Stop reason: HOSPADM

## 2019-04-10 RX ORDER — PROPOFOL 10 MG/ML
INJECTION, EMULSION INTRAVENOUS PRN
Status: DISCONTINUED | OUTPATIENT
Start: 2019-04-10 | End: 2019-04-10 | Stop reason: SDUPTHER

## 2019-04-10 RX ORDER — ONDANSETRON 2 MG/ML
INJECTION INTRAMUSCULAR; INTRAVENOUS PRN
Status: DISCONTINUED | OUTPATIENT
Start: 2019-04-10 | End: 2019-04-10 | Stop reason: SDUPTHER

## 2019-04-10 RX ORDER — OXYCODONE HYDROCHLORIDE AND ACETAMINOPHEN 5; 325 MG/1; MG/1
1 TABLET ORAL PRN
Status: DISCONTINUED | OUTPATIENT
Start: 2019-04-10 | End: 2019-04-10 | Stop reason: HOSPADM

## 2019-04-10 RX ORDER — MORPHINE SULFATE 10 MG/ML
2 INJECTION, SOLUTION INTRAMUSCULAR; INTRAVENOUS EVERY 5 MIN PRN
Status: DISCONTINUED | OUTPATIENT
Start: 2019-04-10 | End: 2019-04-10 | Stop reason: HOSPADM

## 2019-04-10 RX ADMIN — ONDANSETRON 4 MG: 2 INJECTION, SOLUTION INTRAMUSCULAR; INTRAVENOUS at 17:16

## 2019-04-10 RX ADMIN — BRIMONIDINE TARTRATE 1 DROP: 2 SOLUTION OPHTHALMIC at 21:36

## 2019-04-10 RX ADMIN — Medication 10 ML: at 20:28

## 2019-04-10 RX ADMIN — HYDROMORPHONE HYDROCHLORIDE 0.5 MG: 2 INJECTION, SOLUTION INTRAMUSCULAR; INTRAVENOUS; SUBCUTANEOUS at 18:40

## 2019-04-10 RX ADMIN — FENTANYL CITRATE 100 MCG: 50 INJECTION INTRAMUSCULAR; INTRAVENOUS at 17:23

## 2019-04-10 RX ADMIN — SODIUM CHLORIDE, POTASSIUM CHLORIDE, SODIUM LACTATE AND CALCIUM CHLORIDE: 600; 310; 30; 20 INJECTION, SOLUTION INTRAVENOUS at 15:05

## 2019-04-10 RX ADMIN — LAMOTRIGINE 100 MG: 100 TABLET ORAL at 20:25

## 2019-04-10 RX ADMIN — MIDAZOLAM HYDROCHLORIDE 2 MG: 2 INJECTION, SOLUTION INTRAMUSCULAR; INTRAVENOUS at 15:37

## 2019-04-10 RX ADMIN — HYDROMORPHONE HYDROCHLORIDE 0.5 MG: 2 INJECTION, SOLUTION INTRAMUSCULAR; INTRAVENOUS; SUBCUTANEOUS at 18:59

## 2019-04-10 RX ADMIN — SODIUM CHLORIDE, POTASSIUM CHLORIDE, SODIUM LACTATE AND CALCIUM CHLORIDE: 600; 310; 30; 20 INJECTION, SOLUTION INTRAVENOUS at 17:31

## 2019-04-10 RX ADMIN — DOCUSATE SODIUM 100 MG: 100 CAPSULE, LIQUID FILLED ORAL at 20:24

## 2019-04-10 RX ADMIN — OXYCODONE AND ACETAMINOPHEN 1 TABLET: 5; 325 TABLET ORAL at 20:25

## 2019-04-10 RX ADMIN — LIDOCAINE HYDROCHLORIDE 60 MG: 20 INJECTION, SOLUTION INFILTRATION; PERINEURAL at 16:58

## 2019-04-10 RX ADMIN — ROCURONIUM BROMIDE 10 MG: 10 INJECTION, SOLUTION INTRAVENOUS at 17:23

## 2019-04-10 RX ADMIN — SUGAMMADEX 200 MG: 100 INJECTION, SOLUTION INTRAVENOUS at 17:50

## 2019-04-10 RX ADMIN — LIDOCAINE HYDROCHLORIDE 4 ML: 40 SPRAY LARYNGEAL; TRANSTRACHEAL at 17:00

## 2019-04-10 RX ADMIN — GLYCOPYRROLATE 0.2 MG: 0.2 INJECTION, SOLUTION INTRAMUSCULAR; INTRAVENOUS at 17:18

## 2019-04-10 RX ADMIN — FENTANYL CITRATE 50 MCG: 50 INJECTION INTRAMUSCULAR; INTRAVENOUS at 16:58

## 2019-04-10 RX ADMIN — Medication 2 G: at 16:44

## 2019-04-10 RX ADMIN — LIDOCAINE HYDROCHLORIDE 0.2 ML: 10 INJECTION, SOLUTION EPIDURAL; INFILTRATION; INTRACAUDAL; PERINEURAL at 15:05

## 2019-04-10 RX ADMIN — SODIUM CHLORIDE, POTASSIUM CHLORIDE, SODIUM LACTATE AND CALCIUM CHLORIDE: 600; 310; 30; 20 INJECTION, SOLUTION INTRAVENOUS at 16:53

## 2019-04-10 RX ADMIN — ROCURONIUM BROMIDE 50 MG: 10 INJECTION, SOLUTION INTRAVENOUS at 16:58

## 2019-04-10 RX ADMIN — SODIUM CHLORIDE, POTASSIUM CHLORIDE, SODIUM LACTATE AND CALCIUM CHLORIDE: 600; 310; 30; 20 INJECTION, SOLUTION INTRAVENOUS at 20:17

## 2019-04-10 RX ADMIN — HYDROMORPHONE HYDROCHLORIDE 0.5 MG: 2 INJECTION, SOLUTION INTRAMUSCULAR; INTRAVENOUS; SUBCUTANEOUS at 18:24

## 2019-04-10 RX ADMIN — PROPOFOL 200 MG: 10 INJECTION, EMULSION INTRAVENOUS at 16:58

## 2019-04-10 RX ADMIN — FENTANYL CITRATE 50 MCG: 50 INJECTION INTRAMUSCULAR; INTRAVENOUS at 16:53

## 2019-04-10 ASSESSMENT — PULMONARY FUNCTION TESTS
PIF_VALUE: 24
PIF_VALUE: 7
PIF_VALUE: 25
PIF_VALUE: 1
PIF_VALUE: 7
PIF_VALUE: 25
PIF_VALUE: 1
PIF_VALUE: 27
PIF_VALUE: 24
PIF_VALUE: 23
PIF_VALUE: 24
PIF_VALUE: 25
PIF_VALUE: 22
PIF_VALUE: 1
PIF_VALUE: 25
PIF_VALUE: 6
PIF_VALUE: 24
PIF_VALUE: 6
PIF_VALUE: 24
PIF_VALUE: 25
PIF_VALUE: 6
PIF_VALUE: 38
PIF_VALUE: 27
PIF_VALUE: 22
PIF_VALUE: 24
PIF_VALUE: 25
PIF_VALUE: 34
PIF_VALUE: 16
PIF_VALUE: 22
PIF_VALUE: 22
PIF_VALUE: 24
PIF_VALUE: 24
PIF_VALUE: 25
PIF_VALUE: 18
PIF_VALUE: 24
PIF_VALUE: 24
PIF_VALUE: 25
PIF_VALUE: 24
PIF_VALUE: 20
PIF_VALUE: 24
PIF_VALUE: 24
PIF_VALUE: 22
PIF_VALUE: 18
PIF_VALUE: 24
PIF_VALUE: 22
PIF_VALUE: 24
PIF_VALUE: 22
PIF_VALUE: 20
PIF_VALUE: 24
PIF_VALUE: 24
PIF_VALUE: 23
PIF_VALUE: 24
PIF_VALUE: 24
PIF_VALUE: 7
PIF_VALUE: 24
PIF_VALUE: 20
PIF_VALUE: 2
PIF_VALUE: 25

## 2019-04-10 ASSESSMENT — PAIN SCALES - GENERAL
PAINLEVEL_OUTOF10: 8
PAINLEVEL_OUTOF10: 8
PAINLEVEL_OUTOF10: 2
PAINLEVEL_OUTOF10: 8
PAINLEVEL_OUTOF10: 8

## 2019-04-10 ASSESSMENT — PAIN - FUNCTIONAL ASSESSMENT: PAIN_FUNCTIONAL_ASSESSMENT: 0-10

## 2019-04-10 ASSESSMENT — ENCOUNTER SYMPTOMS: SHORTNESS OF BREATH: 1

## 2019-04-10 NOTE — BRIEF OP NOTE
Brief Postoperative Note  ______________________________________________________________    Patient: Barb Merino  YOB: 1960  MRN: [id number]  Date of Procedure: 4/10/2019    Pre-Op Diagnosis: MIXED INCONTINENCE    Post-Op Diagnosis: Same       Procedure(s):  CYSTOSCOPY, TRANSVAGINAL TAPING, CYSTOCELE REPAIR    Anesthesia: General    Surgeon(s):  Rob Marlow MD    Assistant:     Estimated Blood Loss (mL): 587     Complications: None    Specimens:   * No specimens in log *    Implants:  Implant Name Type Inv.  Item Serial No.  Lot No. LRB No. Used   IMPL SLING TRANSVAGINAL MID URETH ADV FIT SYS Urological/Gyn IMPL SLING TRANSVAGINAL MID URETH ADV FIT SYS  [place] SCI: INTERVENTIONAL CARDIO [id number] N/A 1         Drains:   Urethral Catheter Non-latex 16 fr (Active)       Findings: Prolapse with scar tissue    Eduarda Brito MD  Date: 4/10/2019  Time: 6:01 PM

## 2019-04-10 NOTE — ANESTHESIA PRE PROCEDURE
Department of Anesthesiology  Preprocedure Note       Name:  Sincere Sánchez   Age:  66 y.o.  :  1960                                          MRN:  [id number]         Date:  4/10/2019      Surgeon: Romero Piña):  Ruddy Perez MD    Procedure: CYSTOSCOPY, TVT TRANS VAGINAL TAPING WITH [employer] ADVANTAGE KIT, CYSTOCELE REPAIR-NO MESH (N/A Vagina )    Medications prior to admission:   Prior to Admission medications    Medication Sig Start Date End Date Taking?  Authorizing Provider   omeprazole (PRILOSEC) 40 MG delayed release capsule TAKE 1 CAPSULE BY MOUTH TWICE DAILY 19  Yes Rocio Gomez MD   pravastatin (PRAVACHOL) 40 MG tablet TAKE 1 TABLET BY MOUTH EVERY EVENING 19  Yes Rocio Gomez MD   BREO ELLIPTA 100-25 MCG/INH AEPB inhaler INHALE 1 PUFF INTO THE LUNGS DAILY 3/1/19  Yes Stefanie Alvarado MD   oxybutynin (DITROPAN-XL) 5 MG extended release tablet TAKE 1 TABLET BY MOUTH EVERY DAY 19  Yes VERONIKA Cain - CNP   rOPINIRole (REQUIP) 2 MG tablet 2 mg at bedtime and 2 mg in evening as needed prior to bedtime 19  Yes Stefanie Alvarado MD   predniSONE (DELTASONE) 10 MG tablet 3 tabs daily for 5 days 19  Yes Stefanie Alvarado MD   levothyroxine (SYNTHROID) 100 MCG tablet TAKE ONE TABLET BY MOUTH EVERY DAY 18  Yes Rocio Gomez MD   VENTOLIN  (90 Base) MCG/ACT inhaler INHALE 2 PUFFS INTO THE LUNGS EVERY 4 HOURS AS NEEDED FOR WHEEZING 18  Yes Stefanie Alvarado MD   losartan (COZAAR) 50 MG tablet TAKE 1 TABLET BY MOUTH EVERY DAY 18  Yes Rocio Gomez MD   methocarbamol (ROBAXIN) 750 MG tablet  18  Yes Historical Provider, MD   lithium 150 MG capsule Take 450 mg by mouth nightly  18  Yes Historical Provider, MD   ipratropium-albuterol (DUONEB) 0.5-2.5 (3) MG/3ML SOLN nebulizer solution Inhale 3 mLs into the lungs   Yes Historical Provider, MD   albuterol (PROVENTIL) (2.5 MG/3ML) 0.083% nebulizer solution Take 3 mLs by nebulization every 4 hours as needed for Wheezing 3/7/18 4/9/19 Yes Eugene Luciano MD   lisinopril (PRINIVIL;ZESTRIL) 10 MG tablet Take 10 mg by mouth daily   Yes Historical Provider, MD   HYDROcodone-acetaminophen (Lenor Sai) 5-325 MG per tablet  17  Yes Historical Provider, MD   venlafaxine (EFFEXOR XR) 150 MG extended release capsule  17  Yes Historical Provider, MD   amLODIPine (NORVASC) 5 MG tablet Take 1 tablet by mouth daily 17  Yes Jared Spencer MD   diazepam (VALIUM) 5 MG tablet Take 5 mg by mouth daily as needed for Anxiety   Yes Historical Provider, MD   fluticasone (FLONASE) 50 MCG/ACT nasal spray 1 spray by Nasal route daily 16  Yes Christian Jones MD   ESTROGENS CONJUGATED PO Take by mouth   Yes Historical Provider, MD   traZODone (DESYREL) 50 MG tablet Take 50 mg by mouth nightly as needed    Yes Historical Provider, MD   venlafaxine (EFFEXOR-XR) 75 MG XR capsule   Take 150 mg by mouth See Admin Instructions 150mg in morning, 75mg at night   Yes Historical Provider, MD   lamotrigine (LAMICTAL) 100 MG tablet Take 100 mg by mouth 2 times daily. Yes Historical Provider, MD   timolol (BETIMOL) 0.5 % ophthalmic solution 1 drop 2 times daily. Yes Historical Provider, MD   brimonidine (ALPHAGAN P) 0.15 % ophthalmic solution 1 drop 2 times daily. 5/14/10  Yes Historical Provider, MD   Blood Glucose Monitoring Suppl (FREESTYLE LITE) CONNIE 1 Device by Does not apply route daily 19   VERONIKA Menchaca CNP   blood glucose test strips (FREESTYLE LITE) strip 1 each by In Vitro route daily As needed.  19   VERONIKA Menchaca CNP   Hydrogen Peroxide 1.5 % SOLN Take 5 mLs by mouth 4 times daily Swish 5 mls by mouth and spit 4 times a day 19   VERONIKA Menchaca CNP   Handicap Placard [facility] Da Ricketts,  1960, qualifies for a handicap placard renewal.   This should be valid from 3/2019 until 3/2024. 19   Evangelista Zheng, APRN - CNP   nicotine polacrilex (NICORETTE) 2 MG gum Take 1 each by mouth as needed for Smoking cessation 7/19/18   Stefanie Alvarado MD   diclofenac sodium 1 % GEL  12/1/17   Historical Provider, MD       Current medications:    Current Facility-Administered Medications   Medication Dose Route Frequency Provider Last Rate Last Dose    ceFAZolin (ANCEF) 2 g in sterile water 20 mL IV syringe  2 g Intravenous Once Ruddy Perez MD        lactated ringers infusion   Intravenous Continuous Erika Fragoso  mL/hr at 04/10/19 1505      sodium chloride flush 0.9 % injection 10 mL  10 mL Intravenous 2 times per day Erika Fragoso MD        sodium chloride flush 0.9 % injection 10 mL  10 mL Intravenous PRN Erika Fragoso MD           Allergies:     Allergies   Allergen Reactions    Budesonide Anaphylaxis and Swelling     Throat swelled    Tiotropium Bromide Monohydrate Anaphylaxis and Rash    Albuterol Swelling    Cymbalta [Duloxetine Hcl] Other (See Comments) and Swelling     Throat swelled  suicidal  Throat swelling    Maxalt [Rizatriptan Benzoate]      Does not recall    Meloxicam Other (See Comments)     Unsure of Rx  crazy    Risperidone      Unsure of Rx    Rizatriptan Other (See Comments)     Pt unsure    Spiriva [Tiotropium Bromide Monohydrate] Swelling     Neck swelling    Topamax      \"didn't work\"    Topiramate Other (See Comments) and Swelling     unsure    Buspirone Nausea And Vomiting       Problem List:    Patient Active Problem List   Diagnosis Code    TIA (transient ischemic attack) G45.9    Acquired hypothyroidism E03.9    Dyspnea R06.00    Tobacco abuse Z72.0    Abnormal PFT R94.2    Centrilobular emphysema (HCC) J43.2    Excessive sleepiness G47.10    Abnormal finding on Pap smear, HPV DNA positive [id number]    Restless leg syndrome G25.81    Leg pain M79.606    Diabetic polyneuropathy ([facility] E11.42    Uncontrolled type 2 diabetes mellitus with hyperosmolarity without coma, without long-term current use of insulin ([facility]) E11.00    Restless legs syndrome (RLS) G25.81    Low grade squamous intraepith lesion on cytologic smear vagina (lgsil) R87.622    Ovarian cyst, left N83.202    Fracture of coccyx ([facility] S32. 2XXA    Fracture of spinal vertebra [id number]    Hip pain M25.559    Cataract H26.9    Glaucoma H40.9    Viral upper respiratory tract infection J06.9    Migraine with aura and without status migrainosus, not intractable G43. 109    Essential hypertension I10    Moderate episode of recurrent major depressive disorder (HCC) F33.1    Bilateral carotid artery disease ([facility]) I77.9    Neck pain of over 3 months duration M54.2, G89.29    New persistent daily headache G44.52    Cervical disc disorder of mid-cervical region M50.920    Headache, cervicogenic R51    HTN (hypertension), benign I10    Dysthymia F34.1    Uncontrolled type 2 diabetes mellitus with complication, without long-term current use of insulin ([facility]) E11.8, E11.65    Dyslipidemia E78.5       Past Medical History:        Diagnosis Date    Anxiety     Bipolar disorder ([facility]     Brain aneurysm     Zucarello    Closed angle glaucoma     COPD (chronic obstructive pulmonary disease) ([facility]     Hyperlipidemia     Hypertension     Hypothyroidism     Migraines     Open-angle glaucoma of both eyes     RLS (restless legs syndrome)     Rotator cuff disorder     right shoulder    TIA (transient ischemic attack) 11    Tobacco use disorder     Type II or unspecified type diabetes mellitus without mention of complication, not stated as uncontrolled     diet controlled       Past Surgical History:        Procedure Laterality Date    CARPAL TUNNEL RELEASE Bilateral     right and left     SECTION      x 2    CHOLECYSTECTOMY      COLONOSCOPY      COLONOSCOPY  2016    diverticulosis    CYST REMOVAL  2006    hidradenitis?     EYE SURGERY Right 16    cataract    EYE SURGERY Left 16    cataract removal    FOOT SURGERY  2018    GLAUCOMA SURGERY Bilateral     twice in right eye    HYSTERECTOMY  2002/2015    total    KNEE SURGERY Right 11/30/2018    OTHER SURGICAL HISTORY  09/2015    left oopherectomy    ROTATOR CUFF REPAIR Right 2008 and Brinda 1 2013    x2    THROAT SURGERY  06/2018    mass revmoved vocal cord     UPPER GASTROINTESTINAL ENDOSCOPY  09/12/2016    small bowel bx    URETHRA SURGERY  06/28/2018       Social History:    Social History     Tobacco Use    Smoking status: Current Every Day Smoker     Packs/day: 1.00     Years: 38.00     Pack years: 38.00     Types: Cigarettes     Start date: 1/1/1978    Smokeless tobacco: Never Used   Substance Use Topics    Alcohol use: No     Alcohol/week: 0.0 oz                                Ready to quit: Not Answered  Counseling given: Not Answered      Vital Signs (Current):   Vitals:    04/08/19 1420 04/10/19 1451 04/10/19 1455   BP:  122/62 125/64   Pulse:  74    Resp:  16    Temp:  98.1 °F (36.7 °C)    TempSrc:  Temporal    SpO2:  95%    Weight: 170 lb (77.1 kg)     Height: 5' (1.524 m)                                                BP Readings from Last 3 Encounters:   04/10/19 125/64   04/09/19 116/60   02/26/19 (!) 148/64       NPO Status: Time of last liquid consumption: 0000                        Time of last solid consumption: 0000                        Date of last liquid consumption: 04/10/19                        Date of last solid food consumption: 04/10/19    BMI:   Wt Readings from Last 3 Encounters:   04/08/19 170 lb (77.1 kg)   04/09/19 158 lb (71.7 kg)   02/26/19 162 lb 6.4 oz (73.7 kg)     Body mass index is 33.2 kg/m².     CBC:   Lab Results   Component Value Date    WBC 12.0 12/17/2018    RBC 4.05 12/17/2018    HGB 11.1 12/17/2018    HCT 34.7 12/17/2018    MCV 85.6 12/17/2018    RDW 16.6 12/17/2018     12/17/2018       CMP:   Lab Results   Component Value Date     04/10/2019    K 4.4 04/10/2019     04/10/2019    CO2 22 04/10/2019    BUN 13 04/10/2019    CREATININE 0.7 04/10/2019    GFRAA >60 04/10/2019    GFRAA >60 04/24/2013    AGRATIO 1.4 12/17/2018    LABGLOM >60 04/10/2019    GLUCOSE 110 04/10/2019    PROT 7.5 12/17/2018    PROT 7.0 01/11/2013    CALCIUM 10.0 04/10/2019    BILITOT <0.2 12/17/2018    ALKPHOS 101 12/17/2018    AST 13 12/17/2018    ALT 12 12/17/2018       POC Tests: No results for input(s): POCGLU, POCNA, POCK, POCCL, POCBUN, POCHEMO, POCHCT in the last 72 hours. Coags:   Lab Results   Component Value Date    PROTIME 11.7 03/12/2017    INR 1.04 03/12/2017    APTT 27.3 03/12/2017       HCG (If Applicable): No results found for: PREGTESTUR, PREGSERUM, HCG, HCGQUANT     ABGs: No results found for: PHART, PO2ART, XCH0DKD, HUM2PTS, BEART, V1YWTHRU     Type & Screen (If Applicable):  Lab Results   Component Value Date    LABABO A 08/08/2015    LABABO A 08/08/2015       Anesthesia Evaluation  Patient summary reviewed and Nursing notes reviewed  Airway: Mallampati: III  TM distance: <3 FB   Neck ROM: limited  Mouth opening: > = 3 FB Dental:    (+) edentulous      Pulmonary:   (+) COPD:  shortness of breath:  decreased breath sounds,                             Cardiovascular:    (+) hypertension: mild and moderate,         Rhythm: regular  Rate: normal                    Neuro/Psych:   (+) neuromuscular disease:, TIA, depression/anxiety             GI/Hepatic/Renal: Neg GI/Hepatic/Renal ROS            Endo/Other:    (+) DiabetesType II DM, , hypothyroidism::., .                 Abdominal:   (+) obese,         Vascular:   + PVD, aortic or cerebral, . ROS comment: Aneurysm  . Anesthesia Plan      general     ASA 3       Induction: intravenous. MIPS: Postoperative opioids intended and Prophylactic antiemetics administered. Anesthetic plan and risks discussed with patient. Plan discussed with CRNA.                   Judge Tesfaye MD   4/10/2019

## 2019-04-10 NOTE — PROGRESS NOTES
4 Eyes Skin Assessment   Four eyes skin assessment completed at this time by Angelica Roland RN   and Bita Haley. Assessment revealed: Intact skin , surgical scar on right foot  The patient is being assessed for  Admission    I agree that 2 RN's have performed a thorough Head to Toe Skin Assessment on the patient. ALL assessment sites listed below have been assessed.        Areas assessed by both nurses:  [x]   Head, Face, and Ears   [x]   Shoulders, Back, and Chest  [x]   Arms, Elbows, and Hands   [x]   Coccyx, Sacrum, and Ischum  [x]   Legs, Feet, and Heels              Angelica Roland RN

## 2019-04-10 NOTE — PROGRESS NOTES
4 Eyes Skin Assessment   Four eyes skin assessment completed at this time by Lore Scott RN   and Yanna Damon RN. Assessment revealed: Intact skin. Vaginal packing in place with jessica. Surgical scar right foot. Dry skin bilat feet    The patient is being assessed for  Transfer to New Unit    I agree that 2 RN's have performed a thorough Head to Toe Skin Assessment on the patient. ALL assessment sites listed below have been assessed.        Areas assessed by both nurses:  [x]   Head, Face, and Ears   [x]   Shoulders, Back, and Chest  [x]   Arms, Elbows, and Hands   [x]   Coccyx, Sacrum, and Ischum  [x]   Legs, Feet, and Heels              Marcelino Villagomez RN   eyes

## 2019-04-11 VITALS
BODY MASS INDEX: 33.38 KG/M2 | WEIGHT: 170 LBS | HEART RATE: 67 BPM | HEIGHT: 60 IN | RESPIRATION RATE: 16 BRPM | OXYGEN SATURATION: 95 % | SYSTOLIC BLOOD PRESSURE: 106 MMHG | TEMPERATURE: 97.2 F | DIASTOLIC BLOOD PRESSURE: 55 MMHG

## 2019-04-11 LAB
EKG ATRIAL RATE: 73 BPM
EKG DIAGNOSIS: NORMAL
EKG P AXIS: 66 DEGREES
EKG P-R INTERVAL: 152 MS
EKG Q-T INTERVAL: 414 MS
EKG QRS DURATION: 84 MS
EKG QTC CALCULATION (BAZETT): 456 MS
EKG R AXIS: 59 DEGREES
EKG T AXIS: 62 DEGREES
EKG VENTRICULAR RATE: 73 BPM
GLUCOSE BLD-MCNC: 114 MG/DL (ref 70–99)
GLUCOSE BLD-MCNC: 190 MG/DL (ref 70–99)
PERFORMED ON: ABNORMAL
PERFORMED ON: ABNORMAL

## 2019-04-11 PROCEDURE — G0378 HOSPITAL OBSERVATION PER HR: HCPCS

## 2019-04-11 PROCEDURE — 6360000002 HC RX W HCPCS: Performed by: UROLOGY

## 2019-04-11 PROCEDURE — 51798 US URINE CAPACITY MEASURE: CPT

## 2019-04-11 PROCEDURE — 94640 AIRWAY INHALATION TREATMENT: CPT

## 2019-04-11 PROCEDURE — 6370000000 HC RX 637 (ALT 250 FOR IP): Performed by: UROLOGY

## 2019-04-11 PROCEDURE — 2700000000 HC OXYGEN THERAPY PER DAY

## 2019-04-11 PROCEDURE — 96376 TX/PRO/DX INJ SAME DRUG ADON: CPT

## 2019-04-11 PROCEDURE — 94761 N-INVAS EAR/PLS OXIMETRY MLT: CPT

## 2019-04-11 PROCEDURE — 93010 ELECTROCARDIOGRAM REPORT: CPT | Performed by: INTERNAL MEDICINE

## 2019-04-11 PROCEDURE — 2580000003 HC RX 258: Performed by: UROLOGY

## 2019-04-11 PROCEDURE — 96374 THER/PROPH/DIAG INJ IV PUSH: CPT

## 2019-04-11 PROCEDURE — 94150 VITAL CAPACITY TEST: CPT

## 2019-04-11 RX ORDER — ROPINIROLE 1 MG/1
2 TABLET, FILM COATED ORAL NIGHTLY
Status: DISCONTINUED | OUTPATIENT
Start: 2019-04-11 | End: 2019-04-11 | Stop reason: HOSPADM

## 2019-04-11 RX ORDER — LEVALBUTEROL 1.25 MG/.5ML
1.25 SOLUTION, CONCENTRATE RESPIRATORY (INHALATION) EVERY 4 HOURS PRN
Status: DISCONTINUED | OUTPATIENT
Start: 2019-04-11 | End: 2019-04-11 | Stop reason: HOSPADM

## 2019-04-11 RX ADMIN — BRIMONIDINE TARTRATE 1 DROP: 2 SOLUTION OPHTHALMIC at 08:34

## 2019-04-11 RX ADMIN — Medication 2 G: at 00:53

## 2019-04-11 RX ADMIN — PREDNISONE 10 MG: 10 TABLET ORAL at 08:08

## 2019-04-11 RX ADMIN — OXYCODONE AND ACETAMINOPHEN 1 TABLET: 5; 325 TABLET ORAL at 00:58

## 2019-04-11 RX ADMIN — OXYCODONE AND ACETAMINOPHEN 1 TABLET: 5; 325 TABLET ORAL at 05:31

## 2019-04-11 RX ADMIN — VENLAFAXINE HYDROCHLORIDE 150 MG: 75 CAPSULE, EXTENDED RELEASE ORAL at 08:08

## 2019-04-11 RX ADMIN — FLUTICASONE PROPIONATE 1 SPRAY: 50 SPRAY, METERED NASAL at 08:34

## 2019-04-11 RX ADMIN — Medication 2 G: at 08:08

## 2019-04-11 RX ADMIN — OXYCODONE AND ACETAMINOPHEN 1 TABLET: 5; 325 TABLET ORAL at 11:02

## 2019-04-11 RX ADMIN — LEVALBUTEROL 1.25 MG: 1.25 SOLUTION, CONCENTRATE RESPIRATORY (INHALATION) at 07:35

## 2019-04-11 RX ADMIN — LEVOTHYROXINE SODIUM 100 MCG: 100 TABLET ORAL at 05:31

## 2019-04-11 RX ADMIN — DOCUSATE SODIUM 100 MG: 100 CAPSULE, LIQUID FILLED ORAL at 08:09

## 2019-04-11 RX ADMIN — HYDROMORPHONE HYDROCHLORIDE 0.5 MG: 2 INJECTION, SOLUTION INTRAMUSCULAR; INTRAVENOUS; SUBCUTANEOUS at 04:14

## 2019-04-11 RX ADMIN — HYDROMORPHONE HYDROCHLORIDE 0.5 MG: 2 INJECTION, SOLUTION INTRAMUSCULAR; INTRAVENOUS; SUBCUTANEOUS at 08:34

## 2019-04-11 RX ADMIN — SODIUM CHLORIDE, POTASSIUM CHLORIDE, SODIUM LACTATE AND CALCIUM CHLORIDE: 600; 310; 30; 20 INJECTION, SOLUTION INTRAVENOUS at 00:58

## 2019-04-11 RX ADMIN — PANTOPRAZOLE SODIUM 40 MG: 40 TABLET, DELAYED RELEASE ORAL at 05:31

## 2019-04-11 ASSESSMENT — PAIN SCALES - GENERAL
PAINLEVEL_OUTOF10: 7
PAINLEVEL_OUTOF10: 9
PAINLEVEL_OUTOF10: 7
PAINLEVEL_OUTOF10: 6
PAINLEVEL_OUTOF10: 8
PAINLEVEL_OUTOF10: 7
PAINLEVEL_OUTOF10: 8

## 2019-04-11 ASSESSMENT — PAIN DESCRIPTION - DESCRIPTORS
DESCRIPTORS: ACHING;PRESSURE
DESCRIPTORS: PRESSURE;SHARP

## 2019-04-11 ASSESSMENT — PAIN DESCRIPTION - PAIN TYPE
TYPE: SURGICAL PAIN
TYPE: SURGICAL PAIN

## 2019-04-11 ASSESSMENT — PAIN DESCRIPTION - LOCATION
LOCATION: ABDOMEN
LOCATION: VAGINA

## 2019-04-11 ASSESSMENT — PAIN DESCRIPTION - ORIENTATION: ORIENTATION: LOWER

## 2019-04-11 ASSESSMENT — PAIN - FUNCTIONAL ASSESSMENT: PAIN_FUNCTIONAL_ASSESSMENT: PREVENTS OR INTERFERES SOME ACTIVE ACTIVITIES AND ADLS

## 2019-04-11 NOTE — PROGRESS NOTES
Smith removed per md order. speciman collection container placed in toilet and encouraged pt to notify nurse when urinating. Marvie Halsted, RN

## 2019-04-11 NOTE — ANESTHESIA POSTPROCEDURE EVALUATION
Department of Anesthesiology  Postprocedure Note    Patient: Robert Serna  MRN: [id number]  YOB: 1960  Date of evaluation: 4/10/2019  Time:  10:53 PM     Procedure Summary     Date:  04/10/19 Room / Location:  [facility] 02 / [place] [facility]    Anesthesia Start:  1653 Anesthesia Stop:  1813    Procedure:  CYSTOSCOPY, TRANSVAGINAL TAPING, CYSTOCELE REPAIR (N/A Vagina ) Diagnosis:  (MIXED INCONTINENCE)    Surgeon:  Tony Tanner MD Responsible Provider:  Melchor Olvera MD    Anesthesia Type:  general ASA Status:  3          Anesthesia Type: general    Abhay Phase I: Abhay Score: 8    Abhay Phase II:      Last vitals: Reviewed and per EMR flowsheets.        Anesthesia Post Evaluation    Patient location during evaluation: PACU  Patient participation: complete - patient participated  Level of consciousness: awake and alert  Pain score: 0  Airway patency: patent  Nausea & Vomiting: no nausea and no vomiting  Complications: no  Cardiovascular status: blood pressure returned to baseline  Respiratory status: acceptable  Hydration status: stable

## 2019-04-11 NOTE — OP NOTE
[address]                 [address]                                OPERATIVE REPORT    PATIENT NAME: Gideon Cordon                   :        1960  MED REC NO:   [id number]                          ROOM:       0217  ACCOUNT NO:   [de-identified]                           ADMIT DATE: 04/10/2019  PROVIDER:     Geo Zamora MD    DATE OF PROCEDURE:  04/10/2019    PREOPERATIVE DIAGNOSES:  1. History of stress incontinence. 2.  Pelvic prolapse. POSTOPERATIVE DIAGNOSES:  1. History of stress incontinence. 2.  Pelvic prolapse. OPERATION PERFORMED:  1. Transvaginal tape placement. 2.  Transvaginal cystocele repair. 3.  Cystourethroscopy. PRIMARY SURGEON:  Geo Zamora MD    ANESTHESIA:  General.    OPERATIVE FINDINGS:  Pelvic prolapse with adherent scar tissue from  prior surgery. HISTORY:  This is a 61-year-old white female with a history of voiding  issues including stress incontinence and pelvic prolapse. Options were  discussed with the patient and she elected to proceed forth with today's  procedure. The risks, benefits, and expected outcomes of the procedure  were discussed in detail and all of her questions were answered to her  satisfaction. DETAILS OF THE PROCEDURE:  After obtaining informed consent, the patient  was taken to the operative suite. She was given a general anesthetic  and intravenous antibiotics. Once adequately anesthetized, she was  placed on the operative table in a modified dorsal lithotomy position. Pneumatic boots had been applied and all pressure points were well  padded. Shave, prep, and drape was then done in sterile fashion  including an aggressive vaginal prep. At this point, labial retraction sutures were placed followed by a Smith  catheter and weighted vaginal speculum.   The anterior vaginal wall was  infiltrated with Marcaine/epinephrine solution and a midline incision  was made from the mid urethra to the base of the cystocele. Using a  combination of sharp and blunt dissection, lateral flaps were then  created. Once the dissection had been carried out as far lateral as was  needed, the lateral fascial attachments and pubocervical ligaments were  then brought in to the midline using interrupted 2-0 Vicryl suture. This effectively raised the portion of the bladder which was prolapsing. Further dissection was carried out in the bladder neck area with the  bladder decompressed and the bladder neck counter retracted with a Smith  catheter and straight catheter guide. A curved trocar was then placed  on the undersurface of the pubic symphysis and advanced through a stab  wound in the lower suprapubic area. This was done on the contralateral  side using the same technique. Cystourethroscopy was then performed  which did not reveal any perforation of the bladder from the trocar or  the mesh kit. As well, during the cystoscopy, both ureteral orifices  were found to have good efflux of urine indicating no evidence for  obstruction. The bladder was then drained and Smith catheter was  repositioned. A sling was placed in position at the level of the  bladder neck, proximal urethra, and excess material was trimmed at the  suprapubic site. The sling was fixed into this area in a loose fashion  such that there was no tension on the bladder neck and urethra. A  thorough irrigation of the operative field was then done with antibiotic  solution. The vaginal epithelium was then reapproximated with 2-0  Vicryl interrupted sutures. Vaginal packing was placed as well as the  Smith catheter being left to gravity drainage. The labial retraction  sutures were removed. Dry sterile dressings were placed on the  suprapubic wounds. The patient was then taken back to the postop  recovery room in stable condition.   Prior to the patient leaving the OR,  the sponge and needle counts

## 2019-04-11 NOTE — PROGRESS NOTES
RESPIRATORY THERAPY ASSESSMENT    Name:  [address] [place] [address] Record Number:  [id number]  Age: 66 y.o. Gender: female  : 1960  Today's Date:  2019  Room:  [address] [place] [address]    Assessment     Is the patient being admitted for a COPD or Asthma exacerbation? No   (If yes the patient will be seen every 4 hours for the first 24 hours and then reassessed)    Patient Admission Diagnosis      Allergies  Allergies   Allergen Reactions    Budesonide Anaphylaxis and Swelling     Throat swelled    Tiotropium Bromide Monohydrate Anaphylaxis and Rash    Albuterol Swelling    Cymbalta [Duloxetine Hcl] Other (See Comments) and Swelling     Throat swelled  suicidal  Throat swelling    Maxalt [Rizatriptan Benzoate]      Does not recall    Meloxicam Other (See Comments)     Unsure of Rx  crazy    Risperidone      Unsure of Rx    Rizatriptan Other (See Comments)     Pt unsure    Spiriva [Tiotropium Bromide Monohydrate] Swelling     Neck swelling    Topamax      \"didn't work\"    Topiramate Other (See Comments) and Swelling     unsure    Buspirone Nausea And Vomiting       Minimum Predicted Vital Capacity:     680          Actual Vital Capacity:      350              Pulmonary History:COPD  Home Oxygen Therapy:  room air  Home Respiratory Therapy:  Duoneb, Albuterol Q4PRN,  Albuterol 2PUFFS Q4PRN,  Breo Ellipta Daily. Current Respiratory Therapy:  Xopenex HHN Q4RPN,  Dulera 200-5 2puffs BID,            Respiratory Severity Index(RSI)   Patients with orders for inhalation medications, oxygen, or any therapeutic treatment modality will be placed on Respiratory Protocol. They will be assessed with the first treatment and at least every 72 hours thereafter. The following severity scale will be used to determine frequency of treatment intervention.     Smoking History: Pulmonary Disease or Smoking History, Greater than 15 pack year = 2    Social History  Social History     Tobacco Use    Smoking status: Current Every Day Smoker     Packs/day: 1.00     Years: 38.00     Pack years: 38.00     Types: Cigarettes     Start date: 1978    Smokeless tobacco: Never Used   Substance Use Topics    Alcohol use: No     Alcohol/week: 0.0 oz    Drug use: No       Recent Surgical History: None = 0  Past Surgical History  Past Surgical History:   Procedure Laterality Date    CARPAL TUNNEL RELEASE Bilateral     right and left     SECTION      x 2    CHOLECYSTECTOMY      COLONOSCOPY      COLONOSCOPY  2016    diverticulosis    CYST REMOVAL  2006    hidradenitis?     EYE SURGERY Right 16    cataract    EYE SURGERY Left 16    cataract removal    FOOT SURGERY  2018    GLAUCOMA SURGERY Bilateral     twice in right eye    HYSTERECTOMY      total    KNEE SURGERY Right 2018    OTHER SURGICAL HISTORY  2015    left oopherectomy    OTHER SURGICAL HISTORY  04/10/2019    CYSTOSCOPY, TRANSVAGINAL TAPING, CYSTOCELE REPAIR     ROTATOR CUFF REPAIR Right  and Brinda 1 2013    x2    THROAT SURGERY  2018    mass revmoved vocal cord     UPPER GASTROINTESTINAL ENDOSCOPY  2016    small bowel bx    URETHRA SURGERY  2018       Level of Consciousness: Alert, Oriented, and Cooperative = 0    Level of Activity: Walking unassisted = 0    Respiratory Pattern: Regular Pattern; RR 8-20 = 0    Breath Sounds: Diminshed bilaterally and/or crackles = 2    Sputum   ,  ,    Cough: Strong, spontaneous, non-productive = 0    Vital Signs   BP (!) 106/51   Pulse 71   Temp 97.4 °F (36.3 °C) (Oral)   Resp 16   Ht 5' (1.524 m)   Wt 170 lb (77.1 kg)   LMP  (LMP Unknown)   SpO2 98%   BMI 33.20 kg/m²   SPO2 (COPD values may differ): 88-89% on room air or greater than 92% on FiO2 28- 35% = 2    Peak Flow (asthma only): not applicable = 0    RSI: 5-6 = Q4hr PRN (every four hours as needed) for dyspnea        Plan       Goals: medication delivery    Patient/caregiver was educated on the proper method of use for Respiratory Care Devices:        Level of patient/caregiver understanding able to:   ? Verbalize understanding   ? Demonstrate understanding       ? Teach back        ? Needs reinforcement       ? No available caregiver               ? Other:     Response to education:       Is patient being placed on Home Treatment Regimen? No     Does the patient have everything they need prior to discharge? NA     Comments: Chart reviewed and patient assessed. Plan of Care: Xopenex Q4PRN,  Dulera 200-5 2puffs BID. Electronically signed by Naresh Baez RCP on 4/11/2019 at 4:12 AM    Respiratory Protocol Guidelines     1. Assessment and treatment by Respiratory Therapy will be initiated for medication and therapeutic interventions upon initiation of aerosolized medication. 2. Physician will be contacted for respiratory rate (RR) greater than 35 breaths per minute. Therapy will be held for heart rate (HR) greater than 140 beats per minute, pending direction from physician. 3. Bronchodilators will be administered via Metered Dose Inhaler (MDI) with spacer when the following criteria are met:  a. Alert and cooperative     b. HR < 140 bpm  c. RR < 30 bpm                d. Can demonstrate a 2-3 second inspiratory hold  4. Bronchodilators will be administered via Hand Held Nebulizer [facility]) to patients when ANY of the following criteria are met  a. Incognizant or uncooperative          b. Patients treated with HHN at Home        c. Unable to demonstrate proper use of MDI with spacer     d. RR > 30 bpm   5. Bronchodilators will be delivered via Metered Dose Inhaler (MDI), HHN, Aerogen to intubated patients on mechanical ventilation. 6. Inhalation medication orders will be delivered and/or substituted as outlined below. Aerosolized Medications Ordering and Administration Guidelines:    1.  All Medications will be ordered by a physician, and their frequency and/or modality will be adjusted as defined by the patients Respiratory Severity Index (RSI) score. 2. If the patient does not have documented COPD, consider discontinuing anticholinergics when RSI is less than 9.  3. If the bronchospasm worsens (increased RSI), then the bronchodilator frequency can be increased to a maximum of every 4 hours. If greater than every 4 hours is required, the physician will be contacted. 4. If the bronchospasm improves, the frequency of the bronchodilator can be decreased, based on the patient's RSI, but not less than home treatment regimen frequency. 5. Bronchodilator(s) will be discontinued if patient has a RSI less than 9 and has received no scheduled or as needed treatment for 72  Hrs. Patients Ordered on a Mucolytic Agent:    1. Must always be administered with a bronchodilator. 2. Discontinue if patient experiences worsened bronchospasm, or secretions have lessened to the point that the patient is able to clear them with a cough. Anti-inflammatory and Combination Medications:    1. If the patient lacks prior history of lung disease, is not using inhaled anti-inflammatory medication at home, and lacks wheezing by examination or by history for at least 24 hours, contact physician for possible discontinuation.

## 2019-04-11 NOTE — PROGRESS NOTES
Urology Progress Note      Subjective:   Pt c/o SP/vaginal discomfort  Unchanged since packing removed    Vitals:  BP (!) 106/55   Pulse 67   Temp 97.2 °F (36.2 °C) (Oral)   Resp 16   Ht 5' (1.524 m)   Wt 170 lb (77.1 kg)   LMP  (LMP Unknown)   SpO2 95%   BMI 33.20 kg/m²   Temp  Av.4 °F (36.3 °C)  Min: 96.7 °F (35.9 °C)  Max: 98.1 °F (36.7 °C)    Intake/Output Summary (Last 24 hours) at 2019 0917  Last data filed at 2019 0845  Gross per 24 hour   Intake 3700 ml   Output 1075 ml   Net 2625 ml       Exam:   abd soft, NT  Jessica with clear urine  Calves supple, SCD's in place    Labs:  WBC:    Lab Results   Component Value Date    WBC 12.0 2018     Hemoglobin/Hematocrit:    Lab Results   Component Value Date    HGB 11.1 2018    HCT 34.7 2018     BMP:    Lab Results   Component Value Date     04/10/2019    K 4.4 04/10/2019     04/10/2019    CO2 22 04/10/2019    BUN 13 04/10/2019    LABALBU 4.4 2018    CREATININE 0.7 04/10/2019    CALCIUM 10.0 04/10/2019    GFRAA >60 04/10/2019    GFRAA >60 2013    LABGLOM >60 04/10/2019     PT/INR:    Lab Results   Component Value Date    PROTIME 11.7 2017    INR 1.04 2017     PTT:    Lab Results   Component Value Date    APTT 27.3 2017   [APTT    Urinalysis:     Urine Culture:      Blood Culture:      Antibiotic Therapy:      Imaging:       Impression/Plan:   POD 1-cystocele repair, TVT, cystoscopy  AVSS  Packing out  DC jessica for voiding trial  Home today if pain is under control    Rhesa Carrel, PA-C

## 2019-04-11 NOTE — PROGRESS NOTES
Prescriptions and discharge instructions given, pt verbalized understanding, denies questions/ needs at this time. Request sent to transport for wheelchair transport to vehicle for discharge home. Raj Velasco RN

## 2019-04-11 NOTE — PROGRESS NOTES
Pt voided 375 clear yellow urine, bladder scan revealed 11ml, call placed to urology, ok to d/c without jessica.

## 2019-04-11 NOTE — CARE COORDINATION
[facility] reviewed and screened by CM for DCP needs. No HC or DME needs noted. Please consult CM PRN.  NNNF

## 2019-04-12 NOTE — DISCHARGE SUMMARY
Urology Discharge Summary      Patient Identification  Barb Merino is a 66 y.o. female. :  1960  Admit Date:  4/10/2019    Discharge date:   2019  2:15 PM                                   Disposition: home    Discharge Diagnoses: cystocele and stress urinary incontinence  Patient Active Problem List   Diagnosis    TIA (transient ischemic attack)    Acquired hypothyroidism    Dyspnea    Tobacco abuse    Abnormal PFT    Centrilobular emphysema (HCC)    Excessive sleepiness    Abnormal finding on Pap smear, HPV DNA positive    Restless leg syndrome    Leg pain    Diabetic polyneuropathy ([address]    Uncontrolled type 2 diabetes mellitus with hyperosmolarity without coma, without long-term current use of insulin (HCC)    Restless legs syndrome (RLS)    Low grade squamous intraepith lesion on cytologic smear vagina (lgsil)    Ovarian cyst, left    Fracture of coccyx (HCC)    Fracture of spinal vertebra    Hip pain    Cataract    Glaucoma    Viral upper respiratory tract infection    Migraine with aura and without status migrainosus, not intractable    Essential hypertension    Moderate episode of recurrent major depressive disorder (HCC)    Bilateral carotid artery disease (HCC)    Neck pain of over 3 months duration    New persistent daily headache    Cervical disc disorder of mid-cervical region    Headache, cervicogenic    HTN (hypertension), benign    Dysthymia    Uncontrolled type 2 diabetes mellitus with complication, without long-term current use of insulin ([address]    Dyslipidemia       Surgery:   1. Transvaginal tape placement. 2.  Transvaginal cystocele repair. 3.  Cystourethroscopy    Activity:  no lifting, Driving, or Strenuous exercise until cleared by Dr. Talia Currie on discharge:   stable    Follow-up:   3-4 weeks with Dr. Ana Rosa Herman course:     This is a 32-year-old white female with a history of voiding issues including stress incontinence and pelvic prolapse. She elected to undergo the above procedure on 4/10/19. Surgery went without complications. Postoperative course was routine.  She was discharged home the following day pending voiding trial.         Rich Middleton PA-C

## 2019-04-29 DIAGNOSIS — E03.9 HYPOTHYROIDISM, UNSPECIFIED TYPE: ICD-10-CM

## 2019-04-29 RX ORDER — LEVOTHYROXINE SODIUM 0.1 MG/1
TABLET ORAL
Qty: 30 TABLET | Refills: 11 | Status: SHIPPED | OUTPATIENT
Start: 2019-04-29 | End: 2020-04-20

## 2019-05-08 ENCOUNTER — HOSPITAL ENCOUNTER (OUTPATIENT)
Dept: GENERAL RADIOLOGY | Age: 59
Discharge: HOME | End: 2019-05-08
Payer: COMMERCIAL

## 2019-05-08 ENCOUNTER — OFFICE VISIT (OUTPATIENT)
Dept: FAMILY MEDICINE CLINIC | Age: 59
End: 2019-05-08
Payer: COMMERCIAL

## 2019-05-08 VITALS
WEIGHT: 158.8 LBS | OXYGEN SATURATION: 98 % | SYSTOLIC BLOOD PRESSURE: 120 MMHG | DIASTOLIC BLOOD PRESSURE: 62 MMHG | HEART RATE: 85 BPM | TEMPERATURE: 98.3 F | BODY MASS INDEX: 31.01 KG/M2

## 2019-05-08 DIAGNOSIS — R09.89 RHONCHI AT BOTH LUNG BASES: ICD-10-CM

## 2019-05-08 DIAGNOSIS — R09.89 RHONCHI AT BOTH LUNG BASES: Primary | ICD-10-CM

## 2019-05-08 DIAGNOSIS — J18.9 PNEUMONIA OF BOTH LOWER LOBES DUE TO INFECTIOUS ORGANISM: ICD-10-CM

## 2019-05-08 PROCEDURE — 4004F PT TOBACCO SCREEN RCVD TLK: CPT | Performed by: NURSE PRACTITIONER

## 2019-05-08 PROCEDURE — 71046 X-RAY EXAM CHEST 2 VIEWS: CPT

## 2019-05-08 PROCEDURE — 3017F COLORECTAL CA SCREEN DOC REV: CPT | Performed by: NURSE PRACTITIONER

## 2019-05-08 PROCEDURE — G8427 DOCREV CUR MEDS BY ELIG CLIN: HCPCS | Performed by: NURSE PRACTITIONER

## 2019-05-08 PROCEDURE — G8417 CALC BMI ABV UP PARAM F/U: HCPCS | Performed by: NURSE PRACTITIONER

## 2019-05-08 PROCEDURE — 99214 OFFICE O/P EST MOD 30 MIN: CPT | Performed by: NURSE PRACTITIONER

## 2019-05-08 RX ORDER — LEVOFLOXACIN 500 MG/1
500 TABLET, FILM COATED ORAL DAILY
Qty: 10 TABLET | Refills: 0 | Status: SHIPPED | OUTPATIENT
Start: 2019-05-08 | End: 2019-05-18

## 2019-05-08 RX ORDER — METHYLPREDNISOLONE 4 MG/1
TABLET ORAL
Qty: 1 KIT | Refills: 0 | Status: SHIPPED | OUTPATIENT
Start: 2019-05-08 | End: 2019-05-14

## 2019-05-08 ASSESSMENT — ENCOUNTER SYMPTOMS
COUGH: 1
ABDOMINAL PAIN: 0
SPUTUM PRODUCTION: 0
FREQUENT THROAT CLEARING: 1
SHORTNESS OF BREATH: 1
CHEST TIGHTNESS: 1
RHINORRHEA: 0
DIFFICULTY BREATHING: 1
SORE THROAT: 0
WHEEZING: 0

## 2019-05-08 NOTE — PROGRESS NOTES
2019     Sherwin Jensen (:  1960) is a 66 y.o. female, here for evaluation of the following medical concerns: She is here today with back pain and cough, along with Shortness of breath. She has taken tylenol and cough syrup that wasn't helpful. She's never had this. Can't smoke due to the cough and shortness of breath. Cough   This is a new problem. The current episode started 1 to 4 weeks ago. The problem has been gradually worsening. The problem occurs every few minutes. The cough is non-productive. Associated symptoms include chills, ear congestion, headaches and shortness of breath. Pertinent negatives include no ear pain, fever, nasal congestion, postnasal drip, rhinorrhea, sore throat, sweats or wheezing. Associated symptoms comments: Chest tenderness. The symptoms are aggravated by lying down (walk). Risk factors for lung disease include smoking/tobacco exposure. Treatments tried: see note- using inhaler multiple times a day. The treatment provided no relief. Her past medical history is significant for bronchitis, COPD and emphysema. There is no history of asthma. Pneumonia   She complains of chest tightness, cough, difficulty breathing, frequent throat clearing and shortness of breath. There is no sputum production or wheezing. This is a new problem. The current episode started 1 to 4 weeks ago. The problem occurs constantly. The problem has been gradually worsening. The cough is non-productive. Associated symptoms include dyspnea on exertion, ear congestion, headaches and malaise/fatigue. Pertinent negatives include no ear pain, fever, nasal congestion, postnasal drip, rhinorrhea, sore throat or sweats. Her symptoms are aggravated by any activity, exposure to smoke and lying down. Her symptoms are alleviated by nothing. She reports no improvement on treatment. Risk factors for lung disease include smoking/tobacco exposure.  Her past medical history is significant for bronchitis, COPD and emphysema. There is no history of asthma. Review of Systems   Constitutional: Positive for activity change, chills, fatigue and malaise/fatigue. Negative for fever. HENT: Negative for ear pain, postnasal drip, rhinorrhea and sore throat. Respiratory: Positive for cough, chest tightness and shortness of breath. Negative for sputum production and wheezing. Cardiovascular: Positive for dyspnea on exertion. Gastrointestinal: Negative for abdominal pain. Genitourinary: Negative for difficulty urinating. Skin: Negative. Neurological: Positive for headaches. Psychiatric/Behavioral: Negative for confusion. Prior to Visit Medications    Medication Sig Taking? Authorizing Provider   levofloxacin (LEVAQUIN) 500 MG tablet Take 1 tablet by mouth daily for 10 days Yes VERONIKA Ac CNP   methylPREDNISolone (MEDROL DOSEPACK) 4 MG tablet Take by mouth. Yes VERONIKA Ac CNP   metFORMIN (GLUCOPHAGE-XR) 500 MG extended release tablet TAKE 1 TABLET BY MOUTH EVERY DAY WITH BREAKFAST Yes Amol Espinoza MD   levothyroxine (SYNTHROID) 100 MCG tablet TAKE ONE TABLET BY MOUTH EVERY DAY Yes Amol Espinoza MD   Blood Glucose Monitoring Suppl (FREESTYLE LITE) CONNIE 1 Device by Does not apply route daily Yes VERONIKA Ac CNP   blood glucose test strips (FREESTYLE LITE) strip 1 each by In Vitro route daily As needed.  Yes VERONIKA Ac CNP   omeprazole (PRILOSEC) 40 MG delayed release capsule TAKE 1 CAPSULE BY MOUTH TWICE DAILY Yes Amol Espinoza MD   pravastatin (PRAVACHOL) 40 MG tablet TAKE 1 TABLET BY MOUTH EVERY EVENING Yes Amol Espinoza MD   BREO ELLIPTA 100-25 MCG/INH AEPB inhaler INHALE 1 PUFF INTO THE LUNGS DAILY Yes Andre Villatoro MD   Hydrogen Peroxide 1.5 % SOLN Take 5 mLs by mouth 4 times daily Swish 5 mls by mouth and spit 4 times a day Yes VERONIKA Ac CNP   Handicap Placard [facility] Juan Alba,  1960, qualifies for a handicap placard Provider, MD   venlafaxine (EFFEXOR-XR) 75 MG XR capsule   Take 150 mg by mouth See Admin Instructions 150mg in morning, 75mg at night Yes Historical Provider, MD   lamotrigine (LAMICTAL) 100 MG tablet Take 100 mg by mouth 2 times daily. Yes Historical Provider, MD   timolol (BETIMOL) 0.5 % ophthalmic solution 1 drop 2 times daily. Yes Historical Provider, MD   brimonidine (ALPHAGAN P) 0.15 % ophthalmic solution 1 drop 2 times daily. Yes Historical Provider, MD   albuterol (PROVENTIL) (2.5 MG/3ML) 0.083% nebulizer solution Take 3 mLs by nebulization every 4 hours as needed for Wheezing  Carole Pacheco MD        Social History     Tobacco Use    Smoking status: Current Every Day Smoker     Packs/day: 1.00     Years: 38.00     Pack years: 38.00     Types: Cigarettes     Start date: 1/1/1978    Smokeless tobacco: Never Used   Substance Use Topics    Alcohol use: No     Alcohol/week: 0.0 oz        Vitals:    05/08/19 1413   BP: 120/62   Pulse: 85   Temp: 98.3 °F (36.8 °C)   TempSrc: Oral   SpO2: 98%   Weight: 158 lb 12.8 oz (72 kg)     Estimated body mass index is 31.01 kg/m² as calculated from the following:    Height as of 4/8/19: 5' (1.524 m). Weight as of this encounter: 158 lb 12.8 oz (72 kg). Physical Exam   Constitutional: She is oriented to person, place, and time. Vital signs are normal. She appears well-developed and well-nourished. No distress. HENT:   Head: Normocephalic and atraumatic. Right Ear: External ear normal.   Left Ear: External ear normal.   Nose: Nose normal.   Eyes: Pupils are equal, round, and reactive to light. Conjunctivae and EOM are normal. Right eye exhibits no discharge. Left eye exhibits no discharge. Neck: Normal range of motion. Neck supple. Cardiovascular: Normal rate, regular rhythm and normal heart sounds. Pulmonary/Chest: Effort normal. She has decreased breath sounds. She has wheezes. She has rhonchi. Abdominal: Soft.  Bowel sounds are normal. Musculoskeletal: Normal range of motion. She exhibits no deformity. Neurological: She is alert and oriented to person, place, and time. Skin: Skin is warm and dry. No rash noted. She is not diaphoretic. Psychiatric: She has a normal mood and affect. Her behavior is normal. Judgment and thought content normal.   Vitals reviewed. ASSESSMENT/PLAN:  Ubaldo Rebolledo was seen today for other and cough. Diagnoses and all orders for this visit:    Rhonchi at both lung bases  -     XR CHEST STANDARD (2 VW); Future  -     levofloxacin (LEVAQUIN) 500 MG tablet; Take 1 tablet by mouth daily for 10 days  -     methylPREDNISolone (MEDROL DOSEPACK) 4 MG tablet; Take by mouth. Pneumonia of both lower lobes due to infectious organism ([address]  -     XR CHEST STANDARD (2 VW); Future  -     levofloxacin (LEVAQUIN) 500 MG tablet; Take 1 tablet by mouth daily for 10 days  -     methylPREDNISolone (MEDROL DOSEPACK) 4 MG tablet; Take by mouth. Recent surgery- 4/10/19  Use albuterol and nebulizer at home every 4 hours    Repeat xray 6-8 weeks     Return in about 10 days (around 5/18/2019) for pneumonia . An electronic signature was used to authenticate this note. --Trudy Nuñez, JEREMY-BC on 5/8/2019 at2:36 PM

## 2019-05-28 DIAGNOSIS — R32 URINARY INCONTINENCE, UNSPECIFIED TYPE: ICD-10-CM

## 2019-05-28 RX ORDER — OXYBUTYNIN CHLORIDE 5 MG/1
TABLET, EXTENDED RELEASE ORAL
Qty: 30 TABLET | Refills: 3 | Status: SHIPPED | OUTPATIENT
Start: 2019-05-28 | End: 2019-07-31 | Stop reason: ALTCHOICE

## 2019-06-06 RX ORDER — ALBUTEROL SULFATE 90 UG/1
AEROSOL, METERED RESPIRATORY (INHALATION)
Qty: 1 INHALER | Refills: 5 | Status: SHIPPED | OUTPATIENT
Start: 2019-06-06 | End: 2020-06-09

## 2019-06-16 ENCOUNTER — HOSPITAL ENCOUNTER (EMERGENCY)
Age: 59
Discharge: HOME | End: 2019-06-16
Attending: EMERGENCY MEDICINE
Payer: COMMERCIAL

## 2019-06-16 ENCOUNTER — APPOINTMENT (OUTPATIENT)
Dept: GENERAL RADIOLOGY | Age: 59
End: 2019-06-16
Payer: COMMERCIAL

## 2019-06-16 VITALS
TEMPERATURE: 98.3 F | RESPIRATION RATE: 16 BRPM | HEART RATE: 71 BPM | WEIGHT: 165 LBS | BODY MASS INDEX: 32.39 KG/M2 | HEIGHT: 60 IN | SYSTOLIC BLOOD PRESSURE: 125 MMHG | OXYGEN SATURATION: 95 % | DIASTOLIC BLOOD PRESSURE: 71 MMHG

## 2019-06-16 DIAGNOSIS — M25.532 ACUTE PAIN OF LEFT WRIST: Primary | ICD-10-CM

## 2019-06-16 DIAGNOSIS — M67.432 GANGLION CYST OF VOLAR ASPECT OF LEFT WRIST: ICD-10-CM

## 2019-06-16 PROCEDURE — 99283 EMERGENCY DEPT VISIT LOW MDM: CPT

## 2019-06-16 PROCEDURE — 6360000002 HC RX W HCPCS: Performed by: EMERGENCY MEDICINE

## 2019-06-16 PROCEDURE — 73110 X-RAY EXAM OF WRIST: CPT

## 2019-06-16 PROCEDURE — 96372 THER/PROPH/DIAG INJ SC/IM: CPT

## 2019-06-16 RX ORDER — KETOROLAC TROMETHAMINE 30 MG/ML
30 INJECTION, SOLUTION INTRAMUSCULAR; INTRAVENOUS ONCE
Status: COMPLETED | OUTPATIENT
Start: 2019-06-16 | End: 2019-06-16

## 2019-06-16 RX ADMIN — KETOROLAC TROMETHAMINE 30 MG: 30 INJECTION, SOLUTION INTRAMUSCULAR at 05:08

## 2019-06-16 ASSESSMENT — PAIN DESCRIPTION - FREQUENCY: FREQUENCY: CONTINUOUS

## 2019-06-16 ASSESSMENT — PAIN SCALES - GENERAL
PAINLEVEL_OUTOF10: 8
PAINLEVEL_OUTOF10: 8

## 2019-06-16 ASSESSMENT — PAIN DESCRIPTION - DESCRIPTORS: DESCRIPTORS: ACHING;BURNING;SHOOTING

## 2019-06-16 ASSESSMENT — PAIN DESCRIPTION - ORIENTATION: ORIENTATION: LEFT

## 2019-06-16 ASSESSMENT — PAIN DESCRIPTION - PROGRESSION: CLINICAL_PROGRESSION: GRADUALLY WORSENING

## 2019-06-16 ASSESSMENT — PAIN DESCRIPTION - ONSET: ONSET: PROGRESSIVE

## 2019-06-16 ASSESSMENT — PAIN DESCRIPTION - LOCATION: LOCATION: WRIST

## 2019-06-16 ASSESSMENT — PAIN DESCRIPTION - PAIN TYPE: TYPE: ACUTE PAIN

## 2019-06-16 NOTE — ED PROVIDER NOTES
CHIEF COMPLAINT  Wrist Pain      HISTORY OF PRESENT ILLNESS  Mariel Plaza is a 66 y.o. female presents to the ED with wrist pain, for a couple days, much worse at night, left wrist on palm side, with warmth, pain shooting up her arm, no known injury or trauma, she had taken her home Norco but still having pain, reported she is on pain management, no fever, no redness, no sores or drainage. No other complaints, modifying factors or associated symptoms. Smoker    I have reviewed the following from the nursing documentation. Past Medical History:   Diagnosis Date    Anxiety     Bipolar disorder ([facility]     Brain aneurysm     [place]    Closed angle glaucoma     COPD (chronic obstructive pulmonary disease) ([facility]     Hyperlipidemia     Hypertension     Hypothyroidism     Migraines     Open-angle glaucoma of both eyes     RLS (restless legs syndrome)     Rotator cuff disorder     right shoulder    TIA (transient ischemic attack) 11    Tobacco use disorder     Type II or unspecified type diabetes mellitus without mention of complication, not stated as uncontrolled     diet controlled     Past Surgical History:   Procedure Laterality Date    BLADDER SUSPENSION      CARPAL TUNNEL RELEASE Bilateral     right and left     SECTION      x 2    CHOLECYSTECTOMY      COLONOSCOPY      COLONOSCOPY  2016    diverticulosis    CYST REMOVAL  2006    hidradenitis?     EYE SURGERY Right 16    cataract    EYE SURGERY Left 16    cataract removal    FOOT SURGERY  2018    GLAUCOMA SURGERY Bilateral     twice in right eye    HYSTERECTOMY      total    KNEE SURGERY Right 2018    OTHER SURGICAL HISTORY  2015    left oopherectomy    OTHER SURGICAL HISTORY  04/10/2019    CYSTOSCOPY, TRANSVAGINAL TAPING, CYSTOCELE REPAIR     [place] LAP,SLING OPERATION N/A 4/10/2019    CYSTOSCOPY, TRANSVAGINAL TAPING, CYSTOCELE REPAIR performed by [facility]h [facility]  ROTATOR CUFF REPAIR Right 2008 and Brinda 1 2013    x2    THROAT SURGERY  06/2018    mass revmoved vocal cord     THROAT SURGERY      UPPER GASTROINTESTINAL ENDOSCOPY  09/12/2016    small bowel bx    URETHRA SURGERY  06/28/2018     Family History   Problem Relation Age of Onset    Cancer Mother         colon; lung    Emphysema Mother     Hypertension Mother     Heart Disease Father     Heart Failure Father     Bipolar Disorder Sister     Osteoporosis Sister     Asthma Neg Hx     Diabetes Neg Hx      Social History     Socioeconomic History    Marital status:      Spouse name: Not on file    Number of children: Not on file    Years of education: 4    Highest education level: Not on file   Occupational History    Occupation: disabled   Social Needs    Financial resource strain: Not on file    Food insecurity:     Worry: Not on file     Inability: Not on file   LinkedIn needs:     Medical: Not on file     Non-medical: Not on file   Tobacco Use    Smoking status: Current Every Day Smoker     Packs/day: 1.00     Years: 38.00     Pack years: 38.00     Types: Cigarettes     Start date: 1/1/1978    Smokeless tobacco: Never Used   Substance and Sexual Activity    Alcohol use: No     Alcohol/week: 0.0 oz    Drug use: Not on file    Sexual activity: Yes     Partners: Male   Lifestyle    Physical activity:     Days per week: Not on file     Minutes per session: Not on file    Stress: Not on file   Relationships    Social connections:     Talks on phone: Not on file     Gets together: Not on file     Attends Alevism service: Not on file     Active member of club or organization: Not on file     Attends meetings of clubs or organizations: Not on file     Relationship status: Not on file    Intimate partner violence:     Fear of current or ex partner: Not on file     Emotionally abused: Not on file     Physically abused: Not on file     Forced sexual activity: Not on file   Other Topics Concern    Not on file   Social History Narrative    Not on file     No current facility-administered medications for this encounter. Current Outpatient Medications   Medication Sig Dispense Refill    albuterol sulfate  (90 Base) MCG/ACT inhaler INHALE 2 PUFFS INTO THE LUNGS EVERY 4 HOURS AS NEEDED FOR WHEEZING 1 Inhaler 5    BREO ELLIPTA 100-25 MCG/INH AEPB inhaler INHALE 1 PUFF INTO THE LUNGS DAILY 60 each 5    rOPINIRole (REQUIP) 2 MG tablet 2 mg at bedtime and 2 mg in evening as needed prior to bedtime (Patient taking differently: Take 2 mg by mouth 2 times daily 2 mg at bedtime and 2 mg in evening as needed prior to bedtime) 60 tablet 5    losartan (COZAAR) 50 MG tablet TAKE 1 TABLET BY MOUTH EVERY DAY 90 tablet 3    lithium 150 MG capsule Take 450 mg by mouth nightly       ipratropium-albuterol (DUONEB) 0.5-2.5 (3) MG/3ML SOLN nebulizer solution Inhale 3 mLs into the lungs      lisinopril (PRINIVIL;ZESTRIL) 10 MG tablet Take 10 mg by mouth daily      HYDROcodone-acetaminophen (NORCO) 5-325 MG per tablet       venlafaxine (EFFEXOR XR) 150 MG extended release capsule       diazepam (VALIUM) 5 MG tablet Take 5 mg by mouth daily as needed for Anxiety      traZODone (DESYREL) 50 MG tablet Take 50 mg by mouth nightly as needed       lamotrigine (LAMICTAL) 100 MG tablet Take 100 mg by mouth 2 times daily.       omeprazole (PRILOSEC) 40 MG delayed release capsule TAKE 1 CAPSULE BY MOUTH TWICE DAILY 60 capsule 5    pravastatin (PRAVACHOL) 40 MG tablet TAKE 1 TABLET BY MOUTH EVERY EVENING 30 tablet 5    oxybutynin (DITROPAN-XL) 5 MG extended release tablet TAKE 1 TABLET BY MOUTH EVERY DAY 30 tablet 3    metFORMIN (GLUCOPHAGE-XR) 500 MG extended release tablet TAKE 1 TABLET BY MOUTH EVERY DAY WITH BREAKFAST 90 tablet 3    levothyroxine (SYNTHROID) 100 MCG tablet TAKE ONE TABLET BY MOUTH EVERY DAY 30 tablet 11    Blood Glucose Monitoring Suppl (FREESTYLE LITE) CONNIE 1 Device by Does not apply route daily 1 Device 0    blood glucose test strips (FREESTYLE LITE) strip 1 each by In Vitro route daily As needed. 100 each 3    Hydrogen Peroxide 1.5 % SOLN Take 5 mLs by mouth 4 times daily Swish 5 mls by mouth and spit 4 times a day 473 mL 0    Handicap Placard [facility] Kim Frame,  1960, qualifies for a handicap placard renewal.   This should be valid from 3/2019 until 3/2024. 1 each 0    predniSONE (DELTASONE) 10 MG tablet 3 tabs daily for 5 days 15 tablet 0    nicotine polacrilex (NICORETTE) 2 MG gum Take 1 each by mouth as needed for Smoking cessation 100 each 3    methocarbamol (ROBAXIN) 750 MG tablet       albuterol (PROVENTIL) (2.5 MG/3ML) 0.083% nebulizer solution Take 3 mLs by nebulization every 4 hours as needed for Wheezing 1080 mL 3    diclofenac sodium 1 % GEL       amLODIPine (NORVASC) 5 MG tablet Take 1 tablet by mouth daily 30 tablet 5    fluticasone (FLONASE) 50 MCG/ACT nasal spray 1 spray by Nasal route daily 1 Bottle 3    ESTROGENS CONJUGATED PO Take by mouth      venlafaxine (EFFEXOR-XR) 75 MG XR capsule   Take 150 mg by mouth See Admin Instructions 150mg in morning, 75mg at night      timolol (BETIMOL) 0.5 % ophthalmic solution 1 drop 2 times daily.  brimonidine (ALPHAGAN P) 0.15 % ophthalmic solution 1 drop 2 times daily.        Allergies   Allergen Reactions    Budesonide Anaphylaxis and Swelling     Throat swelled    Tiotropium Bromide Monohydrate Anaphylaxis and Rash    Albuterol Swelling    Cymbalta [Duloxetine Hcl] Other (See Comments) and Swelling     Throat swelled  suicidal  Throat swelling    Maxalt [Rizatriptan Benzoate]      Does not recall    Meloxicam Other (See Comments)     Unsure of Rx  crazy    Risperidone      Unsure of Rx    Rizatriptan Other (See Comments)     Pt unsure    Spiriva [Tiotropium Bromide Monohydrate] Swelling     Neck swelling    Topamax      \"didn't work\"    Topiramate Other (See Comments) and Swelling unsure    Buspirone Nausea And Vomiting       REVIEW OF SYSTEMS  10 systems reviewed, pertinent positives per HPI otherwise noted to be negative. PHYSICAL EXAM  /71   Pulse 71   Temp 98.3 °F (36.8 °C) (Axillary)   Resp 16   Ht 5' (1.524 m)   Wt 165 lb (74.8 kg)   LMP  (LMP Unknown)   SpO2 95%   BMI 32.22 kg/m²   GENERAL APPEARANCE: Awake and alert. Cooperative. No acute distress  HEAD: Normocephalic. Atraumatic. EYES: PERRL. EOM's grossly intact. ENT: Mucous membranes are moist.   NECK: Supple. HEART: RRR. No murmurs  LUNGS: Respirations nonlabored, lungs are clear to auscultation bilaterally. ABDOMEN: Soft. Non-distended. Non-tender. No guarding or rebound. Normal bowel sounds. EXTREMITIES: No peripheral edema. Moves all extremities equally. All extremities neurovascularly intact. Left wrist with approximately 2 cm x 2 cm area of edema/mobile soft tender nodularity, 2+ radial pulse, just adjacent to the distal radius, palmar side, distal sensation intact, tendon exam intact, able to perform full range of motion of digits but pain with flexion extension of the wrist  SKIN: Warm and dry. No acute rashes. NEUROLOGICAL: Alert and oriented. No gross facial drooping. Strength 5/5, sensation intact. Normal speech  PSYCHIATRIC: Normal mood and affect. RADIOLOGY  Xr Wrist Left (min 3 Views)    Result Date: 6/16/2019  EXAMINATION: 3 XRAY VIEWS OF THE LEFT WRIST 6/16/2019 4:58 am COMPARISON: None. HISTORY: ORDERING SYSTEM PROVIDED HISTORY: radial palmar left wrist pain/swelling TECHNOLOGIST PROVIDED HISTORY: Reason for exam:->radial palmar left wrist pain/swelling Ordering Physician Provided Reason for Exam: left wrist pain Acuity: Acute Type of Exam: Initial Mechanism of Injury: pt c/o left wrist pain when she woke up, no hx of trauma, states she can feel a large knot on lateral/ap wrist FINDINGS: No acute fracture, dislocation or localized soft tissue swelling is noted.  Skeletal structures are mildly demineralized. Mild arthritic changes are present on the radial aspect of the wrist.  The navicular lunate space is well-preserved. No ulnar minus variance. Mild osteopenia. Mild arthritic changes on the radial aspect of the wrist. No acute osseous abnormality. ED COURSE/MDM  Patient seen and evaluated. Old records reviewed. Labs and imaging reviewed and results discussed with patient. 51-year-old female with left wrist pain, what appears to be a ganglion cyst, no erythema or concern for abscess or cellulitis, given Toradol for pain, wrist x-ray without acute process, only arthritic changes, given a Velcro wrist splint for comfort, and to help avoid irritation from movement, she has diclofenac at home that she had not been taking and was told to start taking it, she also has her Norco that she takes twice a day, she is on pain management and I explained we would not be writing her any more narcotics that she did ask, I explained she could lapse her pain contract if she had other prescriptions filled by other providers, given Ortho follow-up, she has an orthopedist she seen in the past at [facility], encouraged her to follow-up with them, neurovascularly intact, strict return precautions given, will return if any worsening symptoms or new concerns, patient verbalized understanding of plan, felt comfortable going home. Orders Placed This Encounter   Procedures    XR WRIST LEFT (MIN 3 VIEWS)    Velcro Wrist Splint     Orders Placed This Encounter   Medications    ketorolac (TORADOL) injection 30 mg          CLINICAL IMPRESSION  1. Acute pain of left wrist    2. Ganglion cyst of volar aspect of left wrist        Blood pressure 125/71, pulse 71, temperature 98.3 °F (36.8 °C), temperature source Axillary, resp. rate 16, height 5' (1.524 m), weight 165 lb (74.8 kg), SpO2 95 %, not currently breastfeeding.     DISPOSITION  Caity Gimenez was discharged to home in stable condition.                    [facility], DO  06/16/19 1891

## 2019-06-16 NOTE — DISCHARGE INSTR - COC
Continuity of Care Form    Patient Name: Alysha Burch   :  1960  MRN:  [id number]    Admit date:  2019  Discharge date:  ***    Code Status Order: Prior   Advance Directives:     Admitting Physician:  No admitting provider for patient encounter. PCP: Danuta King MD    Discharging Nurse: [facility] Unit/Room#:   Discharging Unit Phone Number: ***    Emergency Contact:   Extended Emergency Contact Information  Primary Emergency Contact: Block,April   [address] Phone: [phone]  Relation: Child    Past Surgical History:  Past Surgical History:   Procedure Laterality Date    BLADDER SUSPENSION      CARPAL TUNNEL RELEASE Bilateral     right and left     SECTION      x 2    CHOLECYSTECTOMY      COLONOSCOPY      COLONOSCOPY  2016    diverticulosis    CYST REMOVAL  2006    hidradenitis?     EYE SURGERY Right 16    cataract    EYE SURGERY Left 16    cataract removal    FOOT SURGERY  2018    GLAUCOMA SURGERY Bilateral     twice in right eye    HYSTERECTOMY      total    KNEE SURGERY Right 2018    OTHER SURGICAL HISTORY  2015    left oopherectomy    OTHER SURGICAL HISTORY  04/10/2019    CYSTOSCOPY, TRANSVAGINAL TAPING, CYSTOCELE REPAIR     AR LAP,SLING OPERATION N/A 4/10/2019    CYSTOSCOPY, TRANSVAGINAL TAPING, CYSTOCELE REPAIR performed by Jim Richardson MD at [address] [place] [address] Right  and Brinda 1 2013    x2    THROAT SURGERY  2018    mass revmoved vocal cord     THROAT SURGERY      UPPER GASTROINTESTINAL ENDOSCOPY  2016    small bowel bx    URETHRA SURGERY  2018       Immunization History:   Immunization History   Administered Date(s) Administered    Influenza Virus Vaccine 10/26/2011, 2012, 10/30/2014, 2015    Influenza, Intradermal, Preservative free 10/17/2013    Influenza, Intradermal, Quadrivalent, Preservative Free 2017    Influenza, Last documented pain score (0-10 scale): Pain Level: 8  Last Weight:   Wt Readings from Last 1 Encounters:   19 165 lb (74.8 kg)     Mental Status:  {IP PT MENTAL STATUS:39485}    IV Access:  [address] IV ACCESS:365719352}    Nursing Mobility/ADLs:  Walking   {P DME RURR:707545687}  Transfer  {P DME JUQY:349809303}  Bathing  {P DME OKJW:205031460}  Dressing  {CHP DME REBW:539423189}  Toileting  {CHP DME WUNY:925974920}  Feeding  {P DME AAT}  Med Admin  {P DME ARXY:470974872}  Med Delivery   {[facility] MED Delivery:847671788}    Wound Care Documentation and Therapy:        Elimination:  Continence:   · Bowel: {YES / KB:73863}  · Bladder: {YES / PN:11225}  Urinary Catheter: {Urinary Catheter:063503039}   Colostomy/Ileostomy/Ileal Conduit: {YES / DD:80811}       Date of Last BM: ***  No intake or output data in the 24 hours ending 19 0458  No intake/output data recorded.     Safety Concerns:     [address] Safety Concerns:201777887}    Impairments/Disabilities:      [address] Impairments/Disabilities:895605352}    Nutrition Therapy:  Current Nutrition Therapy:   [address] Diet List:198072847}    Routes of Feeding: {[facility] DME Other Feedings:953119873}  Liquids: {Slp liquid thickness:16110}  Daily Fluid Restriction: {CHP DME Yes amt example:188701209}  Last Modified Barium Swallow with Video (Video Swallowing Test): {Done Not Done LJT}    Treatments at the Time of Hospital Discharge:   Respiratory Treatments: ***  Oxygen Therapy:  {Therapy; copd oxygen:24124}  Ventilator:    {[facility] Vent LRFF:710856889}    Rehab Therapies: {THERAPEUTIC INTERVENTION:4283900273}  Weight Bearing Status/Restrictions: [employer] Weight Bearin}  Other Medical Equipment (for information only, NOT a DME order):  {EQUIPMENT:461354414}  Other Treatments: ***    Patient's personal belongings (please select all that are sent with patient):  {[facility] DME Belongings:037741261}    RN SIGNATURE:

## 2019-07-19 DIAGNOSIS — J30.9 ALLERGIC RHINITIS: ICD-10-CM

## 2019-07-19 RX ORDER — LORATADINE 10 MG/1
TABLET ORAL
Qty: 90 TABLET | Refills: 1 | Status: SHIPPED | OUTPATIENT
Start: 2019-07-19 | End: 2020-05-11

## 2019-07-26 RX ORDER — ROPINIROLE 2 MG/1
TABLET, FILM COATED ORAL
Qty: 60 TABLET | Refills: 0 | Status: SHIPPED | OUTPATIENT
Start: 2019-07-26 | End: 2019-08-24 | Stop reason: SDUPTHER

## 2019-07-29 RX ORDER — LANCETS 28 GAUGE
EACH MISCELLANEOUS
Qty: 100 EACH | Refills: 0 | Status: SHIPPED | OUTPATIENT
Start: 2019-07-29 | End: 2019-08-27 | Stop reason: SDUPTHER

## 2019-07-30 ENCOUNTER — TELEPHONE (OUTPATIENT)
Dept: CASE MANAGEMENT | Age: 59
End: 2019-07-30

## 2019-07-30 ENCOUNTER — TELEPHONE (OUTPATIENT)
Dept: PULMONOLOGY | Age: 59
End: 2019-07-30

## 2019-07-30 DIAGNOSIS — Z87.891 PERSONAL HISTORY OF TOBACCO USE: Primary | ICD-10-CM

## 2019-07-31 ENCOUNTER — HOSPITAL ENCOUNTER (OUTPATIENT)
Dept: CT IMAGING | Age: 59
Discharge: HOME | End: 2019-07-31
Payer: COMMERCIAL

## 2019-07-31 ENCOUNTER — OFFICE VISIT (OUTPATIENT)
Dept: PULMONOLOGY | Age: 59
End: 2019-07-31
Payer: COMMERCIAL

## 2019-07-31 VITALS
HEIGHT: 60 IN | RESPIRATION RATE: 20 BRPM | DIASTOLIC BLOOD PRESSURE: 60 MMHG | OXYGEN SATURATION: 98 % | SYSTOLIC BLOOD PRESSURE: 112 MMHG | HEART RATE: 79 BPM | WEIGHT: 161 LBS | BODY MASS INDEX: 31.61 KG/M2

## 2019-07-31 DIAGNOSIS — J43.2 CENTRILOBULAR EMPHYSEMA (HCC): Primary | ICD-10-CM

## 2019-07-31 DIAGNOSIS — Z87.891 PERSONAL HISTORY OF TOBACCO USE: ICD-10-CM

## 2019-07-31 DIAGNOSIS — G25.81 RLS (RESTLESS LEGS SYNDROME): ICD-10-CM

## 2019-07-31 PROCEDURE — 3023F SPIROM DOC REV: CPT | Performed by: INTERNAL MEDICINE

## 2019-07-31 PROCEDURE — 99214 OFFICE O/P EST MOD 30 MIN: CPT | Performed by: INTERNAL MEDICINE

## 2019-07-31 PROCEDURE — 4004F PT TOBACCO SCREEN RCVD TLK: CPT | Performed by: INTERNAL MEDICINE

## 2019-07-31 PROCEDURE — G0297 LDCT FOR LUNG CA SCREEN: HCPCS

## 2019-07-31 PROCEDURE — 3017F COLORECTAL CA SCREEN DOC REV: CPT | Performed by: INTERNAL MEDICINE

## 2019-07-31 PROCEDURE — G8926 SPIRO NO PERF OR DOC: HCPCS | Performed by: INTERNAL MEDICINE

## 2019-07-31 PROCEDURE — G8417 CALC BMI ABV UP PARAM F/U: HCPCS | Performed by: INTERNAL MEDICINE

## 2019-07-31 PROCEDURE — G8427 DOCREV CUR MEDS BY ELIG CLIN: HCPCS | Performed by: INTERNAL MEDICINE

## 2019-07-31 NOTE — PROGRESS NOTES
14.71 (68%)  No BD response  PFT 4/29/14 FEV1 2.07 L 86% TLC 3.69 L (80%) DLCO 13.76 64%  PFT 7/19/18 FEV1 2.03 L 88% TLC 3.91 L 85%   DLCO 12.22 58%    6MWT 1120 feet, low sat 95%    IMAGING:    LDCT 7/31/19  Impression   Mild emphysema.  Tiny punctate pulmonary nodules appear unchanged       Echo 2015  - Left ventricle: The cavity size was normal. Wall thickness  was normal. Systolic function was normal. The estimated  ejection fraction was in the range of 60% to 65%. Wall  motion was normal; there were no regional wall motion  abnormalities. Left ventricular diastolic function  parameters were normal. Pulmonary arteries: PA peak  pressure: 37mm Hg (S).    3/12/17 CXR clear lung fields    ASSESSMENT AND PLAN:  1. Pulmonary Centrilobular Emphysema. Working on stopping smoking. Has Breo 100, albuterol MDI and nebs; brings up small volumes mucus daily  2. Low dose chest CT for lung cancer screening July 2020  3. Restless legs syndrome: Requip 2 mg at bedtime and up to 2 mg earlier in day. She is doing well and is stable. Specifically cautioned re: avoiding driving with requip, potential for sleep attack  4. Depression: treated at [facility] in [facility], has been long term treated with Venlafaxine   5. Tobacco abuse -  1 ppd, using gum in effort to quit -- is trying to cut down. Greater than 41 pack year history. .  6. See me back in 6 months       Screening CT scan was considered in a lung cancer screening counseling and shared decision making visit today that included the following elements:   Eligibility: Age: 66. There are no signs or symptoms of lung cancer.   Tobacco History 40 pack-years, quit zero years ago  Verbal counseling has been performed by me to include benefits and harms of screening, follow-up diagnostic testing, over-diagnosis, false positive rate, and total radiation exposure;   I have counseled on the importance of adherence to annual lung cancer LDCT screening, the impact of comorbidities

## 2019-07-31 NOTE — PATIENT INSTRUCTIONS
ultimately found. Additionally, some (about 10%) of the cancers found would not affect your life expectancy, even if undetected and untreated. If you are still smoking, the single most important thing that you can do to reduce your risk of dying of lung cancer is to quit. For this screening to be covered by Medicare and most other insurers, strict criteria must be met. If you do not meet these criteria, but still wish to undergo LDCT testing, you will be required to sign a waiver indicating your willingness to pay for the scan.

## 2019-08-21 ENCOUNTER — OFFICE VISIT (OUTPATIENT)
Dept: FAMILY MEDICINE CLINIC | Age: 59
End: 2019-08-21
Payer: COMMERCIAL

## 2019-08-21 ENCOUNTER — HOSPITAL ENCOUNTER (OUTPATIENT)
Dept: MRI IMAGING | Age: 59
Discharge: HOME | End: 2019-08-21
Payer: COMMERCIAL

## 2019-08-21 ENCOUNTER — HOSPITAL ENCOUNTER (OUTPATIENT)
Age: 59
Discharge: HOME | End: 2019-08-21
Payer: COMMERCIAL

## 2019-08-21 VITALS
TEMPERATURE: 97.9 F | WEIGHT: 160.8 LBS | OXYGEN SATURATION: 99 % | DIASTOLIC BLOOD PRESSURE: 64 MMHG | HEART RATE: 75 BPM | SYSTOLIC BLOOD PRESSURE: 120 MMHG | BODY MASS INDEX: 31.4 KG/M2

## 2019-08-21 DIAGNOSIS — M54.2 NECK PAIN: Primary | ICD-10-CM

## 2019-08-21 DIAGNOSIS — M51.9 LUMBAR DISC DISORDER: ICD-10-CM

## 2019-08-21 DIAGNOSIS — R68.83 CHILLS WITHOUT FEVER: ICD-10-CM

## 2019-08-21 LAB
A/G RATIO: 1.5 (ref 1.1–2.2)
ALBUMIN SERPL-MCNC: 4.5 G/DL (ref 3.4–5)
ALP BLD-CCNC: 87 U/L (ref 40–129)
ALT SERPL-CCNC: 15 U/L (ref 10–40)
ANION GAP SERPL CALCULATED.3IONS-SCNC: 14 MMOL/L (ref 3–16)
AST SERPL-CCNC: 16 U/L (ref 15–37)
BASOPHILS ABSOLUTE: 0.2 K/UL (ref 0–0.2)
BASOPHILS RELATIVE PERCENT: 2.6 %
BILIRUB SERPL-MCNC: <0.2 MG/DL (ref 0–1)
BUN BLDV-MCNC: 9 MG/DL (ref 7–20)
CALCIUM SERPL-MCNC: 9.9 MG/DL (ref 8.3–10.6)
CHLORIDE BLD-SCNC: 101 MMOL/L (ref 99–110)
CHOLESTEROL, FASTING: 213 MG/DL (ref 0–199)
CO2: 23 MMOL/L (ref 21–32)
CREAT SERPL-MCNC: 0.7 MG/DL (ref 0.6–1.1)
EOSINOPHILS ABSOLUTE: 0.4 K/UL (ref 0–0.6)
EOSINOPHILS RELATIVE PERCENT: 4 %
FOLATE: 7.5 NG/ML (ref 4.78–24.2)
GFR AFRICAN AMERICAN: >60
GFR NON-AFRICAN AMERICAN: >60
GLOBULIN: 3.1 G/DL
GLUCOSE FASTING: 127 MG/DL (ref 70–99)
HCT VFR BLD CALC: 34.2 % (ref 36–48)
HDLC SERPL-MCNC: 71 MG/DL (ref 40–60)
HEMOGLOBIN: 10.8 G/DL (ref 12–16)
LDL CHOLESTEROL CALCULATED: 110 MG/DL
LITHIUM DOSE AMOUNT: ABNORMAL
LITHIUM LEVEL: 0.3 MMOL/L (ref 0.6–1.2)
LYMPHOCYTES ABSOLUTE: 3.3 K/UL (ref 1–5.1)
LYMPHOCYTES RELATIVE PERCENT: 35.8 %
MCH RBC QN AUTO: 23.7 PG (ref 26–34)
MCHC RBC AUTO-ENTMCNC: 31.5 G/DL (ref 31–36)
MCV RBC AUTO: 75.1 FL (ref 80–100)
MONOCYTES ABSOLUTE: 0.4 K/UL (ref 0–1.3)
MONOCYTES RELATIVE PERCENT: 4.4 %
NEUTROPHILS ABSOLUTE: 4.9 K/UL (ref 1.7–7.7)
NEUTROPHILS RELATIVE PERCENT: 53.2 %
PDW BLD-RTO: 19.2 % (ref 12.4–15.4)
PLATELET # BLD: 455 K/UL (ref 135–450)
PMV BLD AUTO: 8.1 FL (ref 5–10.5)
POTASSIUM SERPL-SCNC: 4.4 MMOL/L (ref 3.5–5.1)
RBC # BLD: 4.56 M/UL (ref 4–5.2)
SODIUM BLD-SCNC: 138 MMOL/L (ref 136–145)
T3 TOTAL: 1 NG/ML (ref 0.8–2)
T4 FREE: 1.1 NG/DL (ref 0.9–1.8)
TOTAL PROTEIN: 7.6 G/DL (ref 6.4–8.2)
TRIGLYCERIDE, FASTING: 161 MG/DL (ref 0–150)
TSH SERPL DL<=0.05 MIU/L-ACNC: 0.56 UIU/ML (ref 0.27–4.2)
VITAMIN B-12: 417 PG/ML (ref 211–911)
VLDLC SERPL CALC-MCNC: 32 MG/DL
WBC # BLD: 9.3 K/UL (ref 4–11)

## 2019-08-21 PROCEDURE — 72148 MRI LUMBAR SPINE W/O DYE: CPT

## 2019-08-21 PROCEDURE — 85025 COMPLETE CBC W/AUTO DIFF WBC: CPT

## 2019-08-21 PROCEDURE — 80178 ASSAY OF LITHIUM: CPT

## 2019-08-21 PROCEDURE — 83036 HEMOGLOBIN GLYCOSYLATED A1C: CPT

## 2019-08-21 PROCEDURE — 82746 ASSAY OF FOLIC ACID SERUM: CPT

## 2019-08-21 PROCEDURE — 36415 COLL VENOUS BLD VENIPUNCTURE: CPT

## 2019-08-21 PROCEDURE — 82607 VITAMIN B-12: CPT

## 2019-08-21 PROCEDURE — G8417 CALC BMI ABV UP PARAM F/U: HCPCS | Performed by: NURSE PRACTITIONER

## 2019-08-21 PROCEDURE — 84480 ASSAY TRIIODOTHYRONINE (T3): CPT

## 2019-08-21 PROCEDURE — 3017F COLORECTAL CA SCREEN DOC REV: CPT | Performed by: NURSE PRACTITIONER

## 2019-08-21 PROCEDURE — 4004F PT TOBACCO SCREEN RCVD TLK: CPT | Performed by: NURSE PRACTITIONER

## 2019-08-21 PROCEDURE — 80061 LIPID PANEL: CPT

## 2019-08-21 PROCEDURE — 84439 ASSAY OF FREE THYROXINE: CPT

## 2019-08-21 PROCEDURE — 99214 OFFICE O/P EST MOD 30 MIN: CPT | Performed by: NURSE PRACTITIONER

## 2019-08-21 PROCEDURE — 84443 ASSAY THYROID STIM HORMONE: CPT

## 2019-08-21 PROCEDURE — G8427 DOCREV CUR MEDS BY ELIG CLIN: HCPCS | Performed by: NURSE PRACTITIONER

## 2019-08-21 PROCEDURE — 80053 COMPREHEN METABOLIC PANEL: CPT

## 2019-08-21 RX ORDER — IBUPROFEN 800 MG/1
800 TABLET ORAL 2 TIMES DAILY PRN
Qty: 30 TABLET | Refills: 0 | Status: SHIPPED | OUTPATIENT
Start: 2019-08-21 | End: 2019-09-02 | Stop reason: SDUPTHER

## 2019-08-21 RX ORDER — LIDOCAINE 4 G/G
1 PATCH TOPICAL DAILY
Qty: 14 PATCH | Refills: 0 | Status: SHIPPED | OUTPATIENT
Start: 2019-08-21 | End: 2019-09-04

## 2019-08-21 ASSESSMENT — ENCOUNTER SYMPTOMS
TROUBLE SWALLOWING: 0
VISUAL CHANGE: 0

## 2019-08-22 LAB
ESTIMATED AVERAGE GLUCOSE: 162.8 MG/DL
HBA1C MFR BLD: 7.3 %

## 2019-08-27 RX ORDER — ROPINIROLE 2 MG/1
TABLET, FILM COATED ORAL
Qty: 60 TABLET | Refills: 5 | Status: SHIPPED | OUTPATIENT
Start: 2019-08-27 | End: 2020-02-24

## 2019-08-27 RX ORDER — FLUTICASONE FUROATE AND VILANTEROL TRIFENATATE 100; 25 UG/1; UG/1
1 POWDER RESPIRATORY (INHALATION) DAILY
Qty: 1 EACH | Refills: 5 | Status: SHIPPED | OUTPATIENT
Start: 2019-08-27 | End: 2020-09-21

## 2019-09-02 DIAGNOSIS — M54.2 NECK PAIN: ICD-10-CM

## 2019-09-03 RX ORDER — IBUPROFEN 800 MG/1
TABLET ORAL
Qty: 30 TABLET | Refills: 0 | Status: SHIPPED | OUTPATIENT
Start: 2019-09-03 | End: 2019-09-16 | Stop reason: SDUPTHER

## 2019-09-16 DIAGNOSIS — M54.2 NECK PAIN: ICD-10-CM

## 2019-09-16 RX ORDER — IBUPROFEN 800 MG/1
TABLET ORAL
Qty: 30 TABLET | Refills: 0 | Status: SHIPPED | OUTPATIENT
Start: 2019-09-16 | End: 2020-01-08 | Stop reason: SDUPTHER

## 2019-11-04 ENCOUNTER — NURSE ONLY (OUTPATIENT)
Dept: FAMILY MEDICINE CLINIC | Age: 59
End: 2019-11-04
Payer: COMMERCIAL

## 2019-11-04 DIAGNOSIS — Z23 NEEDS FLU SHOT: Primary | ICD-10-CM

## 2019-11-04 PROCEDURE — 90471 IMMUNIZATION ADMIN: CPT | Performed by: FAMILY MEDICINE

## 2019-11-04 PROCEDURE — 90686 IIV4 VACC NO PRSV 0.5 ML IM: CPT | Performed by: FAMILY MEDICINE

## 2019-11-20 RX ORDER — BLOOD-GLUCOSE METER
KIT MISCELLANEOUS
Qty: 100 STRIP | Refills: 0 | Status: SHIPPED | OUTPATIENT
Start: 2019-11-20 | End: 2021-11-22 | Stop reason: SDUPTHER

## 2019-12-10 ENCOUNTER — HOSPITAL ENCOUNTER (OUTPATIENT)
Dept: WOMENS IMAGING | Age: 59
Discharge: HOME | End: 2019-12-10
Payer: COMMERCIAL

## 2019-12-10 DIAGNOSIS — Z12.31 VISIT FOR SCREENING MAMMOGRAM: ICD-10-CM

## 2019-12-10 PROCEDURE — 77067 SCR MAMMO BI INCL CAD: CPT

## 2019-12-11 ENCOUNTER — TELEPHONE (OUTPATIENT)
Dept: FAMILY MEDICINE CLINIC | Age: 59
End: 2019-12-11

## 2019-12-19 ENCOUNTER — OFFICE VISIT (OUTPATIENT)
Dept: FAMILY MEDICINE CLINIC | Age: 59
End: 2019-12-19
Payer: COMMERCIAL

## 2019-12-19 VITALS
BODY MASS INDEX: 31.64 KG/M2 | OXYGEN SATURATION: 98 % | DIASTOLIC BLOOD PRESSURE: 58 MMHG | HEART RATE: 85 BPM | WEIGHT: 162 LBS | SYSTOLIC BLOOD PRESSURE: 138 MMHG

## 2019-12-19 DIAGNOSIS — E11.42 DIABETIC POLYNEUROPATHY ASSOCIATED WITH TYPE 2 DIABETES MELLITUS (HCC): ICD-10-CM

## 2019-12-19 DIAGNOSIS — M25.512 LEFT SHOULDER PAIN, UNSPECIFIED CHRONICITY: ICD-10-CM

## 2019-12-19 DIAGNOSIS — E11.00 UNCONTROLLED TYPE 2 DIABETES MELLITUS WITH HYPEROSMOLARITY WITHOUT COMA, WITHOUT LONG-TERM CURRENT USE OF INSULIN (HCC): Primary | ICD-10-CM

## 2019-12-19 LAB — HBA1C MFR BLD: 7.1 %

## 2019-12-19 PROCEDURE — 3045F PR MOST RECENT HEMOGLOBIN A1C LEVEL 7.0-9.0%: CPT | Performed by: FAMILY MEDICINE

## 2019-12-19 PROCEDURE — 2022F DILAT RTA XM EVC RTNOPTHY: CPT | Performed by: FAMILY MEDICINE

## 2019-12-19 PROCEDURE — 3017F COLORECTAL CA SCREEN DOC REV: CPT | Performed by: FAMILY MEDICINE

## 2019-12-19 PROCEDURE — G8482 FLU IMMUNIZE ORDER/ADMIN: HCPCS | Performed by: FAMILY MEDICINE

## 2019-12-19 PROCEDURE — 99214 OFFICE O/P EST MOD 30 MIN: CPT | Performed by: FAMILY MEDICINE

## 2019-12-19 PROCEDURE — 4004F PT TOBACCO SCREEN RCVD TLK: CPT | Performed by: FAMILY MEDICINE

## 2019-12-19 PROCEDURE — 83036 HEMOGLOBIN GLYCOSYLATED A1C: CPT | Performed by: FAMILY MEDICINE

## 2019-12-19 PROCEDURE — G8427 DOCREV CUR MEDS BY ELIG CLIN: HCPCS | Performed by: FAMILY MEDICINE

## 2019-12-19 PROCEDURE — G8417 CALC BMI ABV UP PARAM F/U: HCPCS | Performed by: FAMILY MEDICINE

## 2019-12-19 RX ORDER — LURASIDONE HYDROCHLORIDE 40 MG/1
TABLET, FILM COATED ORAL
COMMUNITY
Start: 2019-10-29 | End: 2021-11-22

## 2020-01-07 ENCOUNTER — TELEPHONE (OUTPATIENT)
Dept: FAMILY MEDICINE CLINIC | Age: 60
End: 2020-01-07

## 2020-01-07 NOTE — TELEPHONE ENCOUNTER
Pt states that she is having pain behind her Lt breast along w a HA. She states that she is seeing a GI for stretching of her esophagus. Denies neck/jaw pain. Scheduled w Mark greenwood.

## 2020-01-08 ENCOUNTER — OFFICE VISIT (OUTPATIENT)
Dept: FAMILY MEDICINE CLINIC | Age: 60
End: 2020-01-08
Payer: COMMERCIAL

## 2020-01-08 VITALS
HEART RATE: 73 BPM | HEIGHT: 60 IN | OXYGEN SATURATION: 98 % | RESPIRATION RATE: 16 BRPM | DIASTOLIC BLOOD PRESSURE: 56 MMHG | TEMPERATURE: 97.8 F | SYSTOLIC BLOOD PRESSURE: 118 MMHG | BODY MASS INDEX: 32.2 KG/M2 | WEIGHT: 164 LBS

## 2020-01-08 PROCEDURE — G8427 DOCREV CUR MEDS BY ELIG CLIN: HCPCS | Performed by: NURSE PRACTITIONER

## 2020-01-08 PROCEDURE — G8482 FLU IMMUNIZE ORDER/ADMIN: HCPCS | Performed by: NURSE PRACTITIONER

## 2020-01-08 PROCEDURE — 4004F PT TOBACCO SCREEN RCVD TLK: CPT | Performed by: NURSE PRACTITIONER

## 2020-01-08 PROCEDURE — G8417 CALC BMI ABV UP PARAM F/U: HCPCS | Performed by: NURSE PRACTITIONER

## 2020-01-08 PROCEDURE — 99213 OFFICE O/P EST LOW 20 MIN: CPT | Performed by: NURSE PRACTITIONER

## 2020-01-08 PROCEDURE — 3017F COLORECTAL CA SCREEN DOC REV: CPT | Performed by: NURSE PRACTITIONER

## 2020-01-08 RX ORDER — BENZONATATE 100 MG/1
100 CAPSULE ORAL 3 TIMES DAILY PRN
Qty: 30 CAPSULE | Refills: 0 | Status: SHIPPED | OUTPATIENT
Start: 2020-01-08 | End: 2020-09-21

## 2020-01-08 RX ORDER — IBUPROFEN 800 MG/1
TABLET ORAL
Qty: 30 TABLET | Refills: 0 | Status: SHIPPED | OUTPATIENT
Start: 2020-01-08 | End: 2020-09-21

## 2020-01-08 ASSESSMENT — ENCOUNTER SYMPTOMS
DIARRHEA: 0
TROUBLE SWALLOWING: 1
COUGH: 1
SHORTNESS OF BREATH: 0
BACK PAIN: 1
NAUSEA: 0

## 2020-01-08 NOTE — PROGRESS NOTES
Subjective:      Patient ID: Daija Sharpe is a 64 y.o. female. HPI     Left back pain starting Monday. Intense then stops. Reoccurs after 1-2 hours. Unable to make occur with movement. Bra makes it worse when pain starts. Has had bad cough and will see GI later today. Thinks something needs stretched. Referred by ENT, Dr. Piper Nunez who she last seen 5/19. Will see Dr. Maged Rome today. Later states cough started 5 weeks ago. Review of Systems   Constitutional: Negative for appetite change, chills and fever. HENT: Positive for trouble swallowing. Respiratory: Positive for cough. Negative for shortness of breath. Gastrointestinal: Negative for diarrhea and nausea. Musculoskeletal: Positive for back pain. Objective:   Physical Exam  Pulmonary:      Breath sounds: No wheezing or rhonchi. Musculoskeletal:      Comments: Indicates area of pain left lower scapula to tiffany 2cm below bra line. Skin:     Findings: No rash. Comments: Neg for rash.            Current Outpatient Medications   Medication Sig Dispense Refill    LATUDA 40 MG TABS tablet       FREESTYLE LITE strip USE DAILY AS NEEDED 100 strip 0    pravastatin (PRAVACHOL) 40 MG tablet TAKE 1 TABLET BY MOUTH EVERY EVENING 30 tablet 5    ibuprofen (ADVIL;MOTRIN) 800 MG tablet TAKE 1 TABLET BY MOUTH TWICE DAILY AS NEEDED FOR PAIN 30 tablet 0    rOPINIRole (REQUIP) 2 MG tablet TAKE 1 TABLET BY MOUTH EVERY NIGHT AT BEDTIME AND IN THE EVENING AS NEEDED 60 tablet 5    BREO ELLIPTA 100-25 MCG/INH AEPB inhaler INHALE 1 PUFF INTO THE LUNGS DAILY 1 each 5    FREESTYLE LANCETS MISC USE DAILY 100 each 5    losartan (COZAAR) 50 MG tablet TAKE 1 TABLET BY MOUTH EVERY DAY 90 tablet 3    loratadine (CLARITIN) 10 MG tablet TAKE 1 TABLET BY MOUTH EVERY DAY 90 tablet 1    albuterol sulfate  (90 Base) MCG/ACT inhaler INHALE 2 PUFFS INTO THE LUNGS EVERY 4 HOURS AS NEEDED FOR WHEEZING 1 Inhaler 5    levothyroxine (SYNTHROID) 100 MCG tablet

## 2020-01-09 ENCOUNTER — TELEPHONE (OUTPATIENT)
Dept: FAMILY MEDICINE CLINIC | Age: 60
End: 2020-01-09

## 2020-01-13 ENCOUNTER — HOSPITAL ENCOUNTER (OUTPATIENT)
Dept: GENERAL RADIOLOGY | Age: 60
Discharge: HOME | End: 2020-01-13
Payer: COMMERCIAL

## 2020-01-13 PROCEDURE — 74220 X-RAY XM ESOPHAGUS 1CNTRST: CPT

## 2020-01-27 RX ORDER — BLOOD-GLUCOSE METER
KIT MISCELLANEOUS
Qty: 50 DEVICE | Refills: 5 | Status: SHIPPED | OUTPATIENT
Start: 2020-01-27 | End: 2020-12-01

## 2020-02-10 ENCOUNTER — OFFICE VISIT (OUTPATIENT)
Dept: PULMONOLOGY | Age: 60
End: 2020-02-10
Payer: COMMERCIAL

## 2020-02-10 VITALS
HEIGHT: 60 IN | BODY MASS INDEX: 31.02 KG/M2 | DIASTOLIC BLOOD PRESSURE: 60 MMHG | TEMPERATURE: 97.7 F | OXYGEN SATURATION: 98 % | SYSTOLIC BLOOD PRESSURE: 114 MMHG | HEART RATE: 71 BPM | RESPIRATION RATE: 16 BRPM | WEIGHT: 158 LBS

## 2020-02-10 PROCEDURE — G8482 FLU IMMUNIZE ORDER/ADMIN: HCPCS | Performed by: INTERNAL MEDICINE

## 2020-02-10 PROCEDURE — 99214 OFFICE O/P EST MOD 30 MIN: CPT | Performed by: INTERNAL MEDICINE

## 2020-02-10 PROCEDURE — 3017F COLORECTAL CA SCREEN DOC REV: CPT | Performed by: INTERNAL MEDICINE

## 2020-02-10 PROCEDURE — G8926 SPIRO NO PERF OR DOC: HCPCS | Performed by: INTERNAL MEDICINE

## 2020-02-10 PROCEDURE — 4004F PT TOBACCO SCREEN RCVD TLK: CPT | Performed by: INTERNAL MEDICINE

## 2020-02-10 PROCEDURE — 3023F SPIROM DOC REV: CPT | Performed by: INTERNAL MEDICINE

## 2020-02-10 PROCEDURE — G8417 CALC BMI ABV UP PARAM F/U: HCPCS | Performed by: INTERNAL MEDICINE

## 2020-02-10 PROCEDURE — G8427 DOCREV CUR MEDS BY ELIG CLIN: HCPCS | Performed by: INTERNAL MEDICINE

## 2020-02-10 NOTE — PROGRESS NOTES
10/5/12  FVC 2.72(88%) FEV1 2.12 (86%) FEV1/FVC ratio  78% TLC 3.82 (83%) DLCO 14.71 (68%)  No BD response  PFT 4/29/14 FEV1 2.07 L 86% TLC 3.69 L (80%) DLCO 13.76 64%  PFT 7/19/18 FEV1 2.03 L 88% TLC 3.91 L 85%   DLCO 12.22 58%    6MWT 1120 feet, low sat 95%    IMAGING:    LDCT 7/31/19  Impression   Mild emphysema.  Tiny punctate pulmonary nodules appear unchanged       Echo 2015  - Left ventricle: The cavity size was normal. Wall thickness  was normal. Systolic function was normal. The estimated  ejection fraction was in the range of 60% to 65%. Wall  motion was normal; there were no regional wall motion  abnormalities. Left ventricular diastolic function  parameters were normal. Pulmonary arteries: PA peak  pressure: 37mm Hg (S).    3/12/17 CXR clear lung fields    ASSESSMENT AND PLAN:  · Pulmonary Centrilobular Emphysema. Working on stopping smoking. Has Breo 100, albuterol MDI and nebs; brings up small volumes mucus daily. Intolerant of Spiriva due to sensation of swelling in throat   · Restless legs syndrome: Requip 2 mg at bedtime and up to 2 mg earlier in day. She is doing well and is stable. Specifically cautioned re: avoiding driving with requip, potential for sleep attack  · Depression: treated [facility], has been long term treated with Venlafaxine   · Tobacco abuse -  1 ppd, using gum in effort to quit -- is really no longer trying to cut down. Greater than 41 pack year history. .  · H/O vocal cord polyp. Excised by Dr. Jf Reynoso in 2018. Benign by report. Needs new referral to Dr. Jf Reynoso. Has h/o vocal cord nodule and now has new symptom of cough, particularly when turning head. 7.  See me back in 6 months with LDCT for LCS     Screening CT scan was considered in a lung cancer screening counseling and shared decision making visit today that included the following elements:   Eligibility: Age: 64. There are no signs or symptoms of lung cancer.   Tobacco History 40 pack-years, quit zero years ago  Verbal counseling has been performed by me to include benefits and harms of screening, follow-up diagnostic testing, over-diagnosis, false positive rate, and total radiation exposure;   I have counseled on the importance of adherence to annual lung cancer LDCT screening, the impact of comorbidities and patient is willing to undergo diagnosis and treatment;   I have provided counseling on the importance of maintaining cigarette smoking abstinence if former smoker; or the importance of smoking cessation if current smoker and, if appropriate, furnishing of information about tobacco cessation interventions; and   I have furnished a written order for lung cancer screening with LDCT. Order for Screening chest CT scan should be placed with documentation as below:  Beneficiary date of birth;    Actual pack - year smoking history (number) from above;   Current smoking status, and for former smokers, the number of years since quitting smoking from above  Beneficiary is asymptomatic   National Provider Identifier (NPI) for Madai Renteria [id number]

## 2020-02-24 RX ORDER — ROPINIROLE 2 MG/1
TABLET, FILM COATED ORAL
Qty: 60 TABLET | Refills: 5 | Status: SHIPPED | OUTPATIENT
Start: 2020-02-24 | End: 2020-08-26

## 2020-03-20 RX ORDER — ROPINIROLE 2 MG/1
TABLET, FILM COATED ORAL
Qty: 60 TABLET | Refills: 5 | OUTPATIENT
Start: 2020-03-20

## 2020-04-20 RX ORDER — LEVOTHYROXINE SODIUM 0.1 MG/1
TABLET ORAL
Qty: 30 TABLET | Refills: 11 | Status: SHIPPED | OUTPATIENT
Start: 2020-04-20 | End: 2021-04-19

## 2020-05-18 RX ORDER — PRAVASTATIN SODIUM 40 MG
TABLET ORAL
Qty: 30 TABLET | Refills: 5 | Status: SHIPPED | OUTPATIENT
Start: 2020-05-18 | End: 2020-09-22 | Stop reason: ALTCHOICE

## 2020-06-09 ENCOUNTER — VIRTUAL VISIT (OUTPATIENT)
Dept: FAMILY MEDICINE CLINIC | Age: 60
End: 2020-06-09
Payer: COMMERCIAL

## 2020-06-09 PROCEDURE — 99214 OFFICE O/P EST MOD 30 MIN: CPT | Performed by: FAMILY MEDICINE

## 2020-06-09 PROCEDURE — 2022F DILAT RTA XM EVC RTNOPTHY: CPT | Performed by: FAMILY MEDICINE

## 2020-06-09 PROCEDURE — 3017F COLORECTAL CA SCREEN DOC REV: CPT | Performed by: FAMILY MEDICINE

## 2020-06-09 PROCEDURE — 3046F HEMOGLOBIN A1C LEVEL >9.0%: CPT | Performed by: FAMILY MEDICINE

## 2020-06-09 PROCEDURE — G8427 DOCREV CUR MEDS BY ELIG CLIN: HCPCS | Performed by: FAMILY MEDICINE

## 2020-06-09 RX ORDER — ALBUTEROL SULFATE 90 UG/1
AEROSOL, METERED RESPIRATORY (INHALATION)
Qty: 1 INHALER | Refills: 5 | Status: SHIPPED | OUTPATIENT
Start: 2020-06-09 | End: 2022-02-10 | Stop reason: SDUPTHER

## 2020-06-09 NOTE — PROGRESS NOTES
2020    TELEHEALTH EVALUATION -- Audio/Visual (During YOBOZ-00 public health emergency)    HPI:    Kim Ingram (:  1960) has requested an audio/video evaluation for the following concern(s):    Recently gained about 14 lbs and feels that she has been having more difficulty breathing. 1. Hypothyroidism, unspecified type  Energy level has been normal.      2. Uncontrolled type 2 diabetes mellitus with hyperosmolarity without coma, without long-term current use of insulin ([facility])  Blood sugars have been about 270. Not taking anything for blood sugars. No polyuria, polydipsia, polyuria. 3. Diabetic polyneuropathy associated with type 2 diabetes mellitus ([address]  Feet have been doing well. No pain    4. COPD:  Using emergency inhaler more, allergiies are acting up with pollen and mold. Notes no cough and production. Review of Systems    Prior to Visit Medications    Medication Sig Taking?  Authorizing Provider   pravastatin (PRAVACHOL) 40 MG tablet TAKE ONE TABLET BY MOUTH EACH EVENING Yes Katy Razo MD   loratadine (CLARITIN) 10 MG tablet TAKE ONE TABLET BY MOUTH ONCE DAILY Yes Katy Razo MD   levothyroxine (SYNTHROID) 100 MCG tablet TAKE ONE TABLET BY MOUTH ONCE DAILY Yes Katy Razo MD   rOPINIRole (REQUIP) 2 MG tablet TAKE ONE TABLET BY MOUTH EVERY NIGHT AT BEDTIME AND IN THE EVENING AS NEEDED Yes Dimitrios Hicks MD   Blood Glucose Monitoring Suppl (FREESTYLE LITE) CONNIE USE DAILY AS NEEDED Yes Katy Razo MD   ibuprofen (ADVIL;MOTRIN) 800 MG tablet TAKE 1 TABLET BY MOUTH TWICE DAILY AS NEEDED FOR PAIN Yes VERONIKA Recinos CNP   benzonatate (TESSALON PERLES) 100 MG capsule Take 1 capsule by mouth 3 times daily as needed for Cough Yes VERONIKA Recinos CNP   LATUDA 40 MG TABS tablet  Yes Historical Provider, MD   FREESTYLE LITE strip USE DAILY AS NEEDED Yes Katy Razo MD   BREO ELLIPTA 100-25 MCG/INH AEPB inhaler INHALE 1 PUFF INTO THE LUNGS DAILY Yes Provider, MD   timolol (BETIMOL) 0.5 % ophthalmic solution 1 drop 2 times daily. Yes Historical Provider, MD   brimonidine (ALPHAGAN P) 0.15 % ophthalmic solution 1 drop 2 times daily.  Yes Historical Provider, MD       Social History     Tobacco Use    Smoking status: Current Every Day Smoker     Packs/day: 1.00     Years: 38.00     Pack years: 38.00     Types: Cigarettes     Start date: 1/1/1978    Smokeless tobacco: Never Used   Substance Use Topics    Alcohol use: No     Alcohol/week: 0.0 standard drinks    Drug use: Not on file        Allergies   Allergen Reactions    Budesonide Anaphylaxis and Swelling     Throat swelled    Tiotropium Bromide Monohydrate Anaphylaxis and Rash    Albuterol Swelling    Cymbalta [Duloxetine Hcl] Other (See Comments) and Swelling     Throat swelled  suicidal  Throat swelling    Duloxetine     Maxalt [Rizatriptan Benzoate]      Does not recall    Meloxicam Other (See Comments)     Unsure of Rx  crazy    Risperidone      Unsure of Rx    Rizatriptan Other (See Comments)     Pt unsure    Spiriva [Tiotropium Bromide Monohydrate] Swelling     Neck swelling    Tiotropium     Topamax      \"didn't work\"    Topiramate Other (See Comments) and Swelling     unsure    Buspirone Nausea And Vomiting   ,   Past Medical History:   Diagnosis Date    Anxiety     B[facility] disorder [facility]     Brain aneurysm     [place]    Closed angle glaucoma     COPD (chronic obstructive pulmonary disease) ([facility]     Hyperlipidemia     Hypertension     Hypothyroidism     Migraines     Open-angle glaucoma of both eyes     RLS (restless legs syndrome)     Rotator cuff disorder     right shoulder    TIA (transient ischemic attack) 5/4/11    Tobacco use disorder     Type II or unspecified type diabetes mellitus without mention of complication, not stated as uncontrolled     diet controlled   ,   Past Surgical History:   Procedure Laterality Date    BLADDER SUSPENSION      CARPAL TUNNEL patient's (and/or legal guardian's) consent, to reduce the patient's risk of exposure to COVID-19 and provide necessary medical care. The patient (and/or legal guardian) has also been advised to contact this office for worsening conditions or problems, and seek emergency medical treatment and/or call 911 if deemed necessary. Patient identification was verified at the start of the visit: Yes    Total time spent on this encounter: Not billed by time    Services were provided through a video synchronous discussion virtually to substitute for in-person clinic visit. Patient and provider were located at their individual homes. --Katy Razo MD on 6/9/2020 at 11:30 AM    An electronic signature was used to authenticate this note.

## 2020-06-29 ENCOUNTER — TELEPHONE (OUTPATIENT)
Dept: PULMONOLOGY | Age: 60
End: 2020-06-29

## 2020-06-29 NOTE — LETTER
[facility] Pulmonary, Critical Care, and Sleep  [address] [facility] [address]  [address] [address]  Phone: [phone]  Fax: [phone]    Brennen Dugan MD        June 29, 2020    Zavala Ebony  [address] [place], [place] [id number]      To Whom It May Concern:    Patient would benefit from a functioning air conditioner due to the Diagnosis of COPD. If you have any questions or concerns, please don't hesitate to call.     Sincerely,        Brennen Dugan MD

## 2020-07-14 RX ORDER — ALBUTEROL SULFATE 2.5 MG/3ML
2.5 SOLUTION RESPIRATORY (INHALATION) EVERY 4 HOURS PRN
Qty: 375 ML | Refills: 5 | Status: SHIPPED | OUTPATIENT
Start: 2020-07-14 | End: 2022-05-12

## 2020-07-16 RX ORDER — LANCETS 28 GAUGE
EACH MISCELLANEOUS
Qty: 100 EACH | Refills: 1 | Status: SHIPPED | OUTPATIENT
Start: 2020-07-16 | End: 2020-12-31

## 2020-08-04 ENCOUNTER — TELEPHONE (OUTPATIENT)
Dept: PULMONOLOGY | Age: 60
End: 2020-08-04

## 2020-08-04 ENCOUNTER — HOSPITAL ENCOUNTER (OUTPATIENT)
Dept: CT IMAGING | Age: 60
Discharge: HOME | End: 2020-08-04
Payer: COMMERCIAL

## 2020-08-04 PROCEDURE — G0297 LDCT FOR LUNG CA SCREEN: HCPCS

## 2020-08-04 NOTE — TELEPHONE ENCOUNTER
Yoshi Salazar with Registration called stating that pt is arrived for CT lung screen but order is more than 3year old. New order is needed. New order created.

## 2020-08-04 NOTE — TELEPHONE ENCOUNTER
Within this Telehealth Consent, the terms you and yours refer to the person using the Telehealth Service (Service), or in the case of a use of the Service by or on behalf of a minor, you and yours refer to and include (i) the parent or legal guardian who provides consent to the use of the Service by such minor or uses the Service on behalf of such minor, and (ii) the minor for whom consent is being provided or on whose behalf the Service is being utilized. When using Service, you will be consulting with your health care providers via the use of Telehealth.   Telehealth involves the delivery of healthcare services using electronic communications, information technology or other means between a healthcare provider and a patient who are not in the same physical location. Telehealth may be used for diagnosis, treatment, follow-up and/or patient education, and may include, but is not limited to, one or more of the following:    Electronic transmission of medical records, photo images, personal health information or other data between a patient and a healthcare provider    Interactions between a patient and healthcare provider via audio, video and/or data communications    Use of output data from medical devices, sound and video files    Anticipated Benefits   The use of Telehealth by your Provider(s) through the Service may have the following possible benefits:    Making it easier and more efficient for you to access medical care and treatment for the conditions treated by such Provider(s) utilizing the Service    Allowing you to obtain medical care and treatment by Provider(s) at times that are convenient for you    Enabling you to interact with Provider(s) without the necessity of an in-office appointment     Possible Risks   While the use of Telehealth can provide potential benefits for you, there are also potential risks associated with the use of Telehealth.  These risks include, but may not be limited to the following:    Your Provider(s) may not able to provide medical treatment for your particular condition and you may be required to seek alternative healthcare or emergency care services.  The electronic systems or other security protocols or safeguards used in the Service could fail, causing a breach of privacy of your medical or other information.  Given regulatory requirements in certain jurisdictions, your Provider(s) diagnosis and/or treatment options, especially pertaining to certain prescriptions, may be limited. Acceptance   1. You understand that Services will be provided via Telehealth. This process involves the use of HIPAA compliant and secure, real-time audio-visual interfacing with a qualified and appropriately trained provider located at [facility]. 2. You understand that, under no circumstances, will this session be recorded. 3. You understand that the Provider(s) at [facility] and other clinical participants will be party to the information obtained during the Telehealth session in accordance with best medical practices. 4. You understand that the information obtained during the Telehealth session will be used to help determine the most appropriate treatment options. 5. You understand that You have the right to revoke this consent at any point in time. 6. You understand that Telehealth is voluntary, and that continued treatment is not dependent upon consent. 7. You understand that, in the event of non-consent to Telehealth services and/or technical difficulties, you will obtain services as typically provided in the absence of Telehealth technology. 8. You understand that this consent will be kept in Your medical record. 9. No potential benefits from the use of Telehealth or specific results can be guaranteed. Your condition may not be cured or improved and, in some cases, may get worse.    10. There are limitations in the provision of medical care and treatment via Telehealth and the Service and you may not be able to receive diagnosis and/or treatment through the Service for every condition for which you seek diagnosis and/or treatment. 11. There are potential risks to the use of Telehealth, including but not limited to the risks described in this Telehealth Consent. 12. Your Provider(s) have discussed the use of Telehealth and the Service with you, including the benefits and risks of such and you have provided oral consent to your Provider(s) for the use of Telehealth and the Service. 15. You understand that it is your duty to provide your Provider(s) truthful, accurate and complete information, including all relevant information regarding care that you may have received or may be receiving from other healthcare providers outside of the Service. 14. You understand that each of your Provider(s) may determine in his or sole discretion that your condition is not suitable for diagnosis and/or treatment using the Service, and that you may need to seek medical care and treatment a specialist or other healthcare provider, outside of the Service. 15. You understand that you are fully responsible for payment for all services provided by Provider(s) or through use of the Service and that you may not be able to use third-party insurance. 16. You represent that (a) you have read this Telehealth Consent carefully, (b) you understand the risks and benefits of the Service and the use of Telehealth in the medical care and treatment provided to you by Provider(s) using the Service, and (c) you have the legal capacity and authority to provide this consent for yourself and/or the minor for which you are consenting under applicable federal and state laws, including laws relating to the age of [de-identified] and/or parental/guardian consent.    17. You give your informed consent to the use of Telehealth by Provider(s) using the Service under the terms described in the Terms of Service and this Telehealth Consent. The patient was read the following statement and has consented to the visit as of 8/4/20. The patient has been scheduled for their first telehealth visit on 8/11/20 with Dr Tammy Qureshi.

## 2020-08-11 ENCOUNTER — VIRTUAL VISIT (OUTPATIENT)
Dept: PULMONOLOGY | Age: 60
End: 2020-08-11
Payer: COMMERCIAL

## 2020-08-11 PROCEDURE — 99214 OFFICE O/P EST MOD 30 MIN: CPT | Performed by: INTERNAL MEDICINE

## 2020-08-11 RX ORDER — IPRATROPIUM BROMIDE AND ALBUTEROL SULFATE 2.5; .5 MG/3ML; MG/3ML
3 SOLUTION RESPIRATORY (INHALATION) EVERY 4 HOURS PRN
Qty: 360 ML | Refills: 5 | Status: SHIPPED | OUTPATIENT
Start: 2020-08-11 | End: 2021-01-25

## 2020-08-11 NOTE — PROGRESS NOTES
CHIEF COMPLAINT:  Restless legs syndrome     HPI:   Interval history:   COPD: using breo. Doing okay. Getting libia horses under ribs over the last 2 months. Happens at least once per day. Cough is getting worse again. Started med for acid reflux per Dr. Petr Barksdale    RLS: uses Requip with benefit, 2 mg morning, 2 mg evening. Dose not changed. No compulsive behavior. No sleep attacks       Presenting history:   The patient is a 51 yo woman with hx of tobacco abuse, brain aneurysm, DM presents with chronic dyspnea on exertion. Patient complains of shortness of breath. Symptoms include dyspnea on exertion. Symptoms began a few years ago, gradually worsening since that time. The dyspnea occurs with moderate activity. Patient denies hemoptysis . Aggravating factors include exertion, exercise and climbing stairs. The patient reports an exercise tolerance of approximately 3 blocks on the flat and 1 flight of stairs, limited primarily by dyspnea and leg pain. She smokes 1 ppd x 32 years. She has used spiriva and advair in the past without benefit. Currently using symbicort and albuterol with some response. Initially followed by Dr. Oskar Vega. reports that she has been smoking cigarettes. She started smoking about 42 years ago. She has a 38.00 pack-year smoking history. She has never used smokeless tobacco.    Objective:   not currently breastfeeding. VIRTUAL  Constitutional:  No acute distress. Appears well developed and nourished. Eyes: EOM intact. Conjunctivae anicteric. No visible discharge. HENT: Head is normocephalic and atraumatic. Mucus membranes are moist and the tongue appears normal. Normal appearing nose. External Ears normal. Neck with no obvious mass and the trachea is midline. Respiratory: No accessory muscle usage. Respiratory effort normal. No visualized signs of difficulty breathing or respiratory distress  Cardiovascular: No obvious peripheral edema.    Skin: No significant exanthematous no regional wall motion  abnormalities. Left ventricular diastolic function  parameters were normal. Pulmonary arteries: PA peak  pressure: 37mm Hg (S).    3/12/17 CXR clear lung fields    ASSESSMENT AND PLAN:  · Pulmonary Centrilobular Emphysema. Working on stopping smoking. Has Breo 100, albuterol MDI and duonebs; brings up small volumes mucus daily. Intolerant of Spiriva due to sensation of swelling in throat. Will try stopping breo for 1 week to make sure it is not causing muscle spasms in rib cage  · Restless legs syndrome: Requip 2 mg at bedtime and up to 2 mg earlier in day. She is doing well and is stable. Previously cautioned re: avoiding driving with requip, potential for sleep attack  · Depression: treated at [facility] in [facility], has been long term treated with Venlafaxine   · Tobacco abuse -  Now up to 1.5 ppd. Greater than 41 pack year history. Pre-contemplative  · H/O vocal cord polyp. Excised by Dr. Sabrina Hancock in 2018. Repeat scope in 2020. · See me back in 6 months  Will schedule LDCT for Aug 2021 at that visit, patient aware.

## 2020-08-18 ENCOUNTER — TELEPHONE (OUTPATIENT)
Dept: PULMONOLOGY | Age: 60
End: 2020-08-18

## 2020-08-18 ENCOUNTER — NURSE TRIAGE (OUTPATIENT)
Dept: OTHER | Facility: CLINIC | Age: 60
End: 2020-08-18

## 2020-08-18 ENCOUNTER — TELEPHONE (OUTPATIENT)
Dept: FAMILY MEDICINE CLINIC | Age: 60
End: 2020-08-18

## 2020-08-18 RX ORDER — IBUPROFEN 800 MG/1
800 TABLET ORAL 2 TIMES DAILY PRN
Qty: 60 TABLET | Refills: 5 | Status: SHIPPED | OUTPATIENT
Start: 2020-08-18 | End: 2020-10-20 | Stop reason: SDUPTHER

## 2020-08-18 NOTE — TELEPHONE ENCOUNTER
Patient wanted Dr Gold Donald to be aware that her libia horse went away. ASSESSMENT AND PLAN: 8/11/20  · Pulmonary Centrilobular Emphysema. Working on stopping smoking. Has Breo 100, albuterol MDI and duonebs; brings up small volumes mucus daily. Intolerant of Spiriva due to sensation of swelling in throat. Will try stopping breo for 1 week to make sure it is not causing muscle spasms in rib cage  · Restless legs syndrome: Requip 2 mg at bedtime and up to 2 mg earlier in day. She is doing well and is stable. Previously cautioned re: avoiding driving with requip, potential for sleep attack  · Depression: treated at [facility], has been long term treated with Venlafaxine   · Tobacco abuse -  Now up to 1.5 ppd. Greater than 41 pack year history. Pre-contemplative  · H/O vocal cord polyp. Excised by Dr. Reji Asif in 2018. Repeat scope in 2020. · See me back in 6 months  Will schedule LDCT for Aug 2021 at that visit, patient aware.

## 2020-08-18 NOTE — TELEPHONE ENCOUNTER
Plan is to try substituting advair HFA one puff BID -- I sent in rx and spoke with pt. VV in 2-4 weeks.

## 2020-08-18 NOTE — TELEPHONE ENCOUNTER
----- Message from Jamie Mcdonald sent at 8/18/2020  2:30 PM EDT -----  Subject: Message to Provider    QUESTIONS  Information for Provider? Pt called and said she had been having a off and   on head and she wants to know if she can get 800 Mg of ibuprofen. Also she   said she she wanted to let her doctor know the insurance company denied   the diabetic medication he gave her.  ---------------------------------------------------------------------------  --------------  REBIScan  What is the best way for the office to contact you? OK to leave message on   voicemail  Preferred Call Back Phone Number? [phone]  ---------------------------------------------------------------------------  --------------  SCRIPT ANSWERS  Relationship to Patient?  Self

## 2020-08-26 RX ORDER — ROPINIROLE 2 MG/1
TABLET, FILM COATED ORAL
Qty: 60 TABLET | Refills: 5 | Status: SHIPPED | OUTPATIENT
Start: 2020-08-26 | End: 2021-03-18

## 2020-08-26 RX ORDER — OMEPRAZOLE 40 MG/1
CAPSULE, DELAYED RELEASE ORAL
Qty: 60 CAPSULE | Refills: 2 | Status: SHIPPED | OUTPATIENT
Start: 2020-08-26 | End: 2020-08-31

## 2020-08-31 ENCOUNTER — TELEPHONE (OUTPATIENT)
Dept: PULMONOLOGY | Age: 60
End: 2020-08-31

## 2020-08-31 RX ORDER — OMEPRAZOLE 40 MG/1
CAPSULE, DELAYED RELEASE ORAL
Qty: 60 CAPSULE | Refills: 2 | Status: SHIPPED | OUTPATIENT
Start: 2020-08-31 | End: 2020-09-30

## 2020-09-01 NOTE — TELEPHONE ENCOUNTER
Patient called with message left for patient to call back to office. WLAIYO:[id number];MTNYSP:[id number]; Review Type:Prior Auth; Coverage Start Date:08/31/2020; Coverage End Date:08/31/2021

## 2020-09-21 ENCOUNTER — OFFICE VISIT (OUTPATIENT)
Dept: FAMILY MEDICINE CLINIC | Age: 60
End: 2020-09-21
Payer: COMMERCIAL

## 2020-09-21 VITALS
SYSTOLIC BLOOD PRESSURE: 132 MMHG | DIASTOLIC BLOOD PRESSURE: 72 MMHG | HEIGHT: 60 IN | TEMPERATURE: 97.5 F | BODY MASS INDEX: 33.81 KG/M2 | OXYGEN SATURATION: 98 % | WEIGHT: 172.2 LBS | HEART RATE: 95 BPM

## 2020-09-21 LAB
A/G RATIO: 1.5 (ref 1.1–2.2)
ALBUMIN SERPL-MCNC: 4.5 G/DL (ref 3.4–5)
ALP BLD-CCNC: 126 U/L (ref 40–129)
ALT SERPL-CCNC: 40 U/L (ref 10–40)
ANION GAP SERPL CALCULATED.3IONS-SCNC: 19 MMOL/L (ref 3–16)
AST SERPL-CCNC: 44 U/L (ref 15–37)
BASOPHILS ABSOLUTE: 0.1 K/UL (ref 0–0.2)
BASOPHILS RELATIVE PERCENT: 1.2 %
BILIRUB SERPL-MCNC: <0.2 MG/DL (ref 0–1)
BUN BLDV-MCNC: 15 MG/DL (ref 7–20)
CALCIUM SERPL-MCNC: 10.6 MG/DL (ref 8.3–10.6)
CHLORIDE BLD-SCNC: 92 MMOL/L (ref 99–110)
CHOLESTEROL, TOTAL: 307 MG/DL (ref 0–199)
CHP ED QC CHECK: 125
CO2: 19 MMOL/L (ref 21–32)
CREAT SERPL-MCNC: 0.7 MG/DL (ref 0.6–1.1)
EOSINOPHILS ABSOLUTE: 0.5 K/UL (ref 0–0.6)
EOSINOPHILS RELATIVE PERCENT: 4.8 %
GFR AFRICAN AMERICAN: >60
GFR NON-AFRICAN AMERICAN: >60
GLOBULIN: 3.1 G/DL
GLUCOSE BLD-MCNC: 125 MG/DL
GLUCOSE BLD-MCNC: 431 MG/DL (ref 70–99)
HBA1C MFR BLD: 11.5 %
HCT VFR BLD CALC: 35.4 % (ref 36–48)
HDLC SERPL-MCNC: 49 MG/DL (ref 40–60)
HEMOGLOBIN: 11.1 G/DL (ref 12–16)
LDL CHOLESTEROL CALCULATED: ABNORMAL MG/DL
LDL CHOLESTEROL DIRECT: 154 MG/DL
LYMPHOCYTES ABSOLUTE: 3.5 K/UL (ref 1–5.1)
LYMPHOCYTES RELATIVE PERCENT: 33.1 %
MCH RBC QN AUTO: 22.3 PG (ref 26–34)
MCHC RBC AUTO-ENTMCNC: 31.3 G/DL (ref 31–36)
MCV RBC AUTO: 71 FL (ref 80–100)
MONOCYTES ABSOLUTE: 0.5 K/UL (ref 0–1.3)
MONOCYTES RELATIVE PERCENT: 4.6 %
NEUTROPHILS ABSOLUTE: 6 K/UL (ref 1.7–7.7)
NEUTROPHILS RELATIVE PERCENT: 56.3 %
PDW BLD-RTO: 18.8 % (ref 12.4–15.4)
PLATELET # BLD: 412 K/UL (ref 135–450)
PMV BLD AUTO: 8 FL (ref 5–10.5)
POTASSIUM SERPL-SCNC: 4.9 MMOL/L (ref 3.5–5.1)
RBC # BLD: 4.98 M/UL (ref 4–5.2)
SODIUM BLD-SCNC: 130 MMOL/L (ref 136–145)
T4 FREE: 0.9 NG/DL (ref 0.9–1.8)
TOTAL PROTEIN: 7.6 G/DL (ref 6.4–8.2)
TRIGL SERPL-MCNC: 869 MG/DL (ref 0–150)
TSH SERPL DL<=0.05 MIU/L-ACNC: 2.35 UIU/ML (ref 0.27–4.2)
VLDLC SERPL CALC-MCNC: ABNORMAL MG/DL
WBC # BLD: 10.7 K/UL (ref 4–11)

## 2020-09-21 PROCEDURE — 3046F HEMOGLOBIN A1C LEVEL >9.0%: CPT | Performed by: NURSE PRACTITIONER

## 2020-09-21 PROCEDURE — 99213 OFFICE O/P EST LOW 20 MIN: CPT | Performed by: NURSE PRACTITIONER

## 2020-09-21 PROCEDURE — 4004F PT TOBACCO SCREEN RCVD TLK: CPT | Performed by: NURSE PRACTITIONER

## 2020-09-21 PROCEDURE — G8431 POS CLIN DEPRES SCRN F/U DOC: HCPCS | Performed by: NURSE PRACTITIONER

## 2020-09-21 PROCEDURE — 83036 HEMOGLOBIN GLYCOSYLATED A1C: CPT | Performed by: NURSE PRACTITIONER

## 2020-09-21 PROCEDURE — 36415 COLL VENOUS BLD VENIPUNCTURE: CPT | Performed by: NURSE PRACTITIONER

## 2020-09-21 PROCEDURE — 82962 GLUCOSE BLOOD TEST: CPT | Performed by: NURSE PRACTITIONER

## 2020-09-21 PROCEDURE — 2022F DILAT RTA XM EVC RTNOPTHY: CPT | Performed by: NURSE PRACTITIONER

## 2020-09-21 PROCEDURE — G8427 DOCREV CUR MEDS BY ELIG CLIN: HCPCS | Performed by: NURSE PRACTITIONER

## 2020-09-21 PROCEDURE — G8417 CALC BMI ABV UP PARAM F/U: HCPCS | Performed by: NURSE PRACTITIONER

## 2020-09-21 PROCEDURE — 3017F COLORECTAL CA SCREEN DOC REV: CPT | Performed by: NURSE PRACTITIONER

## 2020-09-21 RX ORDER — METFORMIN HYDROCHLORIDE 500 MG/1
TABLET, EXTENDED RELEASE ORAL
Qty: 30 TABLET | Refills: 0 | Status: SHIPPED | OUTPATIENT
Start: 2020-09-21 | End: 2020-10-19

## 2020-09-21 ASSESSMENT — PATIENT HEALTH QUESTIONNAIRE - PHQ9
3. TROUBLE FALLING OR STAYING ASLEEP: 3
7. TROUBLE CONCENTRATING ON THINGS, SUCH AS READING THE NEWSPAPER OR WATCHING TELEVISION: 0
5. POOR APPETITE OR OVEREATING: 2
SUM OF ALL RESPONSES TO PHQ9 QUESTIONS 1 & 2: 3
9. THOUGHTS THAT YOU WOULD BE BETTER OFF DEAD, OR OF HURTING YOURSELF: 0
6. FEELING BAD ABOUT YOURSELF - OR THAT YOU ARE A FAILURE OR HAVE LET YOURSELF OR YOUR FAMILY DOWN: 0
2. FEELING DOWN, DEPRESSED OR HOPELESS: 0
SUM OF ALL RESPONSES TO PHQ QUESTIONS 1-9: 13
4. FEELING TIRED OR HAVING LITTLE ENERGY: 3
8. MOVING OR SPEAKING SO SLOWLY THAT OTHER PEOPLE COULD HAVE NOTICED. OR THE OPPOSITE, BEING SO FIGETY OR RESTLESS THAT YOU HAVE BEEN MOVING AROUND A LOT MORE THAN USUAL: 2
SUM OF ALL RESPONSES TO PHQ QUESTIONS 1-9: 13
10. IF YOU CHECKED OFF ANY PROBLEMS, HOW DIFFICULT HAVE THESE PROBLEMS MADE IT FOR YOU TO DO YOUR WORK, TAKE CARE OF THINGS AT HOME, OR GET ALONG WITH OTHER PEOPLE: 0
1. LITTLE INTEREST OR PLEASURE IN DOING THINGS: 3

## 2020-09-21 ASSESSMENT — COLUMBIA-SUICIDE SEVERITY RATING SCALE - C-SSRS
6. HAVE YOU EVER DONE ANYTHING, STARTED TO DO ANYTHING, OR PREPARED TO DO ANYTHING TO END YOUR LIFE?: NO
1. WITHIN THE PAST MONTH, HAVE YOU WISHED YOU WERE DEAD OR WISHED YOU COULD GO TO SLEEP AND NOT WAKE UP?: NO
2. HAVE YOU ACTUALLY HAD ANY THOUGHTS OF KILLING YOURSELF?: NO

## 2020-09-21 NOTE — PROGRESS NOTES
Patient: Gabby Mott is a 64 y.o. female who presents today with the following Chief Complaint(s):  Chief Complaint   Patient presents with    Diabetes         HPI-this is a 28-year-old female patient here with uncontrolled diabetes. She states that her blood sugars have been out of control and that she is not been feeling well over the weekend. She checked her blood sugar was 416 this a.m. we checked it here in the office with our glucometer it was 125. We also checked a hemoglobin A1c it was 11.5. She is currently not taking any medication for this as they tried putting her on far [place] and it was denied and she never called the office back. We discussed options for her I asked about metformin and she states that it upsets her stomach I asked her to please let us try this again every other day starting out to see if this would help with her blood sugars as this will be covered per her insurance. I also meant starting her on a DPP-4 Januvia for control of her blood sugars. She was instructed to take her blood sugar twice daily in the a.m. and at night. I also gave her a tracking record sheet and she is to call in a couple weeks with her blood sugars. She also has a history of hypertension, dyslipidemia, hypothyroidism and I will be getting labs drawn today. These are all stable as of today. She is fasting so we will draw labs and go forward from those results. She is flagging for a positive depression screening but she sees a therapist and he prescribes her medication.   Current Outpatient Medications   Medication Sig Dispense Refill    metFORMIN (GLUCOPHAGE-XR) 500 MG extended release tablet Take 1 tablet by mouth every other day 30 tablet 0    SITagliptin (JANUVIA) 100 MG tablet Take 1 tablet by mouth daily 30 tablet 0    omeprazole (PRILOSEC) 40 MG delayed release capsule TAKE ONE CAPSULE BY MOUTH TWICE A DAY  60 capsule 2    rOPINIRole (REQUIP) 2 MG tablet TAKE ONE TABLET BY MOUTH EVERY Cervical: No cervical adenopathy. Skin:     General: Skin is warm and dry. Neurological:      General: No focal deficit present. Mental Status: She is alert and oriented to person, place, and time. Psychiatric:         Behavior: Behavior normal.         Thought Content: Thought content normal.         Judgment: Judgment normal.       Vitals:    09/21/20 0938   BP: 132/72   Pulse: 95   Temp: 97.5 °F (36.4 °C)   SpO2: 98%       Assessment:  Encounter Diagnoses   Name Primary?  Uncontrolled type 2 diabetes mellitus with hyperosmolarity without coma, without long-term current use of insulin (HCC) Yes    HTN (hypertension), benign     Dyslipidemia     Acquired hypothyroidism     Positive depression screening        Controlled SubstancesMonitoring:  NA    Plan:  1. Uncontrolled type 2 diabetes mellitus with hyperosmolarity without coma, without long-term current use of insulin (HCC)  Uncontrolled at present time  - POCT glycosylated hemoglobin (Hb A1C)  - POCT Glucose  - metFORMIN (GLUCOPHAGE-XR) 500 MG extended release tablet; Take 1 tablet by mouth every other day  Dispense: 30 tablet; Refill: 0  - SITagliptin (JANUVIA) 100 MG tablet; Take 1 tablet by mouth daily  Dispense: 30 tablet; Refill: 0  - CBC Auto Differential  - Comprehensive Metabolic Panel  Check blood sugars twice daily a.m. and p.m. Blood sugar record given call in 2 weeks with blood sugar results    2. HTN (hypertension), benign  Stable  - CBC Auto Differential  - Comprehensive Metabolic Panel    3. Dyslipidemia  Stable  - Lipid Panel    4. Acquired hypothyroidism  Stable  - TSH without Reflex  - T4, Free    5. Positive depression screening  She is seen by a therapist in which he manages her medication. Follow-up at next scheduled appointment  - Positive Screen for Clinical Depression with a Documented Follow-up Plan       JEREMY Munroe    Reviewed treatment plan with patient.   Patient verbalized understanding to treatment plan and questions were answered. [address] [facility] [address]yes.  [place], [address]

## 2020-09-22 RX ORDER — EZETIMIBE 10 MG/1
10 TABLET ORAL DAILY
Qty: 30 TABLET | Refills: 0 | Status: SHIPPED | OUTPATIENT
Start: 2020-09-22 | End: 2020-10-19

## 2020-09-22 RX ORDER — ATORVASTATIN CALCIUM 40 MG/1
40 TABLET, FILM COATED ORAL DAILY
Qty: 90 TABLET | Refills: 1 | Status: SHIPPED | OUTPATIENT
Start: 2020-09-22 | End: 2021-02-09

## 2020-09-24 ENCOUNTER — VIRTUAL VISIT (OUTPATIENT)
Dept: PULMONOLOGY | Age: 60
End: 2020-09-24
Payer: COMMERCIAL

## 2020-09-24 PROCEDURE — 99214 OFFICE O/P EST MOD 30 MIN: CPT | Performed by: INTERNAL MEDICINE

## 2020-09-24 RX ORDER — ESTRADIOL 1 MG/1
TABLET ORAL
COMMUNITY
Start: 2020-08-12

## 2020-09-24 NOTE — PROGRESS NOTES
CHIEF COMPLAINT:  Restless legs syndrome     HPI:   Interval history:   COPD: feels like inhaler is doing fine. Still having muscle spasms. Just started exercise program.  Saw Dr. Ezekiel Hines. RLS: uses Requip with benefit, 2 mg morning, 2 mg evening. Dose not changed. No compulsive behavior. No sleep attacks. Legs big issue if not taken twice daily      Presenting history:   The patient is a 49 yo woman with hx of tobacco abuse, brain aneurysm, DM presents with chronic dyspnea on exertion. Patient complains of shortness of breath. Symptoms include dyspnea on exertion. Symptoms began a few years ago, gradually worsening since that time. The dyspnea occurs with moderate activity. Patient denies hemoptysis . Aggravating factors include exertion, exercise and climbing stairs. The patient reports an exercise tolerance of approximately 3 blocks on the flat and 1 flight of stairs, limited primarily by dyspnea and leg pain. She smokes 1 ppd x 32 years. She has used spiriva and advair in the past without benefit. Currently using symbicort and albuterol with some response. Initially followed by Dr. Alyssa Valentine. reports that she has been smoking cigarettes. She started smoking about 42 years ago. She has a 58.50 pack-year smoking history. She has never used smokeless tobacco.    Objective:   not currently breastfeeding. VIRTUAL  Constitutional:  No acute distress. Appears well developed and nourished. Eyes: EOM intact. Conjunctivae anicteric. No visible discharge. HENT: Head is normocephalic and atraumatic. Mucus membranes are moist and the tongue appears normal. Normal appearing nose. External Ears normal. Neck with no obvious mass and the trachea is midline. Respiratory: No accessory muscle usage. Respiratory effort normal. No visualized signs of difficulty breathing or respiratory distress  Cardiovascular: No obvious peripheral edema.    Skin: No significant exanthematous lesions or discoloration noted on facial for lung cancer screening with LDCT.       Order for Screening chest CT scan should be placed with documentation as below:   Beneficiary date of birth;    Actual pack - year smoking history (number) from above;    Current smoking status, and for former smokers, the number of years since quitting smoking from above  [facility] is asymptomatic   Advanced Brain Monitoring ([employer]) for [place]isen[place] [id number]

## 2020-09-30 ENCOUNTER — TELEPHONE (OUTPATIENT)
Dept: FAMILY MEDICINE CLINIC | Age: 60
End: 2020-09-30

## 2020-09-30 RX ORDER — GLIPIZIDE 5 MG/1
5 TABLET, FILM COATED, EXTENDED RELEASE ORAL DAILY
Qty: 30 TABLET | Refills: 3 | Status: SHIPPED | OUTPATIENT
Start: 2020-09-30 | End: 2020-12-09

## 2020-09-30 RX ORDER — OMEPRAZOLE 40 MG/1
CAPSULE, DELAYED RELEASE ORAL
Qty: 60 CAPSULE | Refills: 2 | Status: SHIPPED | OUTPATIENT
Start: 2020-09-30 | End: 2020-10-19

## 2020-09-30 NOTE — TELEPHONE ENCOUNTER
Patient called with sugar readings. AM               PM  9/22- -   308              380  9/23-      337             426   9/24-      332             404  9/25       274             469  9/26       284             384  9/27       292             383  9/28       280             392  9/29       303             393  9/30       321      The patient would like to know if she should take the metformin every day ?     Please Advise    Orin Daugherty [phone]

## 2020-10-13 ENCOUNTER — TELEPHONE (OUTPATIENT)
Dept: FAMILY MEDICINE CLINIC | Age: 60
End: 2020-10-13

## 2020-10-19 ENCOUNTER — OFFICE VISIT (OUTPATIENT)
Dept: FAMILY MEDICINE CLINIC | Age: 60
End: 2020-10-19
Payer: COMMERCIAL

## 2020-10-19 VITALS
WEIGHT: 171.6 LBS | SYSTOLIC BLOOD PRESSURE: 138 MMHG | TEMPERATURE: 97.1 F | HEART RATE: 96 BPM | OXYGEN SATURATION: 97 % | BODY MASS INDEX: 33.51 KG/M2 | DIASTOLIC BLOOD PRESSURE: 74 MMHG

## 2020-10-19 PROCEDURE — 2022F DILAT RTA XM EVC RTNOPTHY: CPT | Performed by: NURSE PRACTITIONER

## 2020-10-19 PROCEDURE — G8417 CALC BMI ABV UP PARAM F/U: HCPCS | Performed by: NURSE PRACTITIONER

## 2020-10-19 PROCEDURE — G8484 FLU IMMUNIZE NO ADMIN: HCPCS | Performed by: NURSE PRACTITIONER

## 2020-10-19 PROCEDURE — 4004F PT TOBACCO SCREEN RCVD TLK: CPT | Performed by: NURSE PRACTITIONER

## 2020-10-19 PROCEDURE — G8427 DOCREV CUR MEDS BY ELIG CLIN: HCPCS | Performed by: NURSE PRACTITIONER

## 2020-10-19 PROCEDURE — 3046F HEMOGLOBIN A1C LEVEL >9.0%: CPT | Performed by: NURSE PRACTITIONER

## 2020-10-19 PROCEDURE — 99213 OFFICE O/P EST LOW 20 MIN: CPT | Performed by: NURSE PRACTITIONER

## 2020-10-19 PROCEDURE — 3017F COLORECTAL CA SCREEN DOC REV: CPT | Performed by: NURSE PRACTITIONER

## 2020-10-19 RX ORDER — METFORMIN HYDROCHLORIDE 500 MG/1
TABLET, EXTENDED RELEASE ORAL
Qty: 60 TABLET | Refills: 1 | Status: SHIPPED | OUTPATIENT
Start: 2020-10-19 | End: 2020-11-16

## 2020-10-19 NOTE — PROGRESS NOTES
Patient: Grace Jenkins is a 64 y.o. female who presents today with the following Chief Complaint(s):  Chief Complaint   Patient presents with    1 Month Follow-Up     Diabetes         HPI-this is a 62-year-old female patient following up today after I saw her 1 month ago for her diabetes. Her sugars have come down slightly after restarting the Metformin that she was not taking but they still are averaging around 300. I am referring her to Dr. Salena Wade. She had a blood sugar record that she handed to me and we are copying that in her chart. It also showed what her diet intake is which was noted she is eating a lot of egg rolls. Education was given to her regarding t her diet. She did express concern over her blood sugars and she stated she just does not know why they are so high but after looking at her diet I have a sneaky suspicion about this. She currently is only taking metformin  mg in the morning I am going to be increasing this to twice a day. She is unable to take the Januvia due to insurance reasons so I again I am referring her to endocrinology. I did review her most recent labs with her and we discussed her abnormals as far as her lipid panel goes and the CMP. She states she is taking her statin and Zetia. Her cholesterol remains elevated at 307 again I think her diet needs to be adjusted. Triglycerides are elevated due to the sugar content being uncontrolled at 869. CMP shows no kidney involvement yet were trying to avoid this and I explained this to her  She is a smoker she currently admits to smoking 1 pack/day counseling was given for her to quit and if she cannot quit to at least cut down. She states that she has tried to quit in the past with patches but with no success. She does admit also to chewing the gum and that helps cut down on her smoking.   She does report shortness of breath but again this is probably due to the smoking and a diagnosis of emphysema and COPD  Current Outpatient Medications   Medication Sig Dispense Refill    ezetimibe (ZETIA) 10 MG tablet TAKE ONE (1) TABLET BY MOUTH DAILY  30 tablet 5    B Complex Vitamins (VITAMIN B COMPLEX PO) Take by mouth      Ascorbic Acid (KARTIK-C PO) Take by mouth      metFORMIN (GLUCOPHAGE-XR) 500 MG extended release tablet Take 1 tablet twice daily by mouth 60 tablet 1    glipiZIDE (GLUCOTROL XL) 5 MG extended release tablet Take 1 tablet by mouth daily 30 tablet 3    estradiol (ESTRACE) 1 MG tablet       atorvastatin (LIPITOR) 40 MG tablet Take 1 tablet by mouth daily 90 tablet 1    rOPINIRole (REQUIP) 2 MG tablet TAKE ONE TABLET BY MOUTH EVERY NIGHT AT BEDTIME AND IN THE EVENING AS NEEDED  60 tablet 5    ibuprofen (ADVIL;MOTRIN) 800 MG tablet Take 1 tablet by mouth 2 times daily as needed for Pain 60 tablet 5    ipratropium-albuterol (DUONEB) 0.5-2.5 (3) MG/3ML SOLN nebulizer solution Inhale 3 mLs into the lungs every 4 hours as needed for Shortness of Breath 360 mL 5    FreeStyle Lancets MISC USE ONCE DAILY  100 each 1    albuterol (PROVENTIL) (2.5 MG/3ML) 0.083% nebulizer solution Take 3 mLs by nebulization every 4 hours as needed for Wheezing 375 mL 5    albuterol sulfate  (90 Base) MCG/ACT inhaler INHALE 2 PUFFS INTO THE LUNGS EVERY 4 HOURS AS NEEDED FOR WHEEZING 1 Inhaler 5    loratadine (CLARITIN) 10 MG tablet TAKE ONE TABLET BY MOUTH ONCE DAILY 30 tablet 11    levothyroxine (SYNTHROID) 100 MCG tablet TAKE ONE TABLET BY MOUTH ONCE DAILY 30 tablet 11    Blood Glucose Monitoring Suppl (FREESTYLE LITE) CONNIE USE DAILY AS NEEDED 50 Device 5    LATUDA 40 MG TABS tablet       FREESTYLE LITE strip USE DAILY AS NEEDED 100 strip 0    Menthol, Topical Analgesic, 4 % GEL Apply 1 Dose topically 3 times daily To the affected area- the neck 74 mL 3    HYDROcodone-acetaminophen (NORCO) 5-325 MG per tablet       venlafaxine (EFFEXOR XR) 150 MG extended release capsule       diazepam (VALIUM) 5 MG tablet Take 5 mg by mouth daily as needed for Anxiety      fluticasone (FLONASE) 50 MCG/ACT nasal spray 1 spray by Nasal route daily 1 Bottle 3    ESTROGENS CONJUGATED PO Take by mouth      venlafaxine (EFFEXOR-XR) 75 MG XR capsule   Take 150 mg by mouth See Admin Instructions 150mg in morning, 75mg at night      lamotrigine (LAMICTAL) 100 MG tablet Take 100 mg by mouth 2 times daily.  timolol (BETIMOL) 0.5 % ophthalmic solution 1 drop 2 times daily.  brimonidine (ALPHAGAN P) 0.15 % ophthalmic solution 1 drop 2 times daily.  omeprazole (PRILOSEC) 40 MG delayed release capsule TAKE ONE CAPSULE BY MOUTH TWICE A DAY  (Patient not taking: Reported on 10/19/2020) 60 capsule 2    SITagliptin (JANUVIA) 100 MG tablet Take 1 tablet by mouth daily (Patient not taking: Reported on 9/24/2020) 30 tablet 0    fluticasone-salmeterol (ADVAIR HFA) 115-21 MCG/ACT inhaler Inhale 1 puff into the lungs 2 times daily 1 Inhaler 5    losartan (COZAAR) 50 MG tablet TAKE 1 TABLET BY MOUTH EVERY DAY (Patient not taking: Reported on 10/19/2020) 90 tablet 3    lisinopril (PRINIVIL;ZESTRIL) 10 MG tablet Take 10 mg by mouth daily      amLODIPine (NORVASC) 5 MG tablet Take 1 tablet by mouth daily (Patient not taking: Reported on 9/24/2020) 30 tablet 5     No current facility-administered medications for this visit. Patient's past medical history, surgical history, family history, medications,  and allergies  were all reviewed and updated as appropriate today. Review of Systems   Endocrine: Positive for polydipsia, polyphagia and polyuria. All other systems reviewed and are negative. Physical Exam  Vitals signs and nursing note reviewed. Constitutional:       Appearance: She is well-developed. HENT:      Head: Normocephalic. Nose: Nose normal.   Eyes:      Conjunctiva/sclera: Conjunctivae normal.   Neck:      Musculoskeletal: Normal range of motion and neck supple. Thyroid: No thyromegaly.    Cardiovascular: Rate and Rhythm: Normal rate and regular rhythm. Heart sounds: Normal heart sounds. Pulmonary:      Effort: Pulmonary effort is normal.      Breath sounds: Normal breath sounds. Abdominal:      General: Bowel sounds are normal.      Palpations: Abdomen is soft. Musculoskeletal: Normal range of motion. Skin:     General: Skin is warm and dry. Neurological:      Mental Status: She is alert and oriented to person, place, and time. Psychiatric:         Mood and Affect: Mood normal.         Behavior: Behavior normal.         Thought Content: Thought content normal.         Judgment: Judgment normal.       Vitals:    10/19/20 0947   BP: 138/74   Pulse: 96   Temp: 97.1 °F (36.2 °C)   SpO2: 97%       Assessment:  Encounter Diagnoses   Name Primary?  Uncontrolled type 2 diabetes mellitus with hyperosmolarity without coma, without long-term current use of insulin ([facility] [address]     Tobacco abuse counseling Yes       Controlled SubstancesMonitoring:  NA    Plan:  1. Uncontrolled type 2 diabetes mellitus with hyperosmolarity without coma, without long-term current use of insulin ([facility])  Uncontrolled  - metFORMIN (GLUCOPHAGE-XR) 500 MG extended release tablet; Take 1 tablet twice daily by mouth  Dispense: 60 tablet; Refill: 1  - Vitaly Pinto MD, Endocrinology, Jarod Canseco was told to schedule ASAP    2. Tobacco abuse counseling  I spent approximately 10 minutes counseling her about quitting smoking or at least cutting down. She verbalized understanding of this      JEREMY Rodriguez    Reviewed treatment plan with patient. Patient verbalized understanding to treatment plan and questions were answered. [address] [facility] Monica Sanchez.  [place], [address] [place] [address]

## 2020-10-20 RX ORDER — IBUPROFEN 800 MG/1
800 TABLET ORAL 2 TIMES DAILY PRN
Qty: 60 TABLET | Refills: 5 | Status: SHIPPED | OUTPATIENT
Start: 2020-10-20 | End: 2021-03-09

## 2020-10-20 NOTE — TELEPHONE ENCOUNTER
Call from patient requesting refill on  Ibuprofen 800 mg    Send to [facility]    Last office visit:  10.19.20 [facility]    Patient also requesting her recent lab work faxed to [facility], [place]  Fax # [phone]

## 2020-11-16 RX ORDER — METFORMIN HYDROCHLORIDE 500 MG/1
TABLET, EXTENDED RELEASE ORAL
Qty: 60 TABLET | Refills: 1 | Status: SHIPPED | OUTPATIENT
Start: 2020-11-16 | End: 2021-01-12

## 2020-12-01 RX ORDER — BLOOD-GLUCOSE METER
KIT MISCELLANEOUS
Qty: 50 EACH | Refills: 5 | Status: SHIPPED | OUTPATIENT
Start: 2020-12-01 | End: 2022-09-06

## 2020-12-09 ENCOUNTER — OFFICE VISIT (OUTPATIENT)
Dept: ENDOCRINOLOGY | Age: 60
End: 2020-12-09
Payer: COMMERCIAL

## 2020-12-09 ENCOUNTER — TELEPHONE (OUTPATIENT)
Dept: ENDOCRINOLOGY | Age: 60
End: 2020-12-09

## 2020-12-09 VITALS
HEIGHT: 60 IN | HEART RATE: 95 BPM | BODY MASS INDEX: 33.57 KG/M2 | WEIGHT: 171 LBS | OXYGEN SATURATION: 96 % | SYSTOLIC BLOOD PRESSURE: 118 MMHG | DIASTOLIC BLOOD PRESSURE: 78 MMHG | RESPIRATION RATE: 14 BRPM

## 2020-12-09 PROBLEM — E66.9 OBESITY: Status: ACTIVE | Noted: 2020-12-09

## 2020-12-09 LAB — HBA1C MFR BLD: 11.9 %

## 2020-12-09 PROCEDURE — 3046F HEMOGLOBIN A1C LEVEL >9.0%: CPT | Performed by: NURSE PRACTITIONER

## 2020-12-09 PROCEDURE — 83036 HEMOGLOBIN GLYCOSYLATED A1C: CPT | Performed by: NURSE PRACTITIONER

## 2020-12-09 PROCEDURE — 3017F COLORECTAL CA SCREEN DOC REV: CPT | Performed by: NURSE PRACTITIONER

## 2020-12-09 PROCEDURE — 4004F PT TOBACCO SCREEN RCVD TLK: CPT | Performed by: NURSE PRACTITIONER

## 2020-12-09 PROCEDURE — G8427 DOCREV CUR MEDS BY ELIG CLIN: HCPCS | Performed by: NURSE PRACTITIONER

## 2020-12-09 PROCEDURE — G8417 CALC BMI ABV UP PARAM F/U: HCPCS | Performed by: NURSE PRACTITIONER

## 2020-12-09 PROCEDURE — 2022F DILAT RTA XM EVC RTNOPTHY: CPT | Performed by: NURSE PRACTITIONER

## 2020-12-09 PROCEDURE — G8484 FLU IMMUNIZE NO ADMIN: HCPCS | Performed by: NURSE PRACTITIONER

## 2020-12-09 PROCEDURE — 99204 OFFICE O/P NEW MOD 45 MIN: CPT | Performed by: NURSE PRACTITIONER

## 2020-12-09 RX ORDER — INSULIN GLARGINE 100 [IU]/ML
15 INJECTION, SOLUTION SUBCUTANEOUS NIGHTLY
Qty: 5 PEN | Refills: 3 | Status: SHIPPED | OUTPATIENT
Start: 2020-12-09 | End: 2021-01-26 | Stop reason: DRUGHIGH

## 2020-12-09 NOTE — PROGRESS NOTES
Endocrinology  [facility] [place], LAURYN, [address]  [place], [address]  Phone [phone]  Fax [phone]    Jenn Hu is a 64 y.o. female who is a new patient following up for management of Diabetes Mellitus Type 2. PCP: Yvonne Sheikh MD    Last A1C:   Lab Results   Component Value Date    LABA1C 11.9 12/09/2020    LABA1C 11.5 09/21/2020    LABA1C 7.1 12/19/2019     Last BP Readings:  BP Readings from Last 3 Encounters:   12/09/20 118/78   10/19/20 138/74   09/21/20 132/72     Last LDL:   Lab Results   Component Value Date    LDLCALC see below 09/21/2020    LDLDIRECT 154 (H) 09/21/2020     Aspirin Use: no    Tobacco/Alcohol History:   Tobacco Use    Smoking status: Current Every Day Smoker     Packs/day: 1.50     Years: 39.00     Pack years: 58.50     Types: Cigarettes     Start date: 1/1/1978    Smokeless tobacco: Never Used   Substance and Sexual Activity    Alcohol use: No     Alcohol/week: 0.0 standard drinks    Drug use: Not on file     Diabetes:  Luis M Clement  was diagnosed with diabetes type 2 in : > 8   On insulin: none   Patient reports that disease course has been variable and is currently poorly controlled   Current microvascular complications include peripheral neuropathy   Current Macrovascular complications there is no history of  CVD, CAD, PVD   Patient reports compliance for about 30 % of the time and adheres to medication, but usually not to diet and exercise instructions.  There have been no recent hospital or ED admissions for hypoglycemia, hyperglycemia or DKA.   Tammy Her Other ED visit include for   PMH includes  Past Medical History:   Diagnosis Date    Anxiety     Bipolar disorder ([facility] [address]     Brain aneurysm     [place]    Closed angle glaucoma     COPD (chronic obstructive pulmonary disease) ([facility] [address]     Hyperlipidemia     Hypertension     Hypothyroidism     Migraines     Open-angle glaucoma of both eyes     RLS (restless legs syndrome)     Rotator cuff disorder     right shoulder    TIA (transient ischemic attack) 5/4/11    Tobacco use disorder     Type II or unspecified type diabetes mellitus without mention of complication, not stated as uncontrolled     diet controlled       Blood glucose trends:  Patient brought log glucometer for download  Readings per day  0 per patient report  BG Range: n/a  Hypoglycemia awareness and symptoms: none recently  Has not done CGM    Current Medication regimen:   Metformin 500 mg  twice daily with meals  Other meds tried in past: does not recall     GFR  > 60    Current Dietary regimen:   BF : skips, eggs, minimuffins  Lunch: skips , egg rolls,   Dinner: fish, chicken,   Snacks: rarely  Beverages: regular soda 3 cans a day,   Average carbs per day    Microvascular Complications:  · Neuropathy: painful, occasional shooting pains, RLS  · Retinopathy: no concerns with vision, field of vision  · Nephropathy: no microalbuminuria no recent MACR      Hyperlipidemia: Current complaints include occasional myalgias but otherwise tolerates well. Lab Results   Component Value Date    CHOL 307 09/21/2020    CHOL 225 05/12/2017    CHOL 283 10/05/2016     Lab Results   Component Value Date    TRIG 869 09/21/2020    TRIG 352 05/12/2017    TRIG 342 10/05/2016     Lab Results   Component Value Date    HDL 49 09/21/2020    HDL 71 08/21/2019    HDL 47 02/14/2018    HDL 62 12/27/2010     Lab Results   Component Value Date    LDLCALC see below 09/21/2020    LDLCALC 110 08/21/2019    LDLCALC see below 02/14/2018     Lab Results   Component Value Date    LDLDIRECT 154 09/21/2020    LDLDIRECT 117 02/14/2018    LDLDIRECT 119 05/12/2017     No results found for: BLADE    Vitamin D deficiency: Currently is on no supplementation. Current complaints include fatigue on daily basis.    Last vitamin Dlevel is:  Lab Results   Component Value Date    VITD25 19.8 02/14/2018    VITD25 18.5 10/05/2016    VITD25 16.6 04/26/2016 Hypertension  Controlled , denies symptoms of dizziness, light headedness. Occasional dependent edema. Tries to follow a salt restricted diet. Lab Results   Component Value Date     (L) 09/21/2020    K 4.9 09/21/2020    CL 92 (L) 09/21/2020    CO2 19 (L) 09/21/2020    BUN 15 09/21/2020    CREATININE 0.7 09/21/2020    GLUCOSE 431 (H) 09/21/2020    CALCIUM 10.6 09/21/2020    PROT 7.6 09/21/2020    LABALBU 4.5 09/21/2020    BILITOT <0.2 09/21/2020    ALKPHOS 126 09/21/2020    AST 44 (H) 09/21/2020    ALT 40 09/21/2020    LABGLOM >60 09/21/2020    GFRAA >60 09/21/2020    AGRATIO 1.5 09/21/2020    GLOB 3.1 09/21/2020     The 10-year ASCVD risk score (Marcia Prado et al., 2013) is: 19.7%    Values used to calculate the score:      Age: 64 years      Sex: Female      Is Non- : No      Diabetic: Yes      Tobacco smoker: Yes      Systolic Blood Pressure: 356 mmHg      Is BP treated: Yes      HDL Cholesterol: 49 mg/dL      Total Cholesterol: 307 mg/dL    Family History   Problem Relation Age of Onset    Cancer Mother         colon; lung    Emphysema Mother     Hypertension Mother     Heart Disease Father     Heart Failure Father     Bipolar Disorder Sister     Osteoporosis Sister     Asthma Neg Hx     Diabetes Neg Hx      Review of Systems   Constitutional: Negative for activity change, appetite change, diaphoresis, fever and unexpected weight change. HENT: Negative for dental problem. Eyes: Negative for pain and visual disturbance. Respiratory: Negative for shortness of breath. Cardiovascular: Negative for chest pain, palpitations and leg swelling. Gastrointestinal: Negative for constipation, diarrhea and nausea. Endocrine: Negative for cold intolerance, heat intolerance, polydipsia, polyphagia and polyuria. Genitourinary: Negative for frequency and urgency. Musculoskeletal: Negative for arthralgias, joint swelling and myalgias.    Skin: Negative for color change and pallor. Neurological: Negative for weakness, numbness and headaches. Psychiatric/Behavioral: Negative for dysphoric mood and sleep disturbance. The patient is not nervous/anxious. Vitals:    12/09/20 1137   BP: 118/78   Pulse: 95   Resp: 14   SpO2: 96%   Weight: 171 lb (77.6 kg)   Height: 5' (1.524 m)     Physical Exam  Vitals signs reviewed. Constitutional:       Appearance: She is well-developed. HENT:      Head: Normocephalic and atraumatic. Eyes:      Pupils: Pupils are equal, round, and reactive to light. Neck:      Musculoskeletal: Normal range of motion and neck supple. Thyroid: No thyromegaly. Cardiovascular:      Rate and Rhythm: Normal rate and regular rhythm. Heart sounds: Normal heart sounds. Pulmonary:      Effort: Pulmonary effort is normal.      Breath sounds: Normal breath sounds. Abdominal:      General: Bowel sounds are normal. There is no distension. Palpations: Abdomen is soft. Musculoskeletal: Normal range of motion. General: No tenderness. Skin:     General: Skin is warm and dry. Comments: No skin changes or evidence of trauma. Neurological:      Mental Status: She is alert and oriented to person, place, and time. Sensory: No sensory deficit. Gait: Gait normal.      Deep Tendon Reflexes: Reflexes are normal and symmetric. Psychiatric:         Behavior: Behavior normal.         Thought Content: Thought content normal.         Judgment: Judgment normal.       Skeletal foot exam:  deferred . Assessment  Sudheer Ramirez is a 64 y.o. female with uncontrolled Diabetes Mellitus type 2 complicated by peripheral neuropathy and associated with HTN, HLD, COPD, depression.     Plan  Diabetes Mellitus Type 2  Lab Results   Component Value Date    LABA1C 11.9 12/09/2020       Goal A1c  < 6.5%  Medication : continue Metformin  Insulin : Lantus 15 units QHS  Avoid hypoglycemia    Diet :   Discussed carb counting, use calorieking. com  30-40 grams per meal , 3 meals per day, avoid carbs in snack choices  Include moderate proteins and good fats   Ensure adequate hydration and  electrolyte replacement    Exercise :  Recommended exercise is 5-7 days a week for 30-60 mins at least, per day OR a total of 2.5 hours per week , which ever is more feasible. Ambulate as possible     Diabetic Health Maintenance  Follow up with annual eye exams  Follow up with annual podiatry exams if needed  Reviewed sick day management of BG     Other areas of Diabetic Education reviewed:   Carbs: good carbs and bad carbs, importance of carb counting, incorporation of protein with each meal to reduce Glycemic index, importance of portions, Carb/insulin ratio   Fats: Good fats and bad fats, meal planning and supplements.  Discussed how food affects blood sugar readings.  Different diabetic medications   Managing high and low sugar readings   Rotation of sites for subcutaneous medication injection    Mixed Hyperlipidemia  Lab Results   Component Value Date    LDLCALC see below 09/21/2020    LDLCALC 110 08/21/2019    LDLCALC see below 02/14/2018     Lab Results   Component Value Date    LDLDIRECT 154 09/21/2020    LDLDIRECT 117 02/14/2018    LDLDIRECT 119 05/12/2017     Lab Results   Component Value Date    TRIG 869 09/21/2020    TRIG 352 05/12/2017    TRIG 342 10/05/2016     LDL goal  < 100;   TG elevated at   Continue current regimen  Managed by PCP    Essential Hypertension  Blood pressure controlled. Continue current regimen  Blood pressure elevated at visit. States has not had morning meds. BP low at visit. Is fasting for labs.      Vitamin D deficiency  Lab Results   Component Value Date    VITD25 19.8 (L) 02/14/2018     Continue to supplement  Recheck levels    Problem List Items Addressed This Visit     Diabetic polyneuropathy ([address]    Relevant Medications    insulin glargine (LANTUS SOLOSTAR) 100 UNIT/ML injection pen    Tobacco abuse    HTN (hypertension), benign    Acquired hypothyroidism    Uncontrolled type 2 diabetes mellitus with hyperosmolarity without coma, without long-term current use of insulin (HCC) - Primary    Relevant Medications    insulin glargine (LANTUS SOLOSTAR) 100 UNIT/ML injection pen    Insulin Pen Needle 32G X 4 MM MISC    Other Relevant Orders    POCT glycosylated hemoglobin (Hb A1C) (Completed)    Obesity    Relevant Medications    insulin glargine (LANTUS SOLOSTAR) 100 UNIT/ML injection pen          Greater than 40 minutes spent directly counseling patient about topics listed above (such as lifestyle modifications, preventative screenings and/or disease related processes. Return in about 1 month (around 1/9/2021).

## 2020-12-09 NOTE — PATIENT INSTRUCTIONS
Approach for  your diet  1. Use Calorieking. com to look up carb counts as discussed at visit  2. Target for 20-25 grams of carbohydrates or less per meal, and plan for three meals a day  3. Snacks if consumed should be low in carb, and avoid all processed snacks as far as possible  4. Decrease rcarb consumption in beverage form: regular soda, fruit juices  5. Moderate protein and good fats are ok. 6. Increase fiber via vegetables. Limit fruit intake. 7. Eliminate use of sugar as far as possible, minimize  all artificial sweeteners. 8. Ensure good hydration: 80 -100 oz minimum  9.  Ensure electrolyte replacement: powerade or gatorade ZERO or pedialyte etc.        Physical Activity  Recommended exercise is 5-7 days a week for 30-60 mins at least, per day OR a total of 2.5 hours per week , which ever is more feasible for you

## 2020-12-09 NOTE — TELEPHONE ENCOUNTER
Patient stated she started insulin today and she wants to know if she is to still take her metformin or stop it

## 2020-12-14 RX ORDER — LOSARTAN POTASSIUM 50 MG/1
TABLET ORAL
Qty: 90 TABLET | Refills: 3 | Status: SHIPPED | OUTPATIENT
Start: 2020-12-14 | End: 2021-05-27

## 2020-12-14 NOTE — TELEPHONE ENCOUNTER
LOV 10.19.2020    No future visit     The patient would like a refill for  losartan (COZAAR) tablet 50 mg.        Send to [email or web address] in [place], [place]

## 2020-12-24 RX ORDER — PRAVASTATIN SODIUM 40 MG
TABLET ORAL
Qty: 30 TABLET | Refills: 5 | OUTPATIENT
Start: 2020-12-24

## 2020-12-25 ASSESSMENT — ENCOUNTER SYMPTOMS
DIARRHEA: 0
SHORTNESS OF BREATH: 0
CONSTIPATION: 0
EYE PAIN: 0
COLOR CHANGE: 0
NAUSEA: 0

## 2021-01-12 DIAGNOSIS — E11.00 UNCONTROLLED TYPE 2 DIABETES MELLITUS WITH HYPEROSMOLARITY WITHOUT COMA, WITHOUT LONG-TERM CURRENT USE OF INSULIN (HCC): ICD-10-CM

## 2021-01-12 RX ORDER — METFORMIN HYDROCHLORIDE 500 MG/1
TABLET, EXTENDED RELEASE ORAL
Qty: 60 TABLET | Refills: 1 | Status: SHIPPED | OUTPATIENT
Start: 2021-01-12 | End: 2021-03-11

## 2021-01-25 RX ORDER — IPRATROPIUM BROMIDE AND ALBUTEROL SULFATE 2.5; .5 MG/3ML; MG/3ML
SOLUTION RESPIRATORY (INHALATION)
Qty: 360 ML | Refills: 5 | Status: SHIPPED | OUTPATIENT
Start: 2021-01-25 | End: 2022-09-06

## 2021-01-26 ENCOUNTER — OFFICE VISIT (OUTPATIENT)
Dept: ENDOCRINOLOGY | Age: 61
End: 2021-01-26
Payer: COMMERCIAL

## 2021-01-26 VITALS
HEIGHT: 60 IN | BODY MASS INDEX: 33.38 KG/M2 | OXYGEN SATURATION: 97 % | HEART RATE: 94 BPM | SYSTOLIC BLOOD PRESSURE: 132 MMHG | WEIGHT: 170 LBS | DIASTOLIC BLOOD PRESSURE: 66 MMHG

## 2021-01-26 DIAGNOSIS — E03.9 ACQUIRED HYPOTHYROIDISM: ICD-10-CM

## 2021-01-26 DIAGNOSIS — E11.00 UNCONTROLLED TYPE 2 DIABETES MELLITUS WITH HYPEROSMOLARITY WITHOUT COMA, WITHOUT LONG-TERM CURRENT USE OF INSULIN (HCC): Primary | ICD-10-CM

## 2021-01-26 DIAGNOSIS — I10 HTN (HYPERTENSION), BENIGN: ICD-10-CM

## 2021-01-26 DIAGNOSIS — E66.9 CLASS 1 OBESITY WITH SERIOUS COMORBIDITY AND BODY MASS INDEX (BMI) OF 33.0 TO 33.9 IN ADULT, UNSPECIFIED OBESITY TYPE: ICD-10-CM

## 2021-01-26 LAB — HBA1C MFR BLD: 9.5 %

## 2021-01-26 PROCEDURE — 2022F DILAT RTA XM EVC RTNOPTHY: CPT | Performed by: NURSE PRACTITIONER

## 2021-01-26 PROCEDURE — 99214 OFFICE O/P EST MOD 30 MIN: CPT | Performed by: NURSE PRACTITIONER

## 2021-01-26 PROCEDURE — 3046F HEMOGLOBIN A1C LEVEL >9.0%: CPT | Performed by: NURSE PRACTITIONER

## 2021-01-26 PROCEDURE — 4004F PT TOBACCO SCREEN RCVD TLK: CPT | Performed by: NURSE PRACTITIONER

## 2021-01-26 PROCEDURE — G8417 CALC BMI ABV UP PARAM F/U: HCPCS | Performed by: NURSE PRACTITIONER

## 2021-01-26 PROCEDURE — G8427 DOCREV CUR MEDS BY ELIG CLIN: HCPCS | Performed by: NURSE PRACTITIONER

## 2021-01-26 PROCEDURE — G8484 FLU IMMUNIZE NO ADMIN: HCPCS | Performed by: NURSE PRACTITIONER

## 2021-01-26 PROCEDURE — 3017F COLORECTAL CA SCREEN DOC REV: CPT | Performed by: NURSE PRACTITIONER

## 2021-01-26 PROCEDURE — 83036 HEMOGLOBIN GLYCOSYLATED A1C: CPT | Performed by: NURSE PRACTITIONER

## 2021-01-26 RX ORDER — INSULIN GLARGINE 100 [IU]/ML
20 INJECTION, SOLUTION SUBCUTANEOUS NIGHTLY
Qty: 5 PEN | Refills: 3 | Status: SHIPPED | OUTPATIENT
Start: 2021-01-26 | End: 2021-02-20 | Stop reason: SDUPTHER

## 2021-01-26 ASSESSMENT — ENCOUNTER SYMPTOMS
DIARRHEA: 0
SHORTNESS OF BREATH: 0
CONSTIPATION: 0
EYE PAIN: 0
NAUSEA: 0
COLOR CHANGE: 0

## 2021-01-26 NOTE — PROGRESS NOTES
Endocrinology  Raisa Carlton, CNP, [address]  [place], [address]  Phone [phone]  Fax [phone]    Pardeep Coy is a 64 y.o. female who is a new patient following up for management of Diabetes Mellitus Type 2. PCP: Suraj Garcia MD    Last A1C:   Lab Results   Component Value Date    LABA1C 9.5 01/26/2021    LABA1C 11.9 12/09/2020    LABA1C 11.5 09/21/2020     Last BP Readings:  BP Readings from Last 3 Encounters:   01/26/21 132/66   12/09/20 118/78   10/19/20 138/74     Last LDL:   Lab Results   Component Value Date    LDLCALC see below 09/21/2020    LDLDIRECT 154 (H) 09/21/2020     Aspirin Use: no    Tobacco/Alcohol History:   Tobacco Use    Smoking status: Current Every Day Smoker     Packs/day: 1.50     Years: 39.00     Pack years: 58.50     Types: Cigarettes     Start date: 1/1/1978    Smokeless tobacco: Never Used   Substance and Sexual Activity    Alcohol use: No     Alcohol/week: 0.0 standard drinks    Drug use: Not on file     Diabetes:  Mari Marx  was diagnosed with diabetes type 2 in : > 8   On insulin: none   Patient reports that disease course has been variable and is currently poorly controlled   Current microvascular complications include peripheral neuropathy   Current Macrovascular complications there is no history of  CVD, CAD, PVD   Patient reports compliance for about 30 % of the time and adheres to medication, but usually not to diet and exercise instructions.  There have been no recent hospital or ED admissions for hypoglycemia, hyperglycemia or DKA.   [facility] Other ED visit include for   PMH includes  Past Medical History:   Diagnosis Date    Anxiety     Bipolar disorder ([facility] [address]     Brain aneurysm     [place]    Closed angle glaucoma     COPD (chronic obstructive pulmonary disease) ([facility] [address]     Hyperlipidemia     Hypertension     Hypothyroidism     Migraines     Open-angle glaucoma of both eyes     RLS (restless legs 16.6 04/26/2016       Hypertension  Controlled , denies symptoms of dizziness, light headedness. Occasional dependent edema. Tries to follow a salt restricted diet. Lab Results   Component Value Date     (L) 09/21/2020    K 4.9 09/21/2020    CL 92 (L) 09/21/2020    CO2 19 (L) 09/21/2020    BUN 15 09/21/2020    CREATININE 0.7 09/21/2020    GLUCOSE 431 (H) 09/21/2020    CALCIUM 10.6 09/21/2020    PROT 7.6 09/21/2020    LABALBU 4.5 09/21/2020    BILITOT <0.2 09/21/2020    ALKPHOS 126 09/21/2020    AST 44 (H) 09/21/2020    ALT 40 09/21/2020    LABGLOM >60 09/21/2020    GFRAA >60 09/21/2020    AGRATIO 1.5 09/21/2020    GLOB 3.1 09/21/2020     The 10-year ASCVD risk score (Roxanne Herrera, et al., 2013) is: 24%    Values used to calculate the score:      Age: 64 years      Sex: Female      Is Non- : No      Diabetic: Yes      Tobacco smoker: Yes      Systolic Blood Pressure: 485 mmHg      Is BP treated: Yes      HDL Cholesterol: 49 mg/dL      Total Cholesterol: 307 mg/dL    Family History   Problem Relation Age of Onset    Cancer Mother         colon; lung    Emphysema Mother     Hypertension Mother     Heart Disease Father     Heart Failure Father     Bipolar Disorder Sister     Osteoporosis Sister     Asthma Neg Hx     Diabetes Neg Hx      Review of Systems   Constitutional: Negative for activity change, appetite change, diaphoresis, fever and unexpected weight change. HENT: Negative for dental problem. Eyes: Negative for pain and visual disturbance. Respiratory: Negative for shortness of breath. Cardiovascular: Negative for chest pain, palpitations and leg swelling. Gastrointestinal: Negative for constipation, diarrhea and nausea. Endocrine: Negative for cold intolerance, heat intolerance, polydipsia, polyphagia and polyuria. Genitourinary: Negative for frequency and urgency. Musculoskeletal: Negative for arthralgias, joint swelling and myalgias.    Skin: Negative for color change and pallor. Neurological: Negative for weakness, numbness and headaches. Psychiatric/Behavioral: Negative for dysphoric mood and sleep disturbance. The patient is not nervous/anxious. Vitals:    01/26/21 1010   BP: 132/66   Pulse: 94   SpO2: 97%   Weight: 170 lb (77.1 kg)   Height: 5' (1.524 m)     Physical Exam  Vitals signs reviewed. Constitutional:       Appearance: She is well-developed. HENT:      Head: Normocephalic and atraumatic. Eyes:      Pupils: Pupils are equal, round, and reactive to light. Neck:      Musculoskeletal: Normal range of motion and neck supple. Thyroid: No thyromegaly. Cardiovascular:      Rate and Rhythm: Normal rate and regular rhythm. Heart sounds: Normal heart sounds. Pulmonary:      Effort: Pulmonary effort is normal.      Breath sounds: Normal breath sounds. Abdominal:      General: Bowel sounds are normal. There is no distension. Palpations: Abdomen is soft. Musculoskeletal: Normal range of motion. General: No tenderness. Skin:     General: Skin is warm and dry. Comments: No skin changes or evidence of trauma. Neurological:      Mental Status: She is alert and oriented to person, place, and time. Sensory: No sensory deficit. Gait: Gait normal.      Deep Tendon Reflexes: Reflexes are normal and symmetric. Psychiatric:         Behavior: Behavior normal.         Thought Content: Thought content normal.         Judgment: Judgment normal.       Skeletal foot exam:  deferred . Assessment  Lilo Singh is a 64 y.o. female with uncontrolled Diabetes Mellitus type 2 complicated by peripheral neuropathy and associated with HTN, HLD, COPD, depression.     Plan  Diabetes Mellitus Type 2  Lab Results   Component Value Date    LABA1C 9.5 01/26/2021       Goal A1c  < 6.5%  A1c improved,   Medication : continue Metformin  Insulin : Lantus increase to 20 units QHS  Avoid hypoglycemia  Follow up on labs    Diet :   Has changed diet but is still on 2 cans of regular coke daily  Discussed carb counting, use calorieking. com  30-40 grams per meal , 3 meals per day, avoid carbs in snack choices  Include moderate proteins and good fats   Ensure adequate hydration and  electrolyte replacement    Exercise :  Recommended exercise is 5-7 days a week for 30-60 mins at least, per day OR a total of 2.5 hours per week , which ever is more feasible. Ambulate as possible     Diabetic Health Maintenance  Follow up with annual eye exams  Follow up with annual podiatry exams if needed  Reviewed sick day management of BG     Other areas of Diabetic Education reviewed:   Carbs: good carbs and bad carbs, importance of carb counting, incorporation of protein with each meal to reduce Glycemic index, importance of portions, Carb/insulin ratio   Fats: Good fats and bad fats, meal planning and supplements.  Discussed how food affects blood sugar readings.  Different diabetic medications   Managing high and low sugar readings   Rotation of sites for subcutaneous medication injection    Mixed Hyperlipidemia  Lab Results   Component Value Date    LDLCALC see below 09/21/2020    LDLCALC 110 08/21/2019    LDLCALC see below 02/14/2018     Lab Results   Component Value Date    LDLDIRECT 154 09/21/2020    LDLDIRECT 117 02/14/2018    LDLDIRECT 119 05/12/2017     Lab Results   Component Value Date    TRIG 869 09/21/2020    TRIG 352 05/12/2017    TRIG 342 10/05/2016     LDL goal  < 100;   TG elevated at   Continue current regimen  Managed by PCP    Essential Hypertension  Blood pressure controlled. Continue current regimen  Blood pressure elevated at visit. States has not had morning meds. BP low at visit. Is fasting for labs.      Vitamin D deficiency  Lab Results   Component Value Date    VITD25 19.8 (L) 02/14/2018     Continue to supplement  Recheck levels    Problem List Items Addressed This Visit     HTN (hypertension), benign Relevant Orders    Comprehensive Metabolic Panel    Acquired hypothyroidism    Uncontrolled type 2 diabetes mellitus with hyperosmolarity without coma, without long-term current use of insulin (HCC) - Primary    Relevant Medications    insulin glargine (LANTUS SOLOSTAR) 100 UNIT/ML injection pen    Other Relevant Orders    Lipid Panel    Comprehensive Metabolic Panel    C-Peptide    Glutamic Acid Decarboxylase    INSULIN ANTIBODY    POCT glycosylated hemoglobin (Hb A1C) (Completed)    Obesity    Relevant Medications    insulin glargine (LANTUS SOLOSTAR) 100 UNIT/ML injection pen    Other Relevant Orders    Lipid Panel          Greater than 40 minutes spent directly counseling patient about topics listed above (such as lifestyle modifications, preventative screenings and/or disease related processes. Return in about 3 months (around 4/26/2021).

## 2021-02-03 ENCOUNTER — HOSPITAL ENCOUNTER (OUTPATIENT)
Dept: WOMENS IMAGING | Age: 61
Discharge: HOME | End: 2021-02-03
Payer: COMMERCIAL

## 2021-02-03 DIAGNOSIS — Z12.31 VISIT FOR SCREENING MAMMOGRAM: ICD-10-CM

## 2021-02-03 PROCEDURE — 77067 SCR MAMMO BI INCL CAD: CPT

## 2021-02-09 DIAGNOSIS — E78.5 DYSLIPIDEMIA: ICD-10-CM

## 2021-02-09 RX ORDER — ATORVASTATIN CALCIUM 40 MG/1
TABLET, FILM COATED ORAL
Qty: 90 TABLET | Refills: 1 | Status: SHIPPED | OUTPATIENT
Start: 2021-02-09 | End: 2021-07-28

## 2021-02-16 ENCOUNTER — VIRTUAL VISIT (OUTPATIENT)
Dept: PULMONOLOGY | Age: 61
End: 2021-02-16
Payer: COMMERCIAL

## 2021-02-16 DIAGNOSIS — G25.81 RLS (RESTLESS LEGS SYNDROME): ICD-10-CM

## 2021-02-16 DIAGNOSIS — J44.9 CHRONIC OBSTRUCTIVE PULMONARY DISEASE, UNSPECIFIED COPD TYPE (HCC): Primary | ICD-10-CM

## 2021-02-16 PROCEDURE — 99214 OFFICE O/P EST MOD 30 MIN: CPT | Performed by: INTERNAL MEDICINE

## 2021-02-16 RX ORDER — PREDNISONE 10 MG/1
TABLET ORAL
Qty: 15 TABLET | Refills: 0 | Status: SHIPPED | OUTPATIENT
Start: 2021-02-16 | End: 2021-03-29

## 2021-02-16 NOTE — PROGRESS NOTES
CHIEF COMPLAINT:  Restless legs syndrome     HPI:   Interval history:   COPD: 'breathing is okay\" but she is having more chest discomfort for the past month, cough makes chest hurt more. Using Advair two puffs twice daily. RLS: uses Requip with benefit, 2 mg morning, 2 mg evening. Dose not changed. No compulsive behavior. No sleep attacks. Legs occasionally still give trouble, but she is managing. Presenting history:   The patient is a 51 yo woman with hx of tobacco abuse, brain aneurysm, DM presents with chronic dyspnea on exertion. Patient complains of shortness of breath. Symptoms include dyspnea on exertion. Symptoms began a few years ago, gradually worsening since that time. The dyspnea occurs with moderate activity. Patient denies hemoptysis . Aggravating factors include exertion, exercise and climbing stairs. The patient reports an exercise tolerance of approximately 3 blocks on the flat and 1 flight of stairs, limited primarily by dyspnea and leg pain. She smokes 1 ppd x 32 years. She has used spiriva and advair in the past without benefit. Currently using symbicort and albuterol with some response. Initially followed by Dr. Milana Cranker. reports that she has been smoking cigarettes. She started smoking about 43 years ago. She has a 58.50 pack-year smoking history. She has never used smokeless tobacco.    Objective:   not currently breastfeeding. VIRTUAL  Constitutional:  No acute distress. Appears well developed and nourished. Eyes: EOM intact. Conjunctivae anicteric. No visible discharge. HENT: Head is normocephalic and atraumatic. Mucus membranes are moist and the tongue appears normal. Normal appearing nose. External Ears normal. Neck with no obvious mass and the trachea is midline. Respiratory: No accessory muscle usage. Respiratory effort normal. No visualized signs of difficulty breathing or respiratory distress  Cardiovascular: No obvious peripheral edema.    Skin: No significant exanthematous lesions or discoloration noted on facial skin    Psychiatric: No anxiety or Agitation. Alert and Oriented to person, place and time. Normal judgement and insight. PFTs 10/5/12  FVC 2.72(88%) FEV1 2.12 (86%) FEV1/FVC ratio  78% TLC 3.82 (83%) DLCO 14.71 (68%)  No BD response  PFT 4/29/14 FEV1 2.07 L 86% TLC 3.69 L (80%) DLCO 13.76 64%  PFT 7/19/18 FEV1 2.03 L 88% TLC 3.91 L 85%   DLCO 12.22 58%    6MWT 1120 feet, low sat 95%    IMAGING:    LDCT 8/4/20  FINDINGS:    Mediastinum:  Thyroid gland appears normal.  Atherosclerotic change seen in    aorta.  Small calcified and noncalcified mediastinal nodes are noted. Coronary artery calcification is seen.  Aortic annulus calcification is seen. No pericardial effusion         Small hiatal hernia seen. Dacia Abed is nonspecific thickening at the GE junction         Lungs/Pleura:  Respiratory motion artifact limits evaluation of fine    pulmonary parenchymal change.  No obstructing endobronchial lesions are seen. No pneumonia.  No edema.  No pleural effusion noted         There is a probable small tracheal diverticulum seen at the thoracic inlet on    the right., similar to prior         Calcified granuloma seen in left lower lobe         Tiny punctate nodule right middle lobe appears unchanged         Tiny punctate nodule in the lingula, anterior to the fissure appears    unchanged.         Upper Abdomen:  Right adrenal gland is normal.  Left adrenal gland is normal         Low attenuation is seen throughout the liver, compatible with fatty    infiltration.         Soft Tissues/Bones: Spurring is seen in the spine.  Spurring is seen in the    shoulder joints              Impression    Stable chest CT.  A few punctate pulmonary nodules are unchanged      Echo 2015  - Left ventricle: The cavity size was normal. Wall thickness  was normal. Systolic function was normal. The estimated  ejection fraction was in the range of 60% to 65%.  Wall  motion was normal; there were no regional wall motion  abnormalities. Left ventricular diastolic function  parameters were normal. Pulmonary arteries: PA peak  pressure: 37mm Hg (S).    3/12/17 CXR clear lung fields    ASSESSMENT AND PLAN:  · Pulmonary Centrilobular Emphysema with acute exacerbation. · Working on stopping smoking. Advair 115/21 2 puffs daily, albuterol MDI and duonebs; plan to increase advair to BID, short 5 days of prednisone 30 mg daily - Intolerant of Spiriva due to sensation of swelling in throat. · Restless legs syndrome:   · Requip 2 mg at bedtime and up to 2 mg earlier in day. She is doing well and is stable. Previously cautioned re: avoiding driving with requip, potential for sleep attack  · Depression: treated at [facility], has been long term treated with Venlafaxine   · Tobacco abuse -    · Has cut down to 1 ppd from 1.5 ppd. Greater than 43 pack year history. Pre-contemplative  · H/O vocal cord polyp. Excised by Dr. Val Titus in 2018. Repeat scope in 2020. · Should already be scheduled for LDCT Aug 2021, see me after       Screening CT scan was considered in a lung cancer screening counseling and shared decision making visit today that included the following elements:    Eligibility: Age: 64. There are no signs or symptoms of lung cancer.   Tobacco History 43 pack-years, quit zero years ago   Verbal counseling has been performed by me to include benefits and harms of screening, follow-up diagnostic testing, over-diagnosis, false positive rate, and total radiation exposure;    I have counseled on the importance of adherence to annual lung cancer LDCT screening, the impact of comorbidities and patient is willing to undergo diagnosis and treatment;    I have provided counseling on the importance of maintaining cigarette smoking abstinence if former smoker; or the importance of smoking cessation if current smoker and, if appropriate, furnishing of information about tobacco cessation interventions; and    I have furnished a written order for lung cancer screening with LDCT.       Order for Screening chest CT scan should be placed with documentation as below:   Beneficiary date of birth;    Actual pack - year smoking history (number) from above;    Current smoking status, and for former smokers, the number of years since quitting smoking from above  [facility] is asymptomatic   [employer] for [facility] [phone]

## 2021-02-17 ENCOUNTER — TELEPHONE (OUTPATIENT)
Dept: ENDOCRINOLOGY | Age: 61
End: 2021-02-17

## 2021-02-17 DIAGNOSIS — E11.00 UNCONTROLLED TYPE 2 DIABETES MELLITUS WITH HYPEROSMOLARITY WITHOUT COMA, WITHOUT LONG-TERM CURRENT USE OF INSULIN (HCC): ICD-10-CM

## 2021-02-20 DIAGNOSIS — E11.00 UNCONTROLLED TYPE 2 DIABETES MELLITUS WITH HYPEROSMOLARITY WITHOUT COMA, WITHOUT LONG-TERM CURRENT USE OF INSULIN (HCC): ICD-10-CM

## 2021-02-20 RX ORDER — INSULIN GLARGINE 100 [IU]/ML
20 INJECTION, SOLUTION SUBCUTANEOUS NIGHTLY
Qty: 5 PEN | Refills: 3 | Status: SHIPPED | OUTPATIENT
Start: 2021-02-20 | End: 2021-02-20 | Stop reason: SDUPTHER

## 2021-02-20 RX ORDER — INSULIN GLARGINE 100 [IU]/ML
20 INJECTION, SOLUTION SUBCUTANEOUS NIGHTLY
Qty: 5 PEN | Refills: 3 | Status: SHIPPED | OUTPATIENT
Start: 2021-02-20 | End: 2021-05-24 | Stop reason: SDUPTHER

## 2021-02-20 NOTE — TELEPHONE ENCOUNTER
Patient called and said pillbox in [place] did not get the script for lantus for 20 units      [address] [facility], [place] - [address] [address]
Sent again
stretcher

## 2021-02-22 RX ORDER — FLUTICASONE PROPIONATE AND SALMETEROL XINAFOATE 115; 21 UG/1; UG/1
AEROSOL, METERED RESPIRATORY (INHALATION)
Qty: 12 G | Refills: 5 | Status: SHIPPED | OUTPATIENT
Start: 2021-02-22 | End: 2021-07-15

## 2021-02-23 ENCOUNTER — HOSPITAL ENCOUNTER (OUTPATIENT)
Age: 61
Discharge: HOME | End: 2021-02-23
Payer: COMMERCIAL

## 2021-02-23 ENCOUNTER — HOSPITAL ENCOUNTER (OUTPATIENT)
Dept: GENERAL RADIOLOGY | Age: 61
Discharge: HOME | End: 2021-02-23
Payer: COMMERCIAL

## 2021-02-23 DIAGNOSIS — M51.9 INTERVERTEBRAL DISC DISORDER: ICD-10-CM

## 2021-02-23 DIAGNOSIS — M25.552 LEFT HIP PAIN: ICD-10-CM

## 2021-02-23 PROCEDURE — 72100 X-RAY EXAM L-S SPINE 2/3 VWS: CPT

## 2021-02-23 PROCEDURE — 73501 X-RAY EXAM HIP UNI 1 VIEW: CPT

## 2021-03-09 RX ORDER — IBUPROFEN 800 MG/1
TABLET ORAL
Qty: 60 TABLET | Refills: 5 | Status: SHIPPED | OUTPATIENT
Start: 2021-03-09 | End: 2021-04-06

## 2021-03-11 DIAGNOSIS — E78.5 DYSLIPIDEMIA: ICD-10-CM

## 2021-03-11 RX ORDER — EZETIMIBE 10 MG/1
TABLET ORAL
Qty: 30 TABLET | Refills: 5 | Status: SHIPPED | OUTPATIENT
Start: 2021-03-11 | End: 2021-08-25

## 2021-03-18 RX ORDER — ROPINIROLE 2 MG/1
TABLET, FILM COATED ORAL
Qty: 60 TABLET | Refills: 4 | Status: SHIPPED | OUTPATIENT
Start: 2021-03-18 | End: 2021-07-27

## 2021-03-29 ENCOUNTER — VIRTUAL VISIT (OUTPATIENT)
Dept: FAMILY MEDICINE CLINIC | Age: 61
End: 2021-03-29
Payer: COMMERCIAL

## 2021-03-29 ENCOUNTER — NURSE TRIAGE (OUTPATIENT)
Dept: OTHER | Facility: CLINIC | Age: 61
End: 2021-03-29

## 2021-03-29 DIAGNOSIS — R05.9 COUGH: ICD-10-CM

## 2021-03-29 DIAGNOSIS — J44.9 CHRONIC OBSTRUCTIVE PULMONARY DISEASE, UNSPECIFIED COPD TYPE (HCC): ICD-10-CM

## 2021-03-29 DIAGNOSIS — J06.9 UPPER RESPIRATORY TRACT INFECTION, UNSPECIFIED TYPE: Primary | ICD-10-CM

## 2021-03-29 PROBLEM — M65.90 SYNOVITIS: Status: ACTIVE | Noted: 2021-03-29

## 2021-03-29 PROBLEM — M47.817 LUMBOSACRAL SPONDYLOSIS WITHOUT MYELOPATHY: Status: ACTIVE | Noted: 2021-03-29

## 2021-03-29 PROBLEM — M23.91 INTERNAL DERANGEMENT OF RIGHT KNEE: Status: ACTIVE | Noted: 2021-03-29

## 2021-03-29 PROBLEM — M51.26 DISPLACEMENT OF LUMBAR INTERVERTEBRAL DISC WITHOUT MYELOPATHY: Status: ACTIVE | Noted: 2021-03-29

## 2021-03-29 PROBLEM — M25.80 SESAMOIDITIS: Status: ACTIVE | Noted: 2018-05-04

## 2021-03-29 PROBLEM — M20.10 VALGUS DEFORMITY OF GREAT TOE: Status: ACTIVE | Noted: 2018-05-04

## 2021-03-29 PROBLEM — M65.9 SYNOVITIS: Status: ACTIVE | Noted: 2021-03-29

## 2021-03-29 PROCEDURE — 99441 PR PHYS/QHP TELEPHONE EVALUATION 5-10 MIN: CPT | Performed by: NURSE PRACTITIONER

## 2021-03-29 RX ORDER — METHYLPREDNISOLONE 4 MG/1
TABLET ORAL
Qty: 1 KIT | Refills: 0 | Status: SHIPPED | OUTPATIENT
Start: 2021-03-29 | End: 2021-05-27

## 2021-03-29 RX ORDER — AZITHROMYCIN 250 MG/1
250 TABLET, FILM COATED ORAL SEE ADMIN INSTRUCTIONS
Qty: 6 TABLET | Refills: 0 | Status: SHIPPED | OUTPATIENT
Start: 2021-03-29 | End: 2021-04-03

## 2021-03-29 RX ORDER — BENZONATATE 100 MG/1
100 CAPSULE ORAL 3 TIMES DAILY PRN
Qty: 30 CAPSULE | Refills: 0 | Status: SHIPPED | OUTPATIENT
Start: 2021-03-29 | End: 2021-04-05

## 2021-03-29 ASSESSMENT — ENCOUNTER SYMPTOMS: COUGH: 1

## 2021-03-29 NOTE — PROGRESS NOTES
Patient: Abrahan Alexander is a 64 y.o. female who presents today with the following Chief Complaint(s):  Chief Complaint   Patient presents with    Cough     6 weeks   Consented to a phone visit      HPI-This is a 64year old female patient of Dr. Yomaira Brown with c/o's cough for 6 weeks now. She has been doing her inhalers and Duoneb nebulizer as directed. She states that sometimes she has white sputum that she coughs up but it is a mostly dry cough. She has a history of COPD and tobacco abuse. She states she has coughing spells that are bothersome at times. She denies no other symptoms just the cough  Current Outpatient Medications   Medication Sig Dispense Refill    azithromycin (ZITHROMAX) 250 MG tablet Take 1 tablet by mouth See Admin Instructions for 5 days 500mg on day 1 followed by 250mg on days 2 - 5 6 tablet 0    methylPREDNISolone (MEDROL DOSEPACK) 4 MG tablet Take by mouth.  As directed 1 kit 0    benzonatate (TESSALON) 100 MG capsule Take 1 capsule by mouth 3 times daily as needed for Cough 30 capsule 0    rOPINIRole (REQUIP) 2 MG tablet TAKE ONE TABLET BY MOUTH EVERY NIGHT AT BEDTIME AND IN THE EVENING AS NEEDED  60 tablet 4    metFORMIN (GLUCOPHAGE-XR) 500 MG extended release tablet TAKE ONE (1) TABLET TWICE DAILY BY MOUTH  60 tablet 5    ezetimibe (ZETIA) 10 MG tablet TAKE ONE (1) TABLET BY MOUTH DAILY  30 tablet 5     MG tablet TAKE ONE (1) TABLET BY MOUTH TWO (2) TIMES DAILY AS NEEDED FOR PAIN  60 tablet 5    ADVAIR -21 MCG/ACT inhaler INHALE ONE (1) PUFF INTO THE LUNGS TWO (2) TIMES DAILY  12 g 5    insulin glargine (LANTUS SOLOSTAR) 100 UNIT/ML injection pen Inject 20 Units into the skin nightly 5 pen 3    atorvastatin (LIPITOR) 40 MG tablet TAKE ONE (1) TABLET BY MOUTH DAILY  90 tablet 1    ipratropium-albuterol (DUONEB) 0.5-2.5 (3) MG/3ML SOLN nebulizer solution INHALE THREE (3) MLS INTO THE LUNGS EVERY FOUR (4) HOURS AS NEEDED FOR SHORTNESS OF BREATH  360 mL 5    FreeStyle Lancets MISC USE ONCE DAILY  100 each 11    losartan (COZAAR) 50 MG tablet TAKE 1 TABLET BY MOUTH EVERY DAY 90 tablet 3    Insulin Pen Needle 32G X 4 MM MISC 1 each by Does not apply route daily 100 each 3    FREESTYLE LITE strip USE DAILY AS NEEDED  50 each 5    B Complex Vitamins (VITAMIN B COMPLEX PO) Take by mouth      Ascorbic Acid (KARTIK-C PO) Take by mouth      estradiol (ESTRACE) 1 MG tablet       albuterol (PROVENTIL) (2.5 MG/3ML) 0.083% nebulizer solution Take 3 mLs by nebulization every 4 hours as needed for Wheezing 375 mL 5    albuterol sulfate  (90 Base) MCG/ACT inhaler INHALE 2 PUFFS INTO THE LUNGS EVERY 4 HOURS AS NEEDED FOR WHEEZING 1 Inhaler 5    loratadine (CLARITIN) 10 MG tablet TAKE ONE TABLET BY MOUTH ONCE DAILY 30 tablet 11    levothyroxine (SYNTHROID) 100 MCG tablet TAKE ONE TABLET BY MOUTH ONCE DAILY 30 tablet 11    LATUDA 40 MG TABS tablet       FREESTYLE LITE strip USE DAILY AS NEEDED 100 strip 0    Menthol, Topical Analgesic, 4 % GEL Apply 1 Dose topically 3 times daily To the affected area- the neck 74 mL 3    lisinopril (PRINIVIL;ZESTRIL) 10 MG tablet Take 10 mg by mouth daily      HYDROcodone-acetaminophen (NORCO) 5-325 MG per tablet       venlafaxine (EFFEXOR XR) 150 MG extended release capsule       diazepam (VALIUM) 5 MG tablet Take 5 mg by mouth daily as needed for Anxiety      fluticasone (FLONASE) 50 MCG/ACT nasal spray 1 spray by Nasal route daily 1 Bottle 3    ESTROGENS CONJUGATED PO Take by mouth      venlafaxine (EFFEXOR-XR) 75 MG XR capsule   Take 150 mg by mouth See Admin Instructions 150mg in morning, 75mg at night      lamotrigine (LAMICTAL) 100 MG tablet Take 100 mg by mouth 2 times daily.  timolol (BETIMOL) 0.5 % ophthalmic solution 1 drop 2 times daily.  brimonidine (ALPHAGAN P) 0.15 % ophthalmic solution 1 drop 2 times daily. No current facility-administered medications for this visit.         Patient's past medical history, surgical history, family history, medications,  and allergies  were all reviewed and updated as appropriate today. Review of Systems   Respiratory: Positive for cough. All other systems reviewed and are negative. Physical Exam  Nursing note reviewed. Pulmonary:      Effort: Pulmonary effort is normal.   Neurological:      Mental Status: She is alert and oriented to person, place, and time. Psychiatric:         Mood and Affect: Mood normal.         Behavior: Behavior normal.         Thought Content: Thought content normal.         Judgment: Judgment normal.       There were no vitals filed for this visit. Assessment:  Encounter Diagnoses   Name Primary?  Upper respiratory tract infection, unspecified type Yes    Cough     Chronic obstructive pulmonary disease, unspecified COPD type ([facility]        Controlled SubstancesMonitoring:  NA    Plan:  1. Upper respiratory tract infection, unspecified type  Problem  - azithromycin (ZITHROMAX) 250 MG tablet; Take 1 tablet by mouth See Admin Instructions for 5 days 500mg on day 1 followed by 250mg on days 2 - 5  Dispense: 6 tablet; Refill: 0  - methylPREDNISolone (MEDROL DOSEPACK) 4 MG tablet; Take by mouth. As directed  Dispense: 1 kit; Refill: 0  - benzonatate (TESSALON) 100 MG capsule; Take 1 capsule by mouth 3 times daily as needed for Cough  Dispense: 30 capsule; Refill: 0    2. Cough  Problem  - benzonatate (TESSALON) 100 MG capsule; Take 1 capsule by mouth 3 times daily as needed for Cough  Dispense: 30 capsule; Refill: 0    3. Chronic obstructive pulmonary disease, unspecified COPD type ([facility])  Exacerbation  - azithromycin (ZITHROMAX) 250 MG tablet; Take 1 tablet by mouth See Admin Instructions for 5 days 500mg on day 1 followed by 250mg on days 2 - 5  Dispense: 6 tablet; Refill: 0  - methylPREDNISolone (MEDROL DOSEPACK) 4 MG tablet; Take by mouth. As directed  Dispense: 1 kit;  Refill: 0    [facility] is a 64 y.o. female evaluated via telephone on 3/29/2021. Consent:  She and/or health care decision maker is aware that that she may receive a bill for this telephone service, depending on her insurance coverage, and has provided verbal consent to proceed: Yes      Documentation:  I communicated with the patient and/or health care decision maker about See above treatment. Details of this discussion including any medical advice provided: See above      I affirm this is a Patient Initiated Episode with a Patient who has not had a related appointment within my department in the past 7 days or scheduled within the next 24 hours. Patient identification was verified at the start of the visit: Yes    Total Time: minutes: 5-10 minutes    The visit was conducted pursuant to the emergency declaration under the [facility], [facility] authority and the [employer] and [employer] Act. Patient identification was verified, and a caregiver was present when appropriate. The patient was located in a state where the provider was credentialed to provide care. Note: not billable if this call serves to triage the patient into an appointment for the relevant concern      JEREMY Cabrera    Reviewed treatment plan with patient. Patient verbalized understanding to treatment plan and questions were answered. [address] [facility] Monica Olivarez.  [place], [address] [place]

## 2021-03-29 NOTE — TELEPHONE ENCOUNTER
Reason for Disposition   Chest pain present when not coughing    Answer Assessment - Initial Assessment Questions  1. ONSET: \"When did the cough begin? \"   30 days ago. 2. SEVERITY: \"How bad is the cough today? \"   Today, it seems the worse Pt stated. It has her chest hurting. Pt stated she is coughing up mucous. 3. RESPIRATORY DISTRESS: \"Describe your breathing. \"       Pt stated she finishes her coughing fit it is pretty heavy. Pt denies SOB. 4. FEVER: \"Do you have a fever? \" If so, ask: \"What is your temperature, how was it measured, and when did it start? \"  Denies. 5. HEMOPTYSIS: \"Are you coughing up any blood? \" If so ask: \"How much? \" (flecks, streaks, tablespoons, etc.)  Denies. 6. TREATMENT: \"What have you done so far to treat the cough? \" (e.g., meds, fluids, humidifier)  Denies. 7. CARDIAC HISTORY: \"Do you have any history of heart disease? \" (e.g., heart attack, congestive heart failure)   Denies. 8. LUNG HISTORY: \"Do you have any history of lung disease? \"  (e.g., pulmonary embolus, asthma, emphysema)  COPD. 9. PE RISK FACTORS: \"Do you have a history of blood clots? \" (or: recent major surgery, recent prolonged travel, bedridden)  Denies. 10. OTHER SYMPTOMS: \"Do you have any other symptoms? (e.g., runny nose, wheezing, chest pain)  Endorses runny nose, chest pain. She states she may wheeze when she's coughing and trying to get something up. 11. PREGNANCY: \"Is there any chance you are pregnant? \" \"When was your last menstrual period? \"  Denies. 12. TRAVEL: \"Have you traveled out of the country in the last month? \" (e.g., travel history, exposures)  Denies. Protocols used: [id number]    Received call from Magaly douglas at pre-service center [facility] with Red Flag Complaint. Brief description of triage: Cough, see above. Writer called office at 08:44 and spoke with SINDI Guerra [facility].  Writer explained Pt has chest pain present when she is not coughing and would like to speak to provider regarding declining the disposition of going to the ER. Writer was connected with Angel Thompson, who stated there was an appointment available at [address] [place] [address]. Writer informed her of situation, requesting that Bruno RIVERA be made aware of situation and that the Pt be furthered contacted regarding her chest pain present without a cough. Triage indicates for patient to go to ER now. Pt refused, requesting to make office appointment. Writer requested office and MA to make Charles Hatfield MD aware of situation and to have a provider contact Pt further. Care advice provided, patient verbalizes understanding; denies any other questions or concerns; instructed to call back for any new or worsening symptoms. Attention Provider: Thank you for allowing me to participate in the care of your patient. The patient was connected to triage in response to information provided to the [facility]. Please do not respond through this encounter as the response is not directed to a shared pool.

## 2021-04-01 RX ORDER — OMEPRAZOLE 40 MG/1
CAPSULE, DELAYED RELEASE ORAL
Qty: 60 CAPSULE | Refills: 2 | Status: SHIPPED | OUTPATIENT
Start: 2021-04-01 | End: 2021-09-22

## 2021-04-05 DIAGNOSIS — R05.9 COUGH: ICD-10-CM

## 2021-04-05 DIAGNOSIS — J06.9 UPPER RESPIRATORY TRACT INFECTION, UNSPECIFIED TYPE: ICD-10-CM

## 2021-04-05 RX ORDER — BENZONATATE 100 MG/1
CAPSULE ORAL
Qty: 30 CAPSULE | Refills: 0 | Status: SHIPPED | OUTPATIENT
Start: 2021-04-05 | End: 2021-04-12

## 2021-04-06 RX ORDER — IBUPROFEN 800 MG/1
TABLET ORAL
Qty: 60 TABLET | Refills: 5 | Status: SHIPPED | OUTPATIENT
Start: 2021-04-06 | End: 2021-09-27

## 2021-04-12 DIAGNOSIS — R05.9 COUGH: ICD-10-CM

## 2021-04-12 DIAGNOSIS — J06.9 UPPER RESPIRATORY TRACT INFECTION, UNSPECIFIED TYPE: ICD-10-CM

## 2021-04-12 RX ORDER — BENZONATATE 100 MG/1
CAPSULE ORAL
Qty: 30 CAPSULE | Refills: 0 | Status: SHIPPED | OUTPATIENT
Start: 2021-04-12 | End: 2021-04-21

## 2021-04-21 DIAGNOSIS — R05.9 COUGH: ICD-10-CM

## 2021-04-21 DIAGNOSIS — J06.9 UPPER RESPIRATORY TRACT INFECTION, UNSPECIFIED TYPE: ICD-10-CM

## 2021-04-21 RX ORDER — BENZONATATE 100 MG/1
CAPSULE ORAL
Qty: 30 CAPSULE | Refills: 0 | Status: SHIPPED | OUTPATIENT
Start: 2021-04-21 | End: 2021-11-22 | Stop reason: ALTCHOICE

## 2021-05-03 DIAGNOSIS — E11.00 UNCONTROLLED TYPE 2 DIABETES MELLITUS WITH HYPEROSMOLARITY WITHOUT COMA, WITHOUT LONG-TERM CURRENT USE OF INSULIN (HCC): Primary | ICD-10-CM

## 2021-05-07 ENCOUNTER — HOSPITAL ENCOUNTER (OUTPATIENT)
Dept: MRI IMAGING | Age: 61
Discharge: HOME | End: 2021-05-07
Payer: COMMERCIAL

## 2021-05-07 DIAGNOSIS — M51.26 DISC DISPLACEMENT, LUMBAR: ICD-10-CM

## 2021-05-07 DIAGNOSIS — M25.552 LEFT HIP PAIN: ICD-10-CM

## 2021-05-07 PROCEDURE — 72148 MRI LUMBAR SPINE W/O DYE: CPT

## 2021-05-07 PROCEDURE — 73721 MRI JNT OF LWR EXTRE W/O DYE: CPT

## 2021-05-24 ENCOUNTER — TELEPHONE (OUTPATIENT)
Dept: ENDOCRINOLOGY | Age: 61
End: 2021-05-24

## 2021-05-24 DIAGNOSIS — J30.9 ALLERGIC RHINITIS: ICD-10-CM

## 2021-05-24 DIAGNOSIS — E11.00 UNCONTROLLED TYPE 2 DIABETES MELLITUS WITH HYPEROSMOLARITY WITHOUT COMA, WITHOUT LONG-TERM CURRENT USE OF INSULIN (HCC): ICD-10-CM

## 2021-05-24 RX ORDER — LORATADINE 10 MG/1
TABLET ORAL
Qty: 30 TABLET | Refills: 11 | Status: SHIPPED | OUTPATIENT
Start: 2021-05-24 | End: 2022-04-07

## 2021-05-24 RX ORDER — INSULIN GLARGINE 100 [IU]/ML
20 INJECTION, SOLUTION SUBCUTANEOUS NIGHTLY
Qty: 5 PEN | Refills: 0 | Status: SHIPPED | OUTPATIENT
Start: 2021-05-24 | End: 2021-08-03 | Stop reason: SDUPTHER

## 2021-05-24 NOTE — TELEPHONE ENCOUNTER
Patient called and said antonia upped her lantus to 20 units from 15 units. Please send new script to the pharmacy as she is out of insulin now .         [address] [facility], [address] [facility] [phone]

## 2021-05-27 ENCOUNTER — OFFICE VISIT (OUTPATIENT)
Dept: FAMILY MEDICINE CLINIC | Age: 61
End: 2021-05-27
Payer: COMMERCIAL

## 2021-05-27 VITALS
DIASTOLIC BLOOD PRESSURE: 58 MMHG | SYSTOLIC BLOOD PRESSURE: 138 MMHG | OXYGEN SATURATION: 98 % | BODY MASS INDEX: 34.37 KG/M2 | WEIGHT: 176 LBS | HEART RATE: 96 BPM

## 2021-05-27 DIAGNOSIS — J30.9 ALLERGIC RHINITIS, UNSPECIFIED SEASONALITY, UNSPECIFIED TRIGGER: Primary | ICD-10-CM

## 2021-05-27 PROCEDURE — G8417 CALC BMI ABV UP PARAM F/U: HCPCS | Performed by: FAMILY MEDICINE

## 2021-05-27 PROCEDURE — 99214 OFFICE O/P EST MOD 30 MIN: CPT | Performed by: FAMILY MEDICINE

## 2021-05-27 PROCEDURE — G8427 DOCREV CUR MEDS BY ELIG CLIN: HCPCS | Performed by: FAMILY MEDICINE

## 2021-05-27 PROCEDURE — 4004F PT TOBACCO SCREEN RCVD TLK: CPT | Performed by: FAMILY MEDICINE

## 2021-05-27 PROCEDURE — 3017F COLORECTAL CA SCREEN DOC REV: CPT | Performed by: FAMILY MEDICINE

## 2021-05-27 RX ORDER — AZELASTINE 1 MG/ML
2 SPRAY, METERED NASAL 2 TIMES DAILY
Qty: 2 BOTTLE | Refills: 5 | Status: SHIPPED | OUTPATIENT
Start: 2021-05-27

## 2021-05-27 RX ORDER — METHOCARBAMOL 750 MG/1
TABLET, FILM COATED ORAL
COMMUNITY
Start: 2021-04-20

## 2021-05-27 RX ORDER — FLUOROMETHOLONE 0.1 %
SUSPENSION, DROPS(FINAL DOSAGE FORM)(ML) OPHTHALMIC (EYE)
COMMUNITY
Start: 2021-05-25

## 2021-05-27 RX ORDER — FLUTICASONE PROPIONATE 50 MCG
2 SPRAY, SUSPENSION (ML) NASAL DAILY
Qty: 1 BOTTLE | Refills: 5 | Status: SHIPPED | OUTPATIENT
Start: 2021-05-27

## 2021-05-27 RX ORDER — TIMOLOL MALEATE 5 MG/ML
SOLUTION/ DROPS OPHTHALMIC
COMMUNITY
Start: 2021-05-25 | End: 2021-05-27 | Stop reason: CLARIF

## 2021-05-27 ASSESSMENT — ENCOUNTER SYMPTOMS: COUGH: 1

## 2021-05-27 NOTE — PROGRESS NOTES
Michael Edmonds (:  1960) is a 64 y.o. female,Established patient, here for evaluation of the following chief complaint(s):  Cough (x 6 months ) and Chest Pain         ASSESSMENT/PLAN:  1. Allergic rhinitis, unspecified seasonality, unspecified trigger  -     fluticasone (FLONASE) 50 MCG/ACT nasal spray; 2 sprays by Each Nostril route daily, Disp-1 Bottle, R-5Normal  -     azelastine (ASTELIN) 0.1 % nasal spray; 2 sprays by Nasal route 2 times daily Use in each nostril as directed, Disp-2 Bottle, R-5Normal    I think this is the primary generator of her cough at this time. We will have to look further into this if this does not help with her cough significantly. Further possibilities could be something lung or pulmonary related. Consider a chest x-ray and even pulmonary function test in the future if she is not improving. No follow-ups on file. Subjective   SUBJECTIVE/OBJECTIVE:  Michael Edmonds is a 64 y.o. female. Patient presents with:  Cough: x 6 months   Chest Pain      6 months of cough. Patient notes she coughs to the point of chest pain patient notes that she is having a lot of postnasal drip. This is getting worse over time. Patient notes that this causes accumulation of phlegm in her throat. This causes her to cough. This then causes her to experience some chest pain in her sternal area. This is a sharp type chest pain that is worse with deep breaths. She is not short of breath currently. She is not wheezing. She has had no fevers. She has had no myalgias. The patients PMH, surgical history, family history, medications, allergies were all reviewed and updated as appropriate today. Cough  Associated symptoms include chest pain. Chest Pain   Associated symptoms include a cough. Review of Systems   Respiratory: Positive for cough. Cardiovascular: Positive for chest pain. Objective   Physical Exam  Vitals and nursing note reviewed.    Constitutional: Appearance: Normal appearance. She is well-developed. HENT:      Head: Normocephalic and atraumatic. Right Ear: External ear normal.      Left Ear: External ear normal.      Nose: Nose normal.   Eyes:      Conjunctiva/sclera: Conjunctivae normal.      Pupils: Pupils are equal, round, and reactive to light. Cardiovascular:      Rate and Rhythm: Normal rate and regular rhythm. Heart sounds: Normal heart sounds. Pulmonary:      Effort: Pulmonary effort is normal.      Breath sounds: Normal breath sounds. Abdominal:      General: Bowel sounds are normal.      Palpations: Abdomen is soft. Musculoskeletal:         General: Normal range of motion. Cervical back: Normal range of motion and neck supple. Skin:     General: Skin is dry. Neurological:      Mental Status: She is alert and oriented to person, place, and time. Deep Tendon Reflexes: Reflexes are normal and symmetric. Psychiatric:         Behavior: Behavior normal.         Thought Content: Thought content normal.         Judgment: Judgment normal.             An electronic signature was used to authenticate this note.     --Donal Huggins MD

## 2021-06-16 RX ORDER — BLOOD-GLUCOSE METER
1 KIT MISCELLANEOUS DAILY
Qty: 1 KIT | Refills: 0 | Status: SHIPPED | OUTPATIENT
Start: 2021-06-16

## 2021-06-16 RX ORDER — LANCETS 28 GAUGE
1 EACH MISCELLANEOUS DAILY
Qty: 100 EACH | Refills: 3 | Status: SHIPPED | OUTPATIENT
Start: 2021-06-16

## 2021-07-15 RX ORDER — FLUTICASONE PROPIONATE AND SALMETEROL XINAFOATE 115; 21 UG/1; UG/1
AEROSOL, METERED RESPIRATORY (INHALATION)
Qty: 12 G | Refills: 5 | Status: SHIPPED | OUTPATIENT
Start: 2021-07-15 | End: 2022-02-10 | Stop reason: SDUPTHER

## 2021-07-28 DIAGNOSIS — E78.5 DYSLIPIDEMIA: ICD-10-CM

## 2021-07-28 RX ORDER — ATORVASTATIN CALCIUM 40 MG/1
TABLET, FILM COATED ORAL
Qty: 90 TABLET | Refills: 1 | Status: SHIPPED | OUTPATIENT
Start: 2021-07-28

## 2021-08-03 ENCOUNTER — OFFICE VISIT (OUTPATIENT)
Dept: ENDOCRINOLOGY | Age: 61
End: 2021-08-03
Payer: COMMERCIAL

## 2021-08-03 VITALS
SYSTOLIC BLOOD PRESSURE: 158 MMHG | WEIGHT: 175 LBS | OXYGEN SATURATION: 98 % | DIASTOLIC BLOOD PRESSURE: 74 MMHG | HEART RATE: 101 BPM | HEIGHT: 60 IN | BODY MASS INDEX: 34.36 KG/M2

## 2021-08-03 DIAGNOSIS — E11.00 UNCONTROLLED TYPE 2 DIABETES MELLITUS WITH HYPEROSMOLARITY WITHOUT COMA, WITHOUT LONG-TERM CURRENT USE OF INSULIN (HCC): ICD-10-CM

## 2021-08-03 DIAGNOSIS — E78.49 OTHER HYPERLIPIDEMIA: ICD-10-CM

## 2021-08-03 DIAGNOSIS — E11.65 UNCONTROLLED TYPE 2 DIABETES MELLITUS WITH HYPERGLYCEMIA (HCC): Primary | ICD-10-CM

## 2021-08-03 LAB
CREATININE URINE: 37.9 MG/DL (ref 28–259)
HBA1C MFR BLD: 10.8 %
MICROALBUMIN UR-MCNC: 8.1 MG/DL
MICROALBUMIN/CREAT UR-RTO: 213.7 MG/G (ref 0–30)

## 2021-08-03 PROCEDURE — 3017F COLORECTAL CA SCREEN DOC REV: CPT | Performed by: INTERNAL MEDICINE

## 2021-08-03 PROCEDURE — G8427 DOCREV CUR MEDS BY ELIG CLIN: HCPCS | Performed by: INTERNAL MEDICINE

## 2021-08-03 PROCEDURE — 3046F HEMOGLOBIN A1C LEVEL >9.0%: CPT | Performed by: INTERNAL MEDICINE

## 2021-08-03 PROCEDURE — 99214 OFFICE O/P EST MOD 30 MIN: CPT | Performed by: INTERNAL MEDICINE

## 2021-08-03 PROCEDURE — 2022F DILAT RTA XM EVC RTNOPTHY: CPT | Performed by: INTERNAL MEDICINE

## 2021-08-03 PROCEDURE — 4004F PT TOBACCO SCREEN RCVD TLK: CPT | Performed by: INTERNAL MEDICINE

## 2021-08-03 PROCEDURE — 83036 HEMOGLOBIN GLYCOSYLATED A1C: CPT | Performed by: INTERNAL MEDICINE

## 2021-08-03 PROCEDURE — G8417 CALC BMI ABV UP PARAM F/U: HCPCS | Performed by: INTERNAL MEDICINE

## 2021-08-03 RX ORDER — INSULIN GLARGINE 100 [IU]/ML
INJECTION, SOLUTION SUBCUTANEOUS
Qty: 5 PEN | Refills: 2 | Status: SHIPPED | OUTPATIENT
Start: 2021-08-03 | End: 2021-11-08

## 2021-08-03 RX ORDER — DULAGLUTIDE 0.75 MG/.5ML
0.75 INJECTION, SOLUTION SUBCUTANEOUS WEEKLY
Qty: 4 PEN | Refills: 2 | Status: SHIPPED | OUTPATIENT
Start: 2021-08-03 | End: 2021-09-22

## 2021-08-03 NOTE — PROGRESS NOTES
HYSTERECTOMY  2002/2015    total    KNEE SURGERY Right 11/30/2018    OTHER SURGICAL HISTORY  09/2015    left oopherectomy    OTHER SURGICAL HISTORY  04/10/2019    CYSTOSCOPY, TRANSVAGINAL TAPING, CYSTOCELE REPAIR     VA LAP,SLING OPERATION N/A 4/10/2019    CYSTOSCOPY, TRANSVAGINAL TAPING, CYSTOCELE REPAIR performed by Nicki Sarabia MD at [address] Right 2008 and Brinda 1 2013    x2    THROAT SURGERY  06/2018    mass revmoved vocal cord     THROAT SURGERY      UPPER GASTROINTESTINAL ENDOSCOPY  09/12/2016    small bowel bx    URETHRA SURGERY  06/28/2018     \  Current Outpatient Medications   Medication Sig Dispense Refill    atorvastatin (LIPITOR) 40 MG tablet TAKE ONE (1) TABLET BY MOUTH DAILY  90 tablet 1    rOPINIRole (REQUIP) 2 MG tablet TAKE ONE TABLET BY MOUTH EVERY NIGHT AT BEDTIME AND IN THE EVENING AS NEEDED  60 tablet 5    ADVAIR -21 MCG/ACT inhaler INHALE ONE (1) PUFF INTO THE LUNGS TWO (2) TIMES DAILY  12 g 5    glucose monitoring kit (FREESTYLE) monitoring kit 1 kit by Does not apply route daily 1 kit 0    FreeStyle Lancets MISC 1 each by Does not apply route daily 100 each 3    blood glucose test strips (ASCENSIA AUTODISC VI;ONE TOUCH ULTRA TEST VI) strip 1 each by In Vitro route daily As needed.  100 each 3    methocarbamol (ROBAXIN) 750 MG tablet       fluorometholone (FML) 0.1 % ophthalmic suspension       fluticasone (FLONASE) 50 MCG/ACT nasal spray 2 sprays by Each Nostril route daily 1 Bottle 5    azelastine (ASTELIN) 0.1 % nasal spray 2 sprays by Nasal route 2 times daily Use in each nostril as directed 2 Bottle 5    insulin glargine (LANTUS SOLOSTAR) 100 UNIT/ML injection pen Inject 20 Units into the skin nightly 5 pen 0    loratadine (CLARITIN) 10 MG tablet TAKE ONE TABLET BY MOUTH ONCE DAILY  30 tablet 11    benzonatate (TESSALON) 100 MG capsule TAKE ONE (1) CAPSULE BY MOUTH THREE (3) TIMES DAILY AS NEEDED FOR COUGH  30 capsule 0    levothyroxine (SYNTHROID) 100 MCG tablet TAKE ONE TABLET BY MOUTH ONCE DAILY  30 tablet 3     MG tablet TAKE ONE (1) TABLET BY MOUTH TWO (2) TIMES DAILY AS NEEDED FOR PAIN  60 tablet 5    omeprazole (PRILOSEC) 40 MG delayed release capsule TAKE ONE CAPSULE BY MOUTH TWICE A DAY  60 capsule 2    metFORMIN (GLUCOPHAGE-XR) 500 MG extended release tablet TAKE ONE (1) TABLET TWICE DAILY BY MOUTH  60 tablet 5    ezetimibe (ZETIA) 10 MG tablet TAKE ONE (1) TABLET BY MOUTH DAILY  30 tablet 5    ipratropium-albuterol (DUONEB) 0.5-2.5 (3) MG/3ML SOLN nebulizer solution INHALE THREE (3) MLS INTO THE LUNGS EVERY FOUR (4) HOURS AS NEEDED FOR SHORTNESS OF BREATH  360 mL 5    FreeStyle Lancets MISC USE ONCE DAILY  100 each 11    Insulin Pen Needle 32G X 4 MM MISC 1 each by Does not apply route daily 100 each 3    FREESTYLE LITE strip USE DAILY AS NEEDED  50 each 5    B Complex Vitamins (VITAMIN B COMPLEX PO) Take by mouth      Ascorbic Acid (KARTIK-C PO) Take by mouth      estradiol (ESTRACE) 1 MG tablet       albuterol sulfate  (90 Base) MCG/ACT inhaler INHALE 2 PUFFS INTO THE LUNGS EVERY 4 HOURS AS NEEDED FOR WHEEZING 1 Inhaler 5    LATUDA 40 MG TABS tablet       FREESTYLE LITE strip USE DAILY AS NEEDED 100 strip 0    Menthol, Topical Analgesic, 4 % GEL Apply 1 Dose topically 3 times daily To the affected area- the neck 74 mL 3    HYDROcodone-acetaminophen (NORCO) 5-325 MG per tablet       venlafaxine (EFFEXOR XR) 150 MG extended release capsule       diazepam (VALIUM) 5 MG tablet Take 5 mg by mouth daily as needed for Anxiety      fluticasone (FLONASE) 50 MCG/ACT nasal spray 1 spray by Nasal route daily 1 Bottle 3    ESTROGENS CONJUGATED PO Take by mouth      venlafaxine (EFFEXOR-XR) 75 MG XR capsule   Take 150 mg by mouth See Admin Instructions 150mg in morning, 75mg at night      lamotrigine (LAMICTAL) 100 MG tablet Take 100 mg by mouth 2 times daily.       timolol (BETIMOL) 0.5 % ophthalmic female with :    1.T2DM: Uncontrolled, A1c high. Discussed goals, needs to stop regular soda, refer to dietician. Advised may need rapid acting insulin, will try GLP-1 agonist first.  2.Hypothyroidism: TSH at goal  3. HLD: LDL at goal  4. Obesity       Plan:      Lantus 26 units, advised self titration   Add trulicity   continue metformin   Advise to check blood sugar 2 times a day   Patient to send blood sugar log for titration. Advise to low simple carbohydrate and protein with each  meal diet. Diabetes Care: recommend yearly eye exam, foot exam and urine microalbumin to   creatinine ratio. Patient is up-to-date.

## 2021-08-04 RX ORDER — LISINOPRIL 5 MG/1
5 TABLET ORAL DAILY
Qty: 90 TABLET | Refills: 1 | Status: SHIPPED | OUTPATIENT
Start: 2021-08-04 | End: 2021-11-01

## 2021-08-10 ENCOUNTER — OFFICE VISIT (OUTPATIENT)
Dept: PULMONOLOGY | Age: 61
End: 2021-08-10
Payer: COMMERCIAL

## 2021-08-10 ENCOUNTER — HOSPITAL ENCOUNTER (OUTPATIENT)
Dept: CT IMAGING | Age: 61
Discharge: HOME | End: 2021-08-10
Payer: COMMERCIAL

## 2021-08-10 VITALS
BODY MASS INDEX: 34.28 KG/M2 | DIASTOLIC BLOOD PRESSURE: 68 MMHG | HEART RATE: 101 BPM | RESPIRATION RATE: 22 BRPM | SYSTOLIC BLOOD PRESSURE: 130 MMHG | HEIGHT: 60 IN | WEIGHT: 174.6 LBS | TEMPERATURE: 96.9 F | OXYGEN SATURATION: 97 %

## 2021-08-10 DIAGNOSIS — Z87.891 HISTORY OF TOBACCO USE: ICD-10-CM

## 2021-08-10 DIAGNOSIS — G25.81 RLS (RESTLESS LEGS SYNDROME): ICD-10-CM

## 2021-08-10 DIAGNOSIS — J44.9 CHRONIC OBSTRUCTIVE PULMONARY DISEASE, UNSPECIFIED COPD TYPE (HCC): Primary | ICD-10-CM

## 2021-08-10 DIAGNOSIS — Z87.891 PERSONAL HISTORY OF TOBACCO USE: ICD-10-CM

## 2021-08-10 PROCEDURE — 99214 OFFICE O/P EST MOD 30 MIN: CPT | Performed by: INTERNAL MEDICINE

## 2021-08-10 PROCEDURE — 71271 CT THORAX LUNG CANCER SCR C-: CPT

## 2021-08-10 PROCEDURE — G8427 DOCREV CUR MEDS BY ELIG CLIN: HCPCS | Performed by: INTERNAL MEDICINE

## 2021-08-10 PROCEDURE — G8417 CALC BMI ABV UP PARAM F/U: HCPCS | Performed by: INTERNAL MEDICINE

## 2021-08-10 PROCEDURE — 3023F SPIROM DOC REV: CPT | Performed by: INTERNAL MEDICINE

## 2021-08-10 PROCEDURE — G8926 SPIRO NO PERF OR DOC: HCPCS | Performed by: INTERNAL MEDICINE

## 2021-08-10 PROCEDURE — 3017F COLORECTAL CA SCREEN DOC REV: CPT | Performed by: INTERNAL MEDICINE

## 2021-08-10 PROCEDURE — 4004F PT TOBACCO SCREEN RCVD TLK: CPT | Performed by: INTERNAL MEDICINE

## 2021-08-10 RX ORDER — FLUTICASONE PROPIONATE 220 UG/1
1 AEROSOL, METERED RESPIRATORY (INHALATION) 2 TIMES DAILY
Qty: 1 INHALER | Refills: 1 | Status: SHIPPED | OUTPATIENT
Start: 2021-08-10 | End: 2021-09-02 | Stop reason: SDUPTHER

## 2021-08-10 NOTE — PROGRESS NOTES
CHIEF COMPLAINT:  Restless legs syndrome     HPI:   Interval history:   COPD:  Using Advair two puffs twice daily. Having severe muscle spasm/libia horse like pain in abdominal wall, different locations. No idea what is causing. More coughing. Contemplative re: tobacco cessation    RLS: uses Requip with benefit, 2 mg morning, 2 mg evening. Dose not changed. No compulsive behavior. No sleep attacks. Legs occasionally still give trouble, but she is managing. Presenting history:   The patient is a 51 yo woman with hx of tobacco abuse, brain aneurysm, DM presents with chronic dyspnea on exertion. Patient complains of shortness of breath. Symptoms include dyspnea on exertion. Symptoms began a few years ago, gradually worsening since that time. The dyspnea occurs with moderate activity. Patient denies hemoptysis . Aggravating factors include exertion, exercise and climbing stairs. The patient reports an exercise tolerance of approximately 3 blocks on the flat and 1 flight of stairs, limited primarily by dyspnea and leg pain. She smokes 1 ppd x 32 years. She has used spiriva and advair in the past without benefit. Currently using symbicort and albuterol with some response. Initially followed by Dr. Agueda Douglass. reports that she has been smoking cigarettes. She started smoking about 43 years ago. She has a 58.50 pack-year smoking history. She has never used smokeless tobacco.    Objective:   Blood pressure 130/68, pulse 101, temperature 96.9 °F (36.1 °C), resp. rate 22, height 5' (1.524 m), weight 174 lb 9.6 oz (79.2 kg), SpO2 97 %, not currently breastfeeding. Constitutional:  No acute distress. HENT:  Oropharynx is clear and moist.   Neck: No tracheal deviation present. Cardiovascular: Normal heart sounds. No lower extremity edema. Pulmonary/Chest: No wheezes. No rhonchi. No rales. No decreased breath sounds. No accessory muscle usage or stridor. Musculoskeletal: No cyanosis. No clubbing.   Skin: Skin is warm and dry. Psychiatric: Normal mood and affect. Neurologic: speech fluent, alert and oriented, strength symmetric         PFTs 10/5/12  FVC 2.72(88%) FEV1 2.12 (86%) FEV1/FVC ratio  78% TLC 3.82 (83%) DLCO 14.71 (68%)  No BD response  PFT 4/29/14 FEV1 2.07 L 86% TLC 3.69 L (80%) DLCO 13.76 64%  PFT 7/19/18 FEV1 2.03 L 88% TLC 3.91 L 85%   DLCO 12.22 58%    6MWT 1120 feet, low sat 95%    IMAGING:    LDCT 8/10/21  Mediastinum: Coronary artery calcifications are a marker of atherosclerosis. There are no enlarged thoracic lymph nodes.  Calcified granulomatous disease   is noted.       Lungs/pleura: The tracheobronchial tree is patent. Eusebio Levans is no pneumothorax   or pleural effusion. Eusebio Levans is left basilar scarring.       No change in the 2 mm solid left upper lobe pulmonary nodule.  No new   pulmonary nodules have developed.       Upper Abdomen: The liver is diffusely low in attenuation consistent with   hepatic steatosis.  Status post cholecystectomy.       Soft Tissues/Bones: Degenerative changes involve the thoracic spine.       There is a mildly enlarged left axillary lymph node measuring 1.2 x 1.2 cm,   likely reactive.           Impression   1. Unchanged solid subcentimeter left upper lobe pulmonary nodule. Echo 2015  - Left ventricle: The cavity size was normal. Wall thickness  was normal. Systolic function was normal. The estimated  ejection fraction was in the range of 60% to 65%. Wall  motion was normal; there were no regional wall motion  abnormalities. Left ventricular diastolic function  parameters were normal. Pulmonary arteries: PA peak  pressure: 37mm Hg (S).    3/12/17 CXR clear lung fields    ASSESSMENT AND PLAN:  · Pulmonary Centrilobular Emphysema    · Substitute flovent 220 BID for Advair 115/21 2 puffs daily to make sure not causing muscle cramping, albuterol MDI and duonebs; intolerant of Spiriva due to sensation of swelling in throat.      · Restless legs syndrome:   · Requip 2 mg at bedtime and up to 2 mg earlier in day. She is doing well and is stable   · Depression: treated at [facility] in [facility], has been long term treated with Venlafaxine, I asked her to check with [facility] on whether Chantix is option    · Tobacco abuse -    · Smoking more again, as above. Greater than 43 pack year history. Pre-contemplative  · H/O vocal cord polyp. Excised by Dr. Stephanie Santos in 2018. Repeat scope in 2020. · Schedule for LDCT for LCS Aug 2022  · See me in 6 months       Screening CT scan was considered in a lung cancer screening counseling and shared decision making visit today that included the following elements:    Eligibility: Age: 64. There are no signs or symptoms of lung cancer. Tobacco History 43 pack-years, quit zero years ago   Verbal counseling has been performed by me to include benefits and harms of screening, follow-up diagnostic testing, over-diagnosis, false positive rate, and total radiation exposure;    I have counseled on the importance of adherence to annual lung cancer LDCT screening, the impact of comorbidities and patient is willing to undergo diagnosis and treatment;    I have provided counseling on the importance of maintaining cigarette smoking abstinence if former smoker; or the importance of smoking cessation if current smoker and, if appropriate, furnishing of information about tobacco cessation interventions; and    I have furnished a written order for lung cancer screening with LDCT.       Order for Screening chest CT scan should be placed with documentation as below:   Beneficiary date of birth;    Actual pack - year smoking history (number) from above;    Current smoking status, and for former smokers, the number of years since quitting smoking from above  [address] [facility] is asymptomatic   [employer] ([facility]) for Sherri Morfin [id number]

## 2021-08-11 DIAGNOSIS — E03.9 HYPOTHYROIDISM, UNSPECIFIED TYPE: ICD-10-CM

## 2021-08-11 RX ORDER — LEVOTHYROXINE SODIUM 0.1 MG/1
TABLET ORAL
Qty: 30 TABLET | Refills: 3 | Status: SHIPPED | OUTPATIENT
Start: 2021-08-11 | End: 2022-01-10

## 2021-08-11 NOTE — TELEPHONE ENCOUNTER
Last Office Visit  -  5/27/21  Next Office Visit  -  n/a    Last Filled  -    Last UDS -    Contract -

## 2021-08-20 ENCOUNTER — OFFICE VISIT (OUTPATIENT)
Dept: ENDOCRINOLOGY | Age: 61
End: 2021-08-20
Payer: COMMERCIAL

## 2021-08-20 PROCEDURE — 97802 MEDICAL NUTRITION INDIV IN: CPT | Performed by: DIETITIAN, REGISTERED

## 2021-08-23 ENCOUNTER — HOSPITAL ENCOUNTER (OUTPATIENT)
Age: 61
Discharge: HOME | End: 2021-08-23
Payer: COMMERCIAL

## 2021-08-23 LAB
A/G RATIO: 1.3 (ref 1.1–2.2)
ALBUMIN SERPL-MCNC: 4.4 G/DL (ref 3.4–5)
ALP BLD-CCNC: 103 U/L (ref 40–129)
ALT SERPL-CCNC: 39 U/L (ref 10–40)
ANION GAP SERPL CALCULATED.3IONS-SCNC: 14 MMOL/L (ref 3–16)
AST SERPL-CCNC: 72 U/L (ref 15–37)
BILIRUB SERPL-MCNC: <0.2 MG/DL (ref 0–1)
BUN BLDV-MCNC: 8 MG/DL (ref 7–20)
CALCIUM SERPL-MCNC: 9.3 MG/DL (ref 8.3–10.6)
CHLORIDE BLD-SCNC: 96 MMOL/L (ref 99–110)
CHOLESTEROL, FASTING: 200 MG/DL (ref 0–199)
CO2: 21 MMOL/L (ref 21–32)
CREAT SERPL-MCNC: 0.5 MG/DL (ref 0.6–1.1)
FERRITIN: 10.2 NG/ML (ref 15–150)
FOLATE: 8.16 NG/ML (ref 4.78–24.2)
GFR AFRICAN AMERICAN: >60
GFR NON-AFRICAN AMERICAN: >60
GLOBULIN: 3.4 G/DL
GLUCOSE FASTING: 175 MG/DL (ref 70–99)
HDLC SERPL-MCNC: 75 MG/DL (ref 40–60)
IRON: 21 UG/DL (ref 37–145)
LDL CHOLESTEROL CALCULATED: 88 MG/DL
MAGNESIUM: 2.3 MG/DL (ref 1.8–2.4)
PHOSPHORUS: 3.2 MG/DL (ref 2.5–4.9)
POTASSIUM SERPL-SCNC: 4.6 MMOL/L (ref 3.5–5.1)
SODIUM BLD-SCNC: 131 MMOL/L (ref 136–145)
T3 TOTAL: 0.88 NG/ML (ref 0.8–2)
T4 FREE: 1.1 NG/DL (ref 0.9–1.8)
TOTAL PROTEIN: 7.8 G/DL (ref 6.4–8.2)
TRIGLYCERIDE, FASTING: 186 MG/DL (ref 0–150)
TSH SERPL DL<=0.05 MIU/L-ACNC: 2.42 UIU/ML (ref 0.27–4.2)
VITAMIN B-12: 406 PG/ML (ref 211–911)
VITAMIN D 25-HYDROXY: 15.6 NG/ML
VLDLC SERPL CALC-MCNC: 37 MG/DL

## 2021-08-23 PROCEDURE — 83036 HEMOGLOBIN GLYCOSYLATED A1C: CPT

## 2021-08-23 PROCEDURE — 36415 COLL VENOUS BLD VENIPUNCTURE: CPT

## 2021-08-23 PROCEDURE — 84443 ASSAY THYROID STIM HORMONE: CPT

## 2021-08-23 PROCEDURE — 83540 ASSAY OF IRON: CPT

## 2021-08-23 PROCEDURE — 82607 VITAMIN B-12: CPT

## 2021-08-23 PROCEDURE — 85025 COMPLETE CBC W/AUTO DIFF WBC: CPT

## 2021-08-23 PROCEDURE — 82306 VITAMIN D 25 HYDROXY: CPT

## 2021-08-23 PROCEDURE — 80061 LIPID PANEL: CPT

## 2021-08-23 PROCEDURE — 84100 ASSAY OF PHOSPHORUS: CPT

## 2021-08-23 PROCEDURE — 83735 ASSAY OF MAGNESIUM: CPT

## 2021-08-23 PROCEDURE — 80053 COMPREHEN METABOLIC PANEL: CPT

## 2021-08-23 PROCEDURE — 84480 ASSAY TRIIODOTHYRONINE (T3): CPT

## 2021-08-23 PROCEDURE — 82746 ASSAY OF FOLIC ACID SERUM: CPT

## 2021-08-23 PROCEDURE — 82728 ASSAY OF FERRITIN: CPT

## 2021-08-23 PROCEDURE — 84439 ASSAY OF FREE THYROXINE: CPT

## 2021-08-24 LAB
BASOPHILS ABSOLUTE: 0.1 K/UL (ref 0–0.2)
BASOPHILS RELATIVE PERCENT: 1.3 %
EOSINOPHILS ABSOLUTE: 0.5 K/UL (ref 0–0.6)
EOSINOPHILS RELATIVE PERCENT: 4.8 %
ESTIMATED AVERAGE GLUCOSE: 266.1 MG/DL
HBA1C MFR BLD: 10.9 %
HCT VFR BLD CALC: 31.7 % (ref 36–48)
HEMATOLOGY PATH CONSULT: NORMAL
HEMATOLOGY PATH CONSULT: YES
HEMOGLOBIN: 9.9 G/DL (ref 12–16)
LYMPHOCYTES ABSOLUTE: 2.8 K/UL (ref 1–5.1)
LYMPHOCYTES RELATIVE PERCENT: 28.3 %
MCH RBC QN AUTO: 19 PG (ref 26–34)
MCHC RBC AUTO-ENTMCNC: 31.2 G/DL (ref 31–36)
MCV RBC AUTO: 60.7 FL (ref 80–100)
MICROCYTES: ABNORMAL
MONOCYTES ABSOLUTE: 0.6 K/UL (ref 0–1.3)
MONOCYTES RELATIVE PERCENT: 6.4 %
NEUTROPHILS ABSOLUTE: 5.9 K/UL (ref 1.7–7.7)
NEUTROPHILS RELATIVE PERCENT: 59.2 %
OVALOCYTES: ABNORMAL
PDW BLD-RTO: 21.5 % (ref 12.4–15.4)
PLATELET # BLD: 498 K/UL (ref 135–450)
PLATELET SLIDE REVIEW: ABNORMAL
PMV BLD AUTO: 8.5 FL (ref 5–10.5)
RBC # BLD: 5.22 M/UL (ref 4–5.2)
SLIDE REVIEW: ABNORMAL
TARGET CELLS: ABNORMAL
WBC # BLD: 10 K/UL (ref 4–11)

## 2021-08-25 DIAGNOSIS — E78.5 DYSLIPIDEMIA: ICD-10-CM

## 2021-08-25 RX ORDER — EZETIMIBE 10 MG/1
TABLET ORAL
Qty: 30 TABLET | Refills: 5 | Status: SHIPPED | OUTPATIENT
Start: 2021-08-25 | End: 2022-02-16

## 2021-08-26 ENCOUNTER — TELEPHONE (OUTPATIENT)
Dept: FAMILY MEDICINE CLINIC | Age: 61
End: 2021-08-26

## 2021-08-26 NOTE — TELEPHONE ENCOUNTER
----- Message from João Gilliam sent at 8/26/2021  4:14 PM EDT -----  Subject: Message to Provider    QUESTIONS  Information for Provider? patient states her therapist did blood work and   the patient needs to be prescribed an iron supplement   ---------------------------------------------------------------------------  --------------  CALL BACK INFO  What is the best way for the office to contact you? OK to leave message on   voicemail  Preferred Call Back Phone Number? [phone]  ---------------------------------------------------------------------------  --------------  SCRIPT ANSWERS  Relationship to Patient?  Self

## 2021-08-27 ENCOUNTER — TELEPHONE (OUTPATIENT)
Dept: ENDOCRINOLOGY | Age: 61
End: 2021-08-27

## 2021-08-27 NOTE — TELEPHONE ENCOUNTER
Patient has questions regarding a medication and experiencing diarrhea. She would like a call back to discuss.

## 2021-08-30 RX ORDER — QUINIDINE GLUCONATE 324 MG
240 TABLET, EXTENDED RELEASE ORAL
Qty: 90 TABLET | Refills: 0 | Status: SHIPPED | OUTPATIENT
Start: 2021-08-30

## 2021-09-02 ENCOUNTER — TELEPHONE (OUTPATIENT)
Dept: PULMONOLOGY | Age: 61
End: 2021-09-02

## 2021-09-02 RX ORDER — FLUTICASONE PROPIONATE 220 UG/1
1 AEROSOL, METERED RESPIRATORY (INHALATION) 2 TIMES DAILY
Qty: 1 EACH | Refills: 5 | Status: SHIPPED | OUTPATIENT
Start: 2021-09-02 | End: 2022-02-10

## 2021-09-02 NOTE — TELEPHONE ENCOUNTER
Patient will continue on Flovent. She just wanted to let Dr. Garrett Phillip know. Please send script with refills to [facility] [address] in [facility]. LOV: 8/10/21  ASSESSMENT AND PLAN:  · Pulmonary Centrilobular Emphysema    ? Substitute flovent 220 BID for Advair 115/21 2 puffs daily to make sure not causing muscle cramping, albuterol MDI and duonebs; intolerant of Spiriva due to sensation of swelling in throat. · Restless legs syndrome:   ? Requip 2 mg at bedtime and up to 2 mg earlier in day. She is doing well and is stable   · Depression: treated at [facility] in [facility], has been long term treated with Venlafaxine, I asked her to check with [facility] on whether Chantix is option    · Tobacco abuse -    ? Smoking more again, as above. Greater than 43 pack year history. Pre-contemplative  · H/O vocal cord polyp. Excised by Dr. Pastor Miller in 2018. Repeat scope in 2020.   · Schedule for LDCT for [facility] Aug 2022  · See me in 6 months

## 2021-09-20 DIAGNOSIS — E11.00 UNCONTROLLED TYPE 2 DIABETES MELLITUS WITH HYPEROSMOLARITY WITHOUT COMA, WITHOUT LONG-TERM CURRENT USE OF INSULIN (HCC): ICD-10-CM

## 2021-09-20 RX ORDER — METFORMIN HYDROCHLORIDE 500 MG/1
TABLET, EXTENDED RELEASE ORAL
Qty: 60 TABLET | Refills: 5 | Status: SHIPPED | OUTPATIENT
Start: 2021-09-20 | End: 2022-03-08

## 2021-09-22 RX ORDER — DULAGLUTIDE 0.75 MG/.5ML
0.75 INJECTION, SOLUTION SUBCUTANEOUS WEEKLY
Qty: 4 PEN | Refills: 2 | Status: SHIPPED | OUTPATIENT
Start: 2021-09-22 | End: 2021-11-22 | Stop reason: ALTCHOICE

## 2021-09-22 RX ORDER — OMEPRAZOLE 40 MG/1
CAPSULE, DELAYED RELEASE ORAL
Qty: 60 CAPSULE | Refills: 2 | Status: SHIPPED | OUTPATIENT
Start: 2021-09-22 | End: 2022-01-03

## 2021-09-22 NOTE — TELEPHONE ENCOUNTER
Medication:   Requested Prescriptions     Pending Prescriptions Disp Refills    TRULICITY 7.36 KG/5.9IR SOPN [Pharmacy Med Name: Reginald Oliveira 8.48/2.8 SOLN PEN-INJ] 4 pen 2     Sig: INJECT 0.75 MG INTO THE SKIN ONCE A WEEK       Last Filled:      Patient Phone Number: [phone] (home)     Last appt: 8/3/2021   Next appt: 11/24/2021    Last Labs DM:   Lab Results   Component Value Date    LABA1C 10.9 08/23/2021

## 2021-09-27 RX ORDER — IBUPROFEN 800 MG/1
TABLET ORAL
Qty: 60 TABLET | Refills: 5 | Status: SHIPPED | OUTPATIENT
Start: 2021-09-27 | End: 2022-03-16

## 2021-10-15 ENCOUNTER — CLINICAL DOCUMENTATION (OUTPATIENT)
Dept: OTHER | Age: 61
End: 2021-10-15

## 2021-11-01 RX ORDER — LISINOPRIL 5 MG/1
TABLET ORAL
Qty: 90 TABLET | Refills: 1 | Status: SHIPPED | OUTPATIENT
Start: 2021-11-01 | End: 2022-05-12

## 2021-11-01 NOTE — TELEPHONE ENCOUNTER
Medication:   Requested Prescriptions     Pending Prescriptions Disp Refills    lisinopril (PRINIVIL;ZESTRIL) 5 MG tablet [Pharmacy Med Name: LISINOPRIL 5MG TABLET] 90 tablet 1     Sig: TAKE ONE (1) TABLET BY MOUTH DAILY       Last Filled:      Patient Phone Number: [phone] (home)     Last appt: 8/3/2021   Next appt: 11/24/2021    Last Labs DM:   Lab Results   Component Value Date    LABA1C 10.9 08/23/2021     Last Lipid:   Lab Results   Component Value Date    CHOL 139 01/26/2021    TRIG 201 01/26/2021    HDL 75 08/23/2021    HDL 62 12/27/2010    LDLCALC 88 08/23/2021     Last PSA: No results found for: PSA  Last Thyroid:   Lab Results   Component Value Date    TSH 2.42 08/23/2021    K8LBMGR 0.88 08/23/2021    T4FREE 1.1 08/23/2021    D3NRQRL 7.2 01/11/2013

## 2021-11-05 ENCOUNTER — TELEPHONE (OUTPATIENT)
Dept: FAMILY MEDICINE CLINIC | Age: 61
End: 2021-11-05

## 2021-11-05 NOTE — TELEPHONE ENCOUNTER
----- Message from Flat[employer] sent at 11/5/2021  1:00 PM EDT -----  Subject: Appointment Request    Reason for Call: Routine (Patient Request) No Script    QUESTIONS  Type of Appointment? Established Patient  Reason for appointment request? Available appointments did not meet   patient need  Additional Information for Provider? pt has lab results to discuss with    and would like an in person visit.   ---------------------------------------------------------------------------  --------------  [address]  What is the best way for the office to contact you? OK to leave message on   voicemail  Preferred Call Back Phone Number? [phone]  ---------------------------------------------------------------------------  --------------  SCRIPT ANSWERS  Relationship to Patient? Self  (Is the patient requesting to see the provider for a procedure?)? No  (Is the patient requesting to see the provider urgently  today or   tomorrow. )? No  Have you been diagnosed with, awaiting test results for, or told that you   are suspected of having COVID-19 (Coronavirus)? (If patient has tested   negative or was tested as a requirement for work, school, or travel and   not based on symptoms, answer no)? No  Within the past two weeks have you developed any of the following symptoms   (answer no if symptoms have been present longer than 2 weeks or began   more than 2 weeks ago)? Fever or Chills, Cough, Shortness of breath or   difficulty breathing, Loss of taste or smell, Sore throat, Nasal   congestion, Sneezing or runny nose, Fatigue or generalized body aches   (answer no if pain is specific to a body part e.g. back pain), Diarrhea,   Headache? No  Have you had close contact with someone with COVID-19 in the last 14 days? No  (Service Expert  click yes below to proceed with [employer] As Usual   Scheduling)?  Yes

## 2021-11-06 DIAGNOSIS — E11.00 UNCONTROLLED TYPE 2 DIABETES MELLITUS WITH HYPEROSMOLARITY WITHOUT COMA, WITHOUT LONG-TERM CURRENT USE OF INSULIN (HCC): ICD-10-CM

## 2021-11-08 RX ORDER — INSULIN GLARGINE 100 [IU]/ML
INJECTION, SOLUTION SUBCUTANEOUS
Qty: 15 ML | Refills: 2 | Status: SHIPPED | OUTPATIENT
Start: 2021-11-08 | End: 2022-03-10

## 2021-11-08 NOTE — TELEPHONE ENCOUNTER
Medication:   Requested Prescriptions     Pending Prescriptions Disp Refills    insulin glargine (LANTUS SOLOSTAR) 100 UNIT/ML injection pen [Pharmacy Med Name: Alfonza Gaucher 100/ML SOLN PEN-INJ] 15 mL 2     Sig: INJECT 26-36 UNITS UNDER THE SKIN ONCE DAILY       Last Filled:      Patient Phone Number: [phone] (home)     Last appt: 8/3/2021   Next appt: 11/24/2021    Last Labs DM:   Lab Results   Component Value Date    LABA1C 10.9 08/23/2021

## 2021-11-11 RX ORDER — PRAVASTATIN SODIUM 40 MG
TABLET ORAL
Qty: 30 TABLET | Refills: 5 | Status: SHIPPED | OUTPATIENT
Start: 2021-11-11

## 2021-11-16 NOTE — PLAN OF CARE
[address]  [address], [place] [address] [facility], [address] [facility] [address]  Phone: [phone]   Fax:     [phone]     Physical Therapy Certification    Dear   Elizabeth Wolfe MD,    We had the pleasure of evaluating the following patient for physical therapy services at [address] [place] [address]. A summary of our findings can be found in the initial assessment below. This includes our plan of care. If you have any questions or concerns regarding these findings, please do not hesitate to contact me at the office phone number checked above. Thank you for the referral.       Physician Signature:_______________________________Date:__________________  By signing above (or electronic signature), therapists plan is approved by physician      Patient: Delta Hansen   : 1960   MRN: [id number]  Referring Physician:   Elizabeth Wolfe MD      Evaluation Date: 2021      Medical Diagnosis Information:   M51.36 Lumbar DDD                                             Insurance information:   [employer] [place] [employer]     Precautions/ Contra-indications: none      C-SSRS Triggered by Intake questionnaire (Past 2 wk assessment):   [x] No, Questionnaire did not trigger screening.   [] Yes, Patient intake triggered further evaluation      [] C-SSRS Screening completed  [] PCP notified via Plan of Care  [] Emergency services notified     Latex Allergy:  [x]NO      []YES  Preferred Language for Healthcare:   [x]English       []other:    SUBJECTIVE: Patient states insidious onset LBP for 10 years. History of ERNIE with no relief.      Relevant Medical History:DM, HTN   Functional Disability Index/G-Codes:    Oswestry 22%     Pain Scale: 4-7/10  Easing factors: SL   Provocative factors: vacuum mopping    Type: []Constant   [x]Intermittent  []Radiating []Localized []other:     Numbness/Tingling: none    Occupation/School: disabled    Living Status/Prior Level of Function: Independent with ADLs and IADLs,     OBJECTIVE:     ROM  Comments   Lumbar Flex 50%    Lumbar Ext 50%      ROM LEFT RIGHT Comments   Lumbar Side Bend      Lumbar Rotation      Hip Flexion      Hip Abd      Hip ER      Hip IR      Hip Extension      Knee Ext      Knee Flex      Hamstring Flex      Piriformis                    Strength LEFT RIGHT Comments   Multifidus      Transverse Ab      Hip Flexors 5- 5-    Hip Abductors      Hip Extensors      knee 5 5    DF 5 5       Myotomes Normal Abnormal Comments   Hip flexion (L1-L2)      Knee extension (L2-L4)      Dorsiflexion (L4-L5)      Great Toe Ext (L5)      Ankle Eversion (S1-S2)      Ankle PF(S1-S2)        Dermatomes Normal Abnormal Comments   inguinal area (L1)       anterior mid-thigh (L2)      distal ant thigh/med knee (L3)      medial lower leg and foot (L4)      lateral lower leg and foot (L5)      posterior calf (S1)      medial calcaneus (S2)        Neural dynamic tension testing Normal Abnormal Comments   Slump Test  - Degree of knee flexion:       SLR       0-30      30-70      Femoral nerve (L2-4)        Reflexes Normal Abnormal Comments   S1-2 Seated achilles      S1-2 Prone knee bend      L3-4 Patellar tendon      C5-6 Biceps      C6 Brachioradialis      C7-8 Triceps      Clonus      Babinski      Alexander's        Joint mobility:    [x]Normal    []Hypo   []Hyper    Palpation: R lumbar paraspinals    Functional Mobility/Transfers: WFL    Posture: flexed    Gait: (include devices/WB status) WFL    Bandages/Dressings/Incisions: NA    Orthopedic Special Tests:stand 4/10.  Flex x10:  5/10  Ext x10: 5/10  Supine 6/10  Flex x 3 7/10  Prone 6/10  YVONNE 5/10 PU: 3/10   Normal Abnormal N/A Comments   Toe walk         Heel Walk       Fwd Bend-aberrant or innominate mvmt)       Trendelenburg       Kemps/Radha Jollyk       ODELL/Shane       Hip scour       SLR  x     Crossed SLR  x     Supine to sit Hip thrust       SI distraction/compression       PA/Spring       Prone Instability test       Prone knee bend       Sacral Spring/thrust                  [x] Patient history, allergies, meds reviewed. Medical chart reviewed. See intake form. Review Of Systems (ROS):  [x]Performed Review of systems (Integumentary, CardioPulmonary, Neurological) by intake and observation. Intake form has been scanned into medical record. Patient has been instructed to contact their primary care physician regarding ROS issues if not already being addressed at this time. Co-morbidities/Complexities (which will affect course of rehabilitation):   []None           Arthritic conditions   []Rheumatoid arthritis (M05.9)  []Osteoarthritis (M19.91)   Cardiovascular conditions   [x]Hypertension (I10)  []Hyperlipidemia (E78.5)  []Angina pectoris (I20)  []Atherosclerosis (I70)   Musculoskeletal conditions   []Disc pathology   []Congenital spine pathologies   []Prior surgical intervention  []Osteoporosis (M81.8)  []Osteopenia (M85.8)   Endocrine conditions   []Hypothyroid (E03.9)  []Hyperthyroid Gastrointestinal conditions   []Constipation (F61.71)   Metabolic conditions   []Morbid obesity (E66.01)  [x]Diabetes type 1(E10.65) or 2 (E11.65)   []Neuropathy (G60.9)     Pulmonary conditions   []Asthma (J45)  []Coughing   []COPD (J44.9)   Psychological Disorders  []Anxiety (F41.9)  [x]Depression (F32.9)   []Other:   []Other:           Barriers to/and or personal factors that will affect rehab potential:              []Age  []Sex              []Motivation/Lack of Motivation                        [x]Co-Morbidities              []Cognitive Function, education/learning barriers              []Environmental, home barriers              []profession/work barriers  [x]past PT/medical experience  []other:  Justification:     Falls Risk Assessment (30 days):   [x] Falls Risk assessed and no intervention required.   [] Falls Risk assessed and Patient requires intervention due to being higher risk   TUG score (>12s at risk):     [] Falls education provided, including       G-Codes:    Oswestry 22%     ASSESSMENT:   Functional Impairments:     []Noted lumbar/proximal hip hypomobility   []Noted lumbosacral and/or generalized hypermobility   [x]Decreased Lumbosacral/hip/LE functional ROM   [x]Decreased core/proximal hip strength and neuromuscular control    [x]Decreased LE functional strength    []Abnormal reflexes/sensation/myotomal/dermatomal deficits  [x]Reduced balance/proprioceptive control    []other:      Functional Activity Limitations (from functional questionnaire and intake)   [x]Reduced ability to tolerate prolonged functional positions   [x]Reduced ability or difficulty with changes of positions or transfers between positions   [x]Reduced ability to maintain good posture and demonstrate good body mechanics with sitting, bending, and lifting   [x]Reduced ability to sleep   [x] Reduced ability or tolerance with driving and/or computer work   [x]Reduced ability to perform lifting, reaching, carrying tasks   [x]Reduced ability to squat   [x]Reduced ability to forward bend   [x]Reduced ability to ambulate prolonged functional periods/distances/surfaces   [x]Reduced ability to ascend/descend stairs   []other:       Participation Restrictions   [x]Reduced participation in self care activities   [x]Reduced participation in home management activities   [x]Reduced participation in work activities   []Reduced participation in social activities. []Reduced participation in sport/recreational activities. Classification:   []Signs/symptoms consistent with Lumbar instability/stabilization subgroup. []Signs/symptoms consistent with Lumbar mobilization/manipulation subgroup, myotomes and dermatomes intact. Meets manipulation criteria.     [x]Signs/symptoms consistent with Lumbar direction specific/centralization subgroup   []Signs/symptoms consistent with Lumbar traction subgroup     []Signs/symptoms consistent with lumbar facet dysfunction   []Signs/symptoms consistent with lumbar stenosis type dysfunction   []Signs/symptoms consistent with nerve root involvement including myotome & dermatome dysfunction   []Signs/symptoms consistent with post-surgical status including: decreased ROM, strength and function. []signs/symptoms consistent with pathology which may benefit from Dry needling     []other:      Prognosis/Rehab Potential:      []Excellent   [x]Good    []Fair   []Poor    Tolerance of evaluation/treatment:    []Excellent   [x]Good    []Fair   []Poor     Physical Therapy Evaluation Complexity Justification  [x] A history of present problem with:  [] no personal factors and/or comorbidities that impact the plan of care;  [x]1-2 personal factors and/or comorbidities that impact the plan of care  []3 personal factors and/or comorbidities that impact the plan of care  [x] An examination of body systems using standardized tests and measures addressing any of the following: body structures and functions (impairments), activity limitations, and/or participation restrictions;:  [x] a total of 1-2 or more elements   [] a total of 3 or more elements   [] a total of 4 or more elements   [x] A clinical presentation with:  [x] stable and/or uncomplicated characteristics   [] evolving clinical presentation with changing characteristics  [] unstable and unpredictable characteristics;   [x] Clinical decision making of [x] low, [] moderate, [] high complexity using standardized patient assessment instrument and/or measurable assessment of functional outcome.     [x] EVAL (LOW) 36058 (typically 20 minutes face-to-face)  [] EVAL (MOD) 25693 (typically 30 minutes face-to-face)  [] EVAL (HIGH) 29338 (typically 45 minutes face-to-face)  [] RE-EVAL     PLAN: Begin PT focusing on: proximal hip mobilizations, LB mobs, LB core activation, proximal hip activation, and HEP    Frequency/Duration: 1-2 days per week for 6-8 Weeks:  Interventions:  [x]  Therapeutic exercise including: strength training, ROM, for LE, Glutes and core   [x]  NMR activation and proprioception for glutes , LE and Core   [x]  Manual therapy as indicated for Hip complex, LE and spine to include: Dry Needling/IASTM, STM, PROM, Gr I-IV mobilizations, manipulation. [x]  Modalities as needed that may include: thermal agents, E-stim, Biofeedback, US, iontophoresis as indicated  [x]  Patient education on joint protection, postural re-education, activity modification, progression of HEP. HEP instruction: Refer to [address] [place] [address] access code and exercises on the 1st visit treatment note    GOALS:  Patient stated goal: sit walk    Therapist goals for Patient:   Short Term Goals: To be achieved in: 2 weeks  1. Independent in HEP and progression per patient tolerance, in order to prevent re-injury. [x] Progressing: [] Met: [] Not Met: [] Adjusted  2. Patient will have a decrease in pain to facilitate improvement in movement, function, and ADLs as indicated by Functional Deficits. [x] Progressing: [] Met: [] Not Met: [] Adjusted    Long Term Goals: To be achieved in: 6-8 weeks  1. Disability index score of 11% or less for the KIN to assist with reaching prior level of function. [x] Progressing: [] Met: [] Not Met: [] Adjusted  2. Patient will demonstrate increased AROM to WNL, good LS mobility, good hip ROM to allow for proper joint functioning as indicated by patients Functional Deficits. [x] Progressing: [] Met: [] Not Met: [] Adjusted  3. Patient will demonstrate an increase in Strength to good proximal hip and core activation to allow for proper functional mobility as indicated by patients Functional Deficits. [x] Progressing: [] Met: [] Not Met: [] Adjusted  4. Patient will return to [facility] for  functional activities without increased symptoms or restriction. [x] Progressing: [] Met: [] Not Met: [] Adjusted  5.  Sit/ walk with min to no limitations (patient specific functional goal)    [x] Progressing: [] Met: [] Not Met: [] Adjusted     Electronically signed by:  Kojo Campbell, PT

## 2021-11-16 NOTE — FLOWSHEET NOTE
[address] [facility], [address] [place] [address] [address]  Phone: [phone]   Fax:     [phone]      Physical Therapy Treatment Note/ Progress Report:       Date:  2021    Patient Name:  Migdalia Breen    :  1960  MRN: [id number]  Restrictions/Precautions:    Medical/Treatment Diagnosis Information:    M51.36 Lumbar DDD   ·   ·    Insurance/Certification information:    [address]  Physician Information:    Nereyda Bradley MD  Has the plan of care been signed (Y/N):        []  Yes  [x]  No     Date of Patient follow up with Physician: NS    Is this a Progress Report:     []  Yes  [x]  No      If Yes:  Date Range for reporting period:  Beginnin21 ------------ Endin21    Progress report will be due (10 Rx or 30 days whichever is less):      Recertification will be due (POC Duration  / 90 days whichever is less): 22      Visit # Insurance Allowable Auth Required   In Person 1 32 units []  Yes     []  No    Tele Health 0  []  Yes     []  No    Total 1       Functional Scale: Oswestry 22%   Date assessed:  21      Latex Allergy:  [x]NO      []YES  Preferred Language for Healthcare:   [x]English       []other:    Pain level:  4-7/10     SUBJECTIVE:  See eval    OBJECTIVE: See eval   Observation:    Test measurements:      RESTRICTIONS/PRECAUTIONS: none    Exercises/Interventions:   Therapeutic Ex (31512) Sets/sec Reps Notes/CUES HEP   YVONNE/PU  5 min  x   Prone glut sets  15x  x   Prone buttock kicks  15x  x                                                                  Manual Intervention (04844)                                                 NMR re-education (62439)   CUES NEEDED    PB  rows RD 15x  x                               Pt educ Dx posture etc  8 min                          Therapeutic Activity (51135)                                          Medbridge access code: [email or web address]           Therapeutic Exercise and NMR EXR  [x] (48703) Provided verbal/tactile cueing for activities related to strengthening, flexibility, endurance, ROM  for improvements in proximal hip and core control with self care, mobility, lifting and ambulation.  [] (93505) Provided verbal/tactile cueing for activities related to improving balance, coordination, kinesthetic sense, posture, motor skill, proprioception  to assist with core control in self care, mobility, lifting, and ambulation. Therapeutic Activities:    [x] (96777 or 62701) Provided verbal/tactile cueing for activities related to improving balance, coordination, kinesthetic sense, posture, motor skill, proprioception and motor activation to allow for proper function  with self care and ADLs  [] (92038) Provided training and instruction to the patient for proper core and proximal hip recruitment and positioning with ambulation re-education     Home Exercise Program:    [x] (29367) Reviewed/Progressed HEP activities related to strengthening, flexibility, endurance, ROM of core, proximal hip and LE for functional self-care, mobility, lifting and ambulation   [] (83585) Reviewed/Progressed HEP activities related to improving balance, coordination, kinesthetic sense, posture, motor skill, proprioception of core, proximal hip and LE for self care, mobility, lifting, and ambulation      Manual Treatments:  PROM / STM / Oscillations-Mobs:  G-I, II, III, IV (PA's, Inf., Post.)  [x] (92262) Provided manual therapy to mobilize proximal hip and LS spine soft tissue/joints for the purpose of modulating pain, promoting relaxation,  increasing ROM, reducing/eliminating soft tissue swelling/inflammation/restriction, improving soft tissue extensibility and allowing for proper ROM for normal function with self care, mobility, lifting and ambulation. Modalities:     [] GAME READY (VASO)- for significant edema, swelling, pain control. Charges:  Timed Code Treatment Minutes: 25   Total Treatment Minutes:  45   BWC:  TE TIME:  NMR TIME:  MANUAL TIME:  UNTIMED MINUTES:  Medicare Total:   1/32 1/32 0/32          [x] EVAL (LOW) 48964 (typically 20 minutes face-to-face)  [] EVAL (MOD) 15206 (typically 30 minutes face-to-face)  [] EVAL (HIGH) 29089 (typically 45 minutes face-to-face)  [] RE-EVAL     [x] GY(36907) x   1  [] IONTO  [x] NMR (45538) x 1    [] VASO  [] Manual (38484) x     [] Other:  [] TA x      [] Mech Traction (29677)  [] ES(attended) (74865)      [] ES (un) (74125):       ASSESSMENT:  See eval      GOALS:     Patient stated goal: sit walk    Therapist goals for Patient:   Short Term Goals: To be achieved in: 2 weeks  1. Independent in HEP and progression per patient tolerance, in order to prevent re-injury. [x] Progressing: [] Met: [] Not Met: [] Adjusted  2. Patient will have a decrease in pain to facilitate improvement in movement, function, and ADLs as indicated by Functional Deficits. [x] Progressing: [] Met: [] Not Met: [] Adjusted    Long Term Goals: To be achieved in: 6-8 weeks  1. Disability index score of 11% or less for the KIN to assist with reaching prior level of function. [x] Progressing: [] Met: [] Not Met: [] Adjusted  2. Patient will demonstrate increased AROM to WNL, good LS mobility, good hip ROM to allow for proper joint functioning as indicated by patients Functional Deficits. [x] Progressing: [] Met: [] Not Met: [] Adjusted  3. Patient will demonstrate an increase in Strength to good proximal hip and core activation to allow for proper functional mobility as indicated by patients Functional Deficits. [x] Progressing: [] Met: [] Not Met: [] Adjusted  4. Patient will return to [facility] for  functional activities without increased symptoms or restriction. [x] Progressing: [] Met: [] Not Met: [] Adjusted  5.  Sit/ walk with min to no limitations (patient specific functional goal)    [x] Progressing: [] Met: [] Not Met: [] Adjusted           Overall Progression Towards Functional goals/ Treatment Progress Update:  [] Patient is progressing as expected towards functional goals listed. [] Progression is slowed due to complexities/Impairments listed. [] Progression has been slowed due to co-morbidities. [x] Plan just implemented, too soon to assess goals progression <30days   [] Goals require adjustment due to lack of progress  [] Patient is not progressing as expected and requires additional follow up with physician  [] Other    Prognosis for POC: [x] Good [] Fair  [] Poor      Patient requires continued skilled intervention: [x] Yes  [] No    Treatment/Activity Tolerance:  [x] Patient able to complete treatment  [] Patient limited by fatigue  [] Patient limited by pain    [] Patient limited by other medical complications  [] Other:     Return to Play: (if applicable)   []  Stage 1: Intro to Strength   []  Stage 2: Return to Run and Strength   []  Stage 3: Return to Jump and Strength   []  Stage 4: Dynamic Strength and Agility   []  Stage 5: Sport Specific Training     []  Ready to Return to Play, Meets All Above Stages   [x]  Not Ready for Return to Sports   Comments:                           PLAN: See eval  [] Continue per plan of care [] Alter current plan (see comments above)  [x] Plan of care initiated [] Hold pending MD visit [] Discharge    Electronically signed by:  Melody Reyes PT    Note: If patient does not return for scheduled/ recommended follow up visits, this note will serve as a discharge from care along with most recent update on progress.

## 2021-11-17 ENCOUNTER — HOSPITAL ENCOUNTER (OUTPATIENT)
Dept: PHYSICAL THERAPY | Age: 61
Setting detail: THERAPIES SERIES
Discharge: HOME | End: 2021-11-17
Payer: COMMERCIAL

## 2021-11-17 PROCEDURE — 97112 NEUROMUSCULAR REEDUCATION: CPT | Performed by: SPECIALIST

## 2021-11-17 PROCEDURE — 97161 PT EVAL LOW COMPLEX 20 MIN: CPT | Performed by: SPECIALIST

## 2021-11-17 PROCEDURE — 97110 THERAPEUTIC EXERCISES: CPT | Performed by: SPECIALIST

## 2021-11-18 NOTE — PROGRESS NOTES
Medical Nutrition Therapy for Diabetes    Deisy Carter  November 18, 2021      Patient Care Team:  Marysol Brennan MD as PCP - General (Family Medicine)  Audra Dance, DO as PCP - OBGYN (Obstetrics & Gynecology)  Marysol Brennan MD as PCP - [facility] Empaneled Provider  Angelica Palencia MD as Consulting Physician (Pulmonology)  Zachary Knapp MD as Consulting Physician (Gynecologic Oncology)  Katie Peguero RN as Nurse Navigator    Reason for visit: uncontrolled, type 2 Diabetes    ASSESSMENT/PLAN:   NUTRITION DIAGNOSIS  Follow up    #1 Problem: Altered Nutrition-Related Laboratory Values (NC-2.2)  Related to: Endocrine/Diabetes   As Evidenced by: Elevated Plasma glucose and/or HgbA1c levels         #2 Problem: Inconsistent Carbohydrate and Fiber Intake  Related to: Food- and nutrition-related knowledge deficit concerning appropriate amount of carbohydrate and fiber intake  As evidenced by: patient food recall      #3 Problem: Excessive Carbohydrate Intake (NI-5.8. 2)  Related to: Food-and nutrition-related knowledge deficit concerning appropriate amount of carbohydrate intake  As evidenced by: Estimated carbohydrate intake that is consistently more than the recommended amounts    NUTRITION INTERVENTION  Nutrition Prescription: 3 meals,  30 grams carbohydrate per meal with protein and non-starch vegetables  15 gram carbohydrate snacks    Diabetes Education/Counseling included:  Discussed benefits of having small portion of carbohydrate with protein. Interventions: Congratulated for reduced Pepsi volume. Control Carbohydrate Intake using Plate Guide, Control Carbohydrate Intake using Carb Counting and Increase intake of whole grains  Encouraged consistent inclusion of carbohydrate each meal.  Encouraged continue good water intake.   Handouts: Sample menus-[employer], Planning Healthy Meals-[employer]  NUTRITION MONITORING AND EVALUATION  Continue 3 meals  30 gram carbohydrate meals  Continue reducing Pepsi volume         Patient Active Problem List   Diagnosis    TIA (transient ischemic attack)    Acquired hypothyroidism    Dyspnea    Tobacco abuse    Abnormal PFT    Centrilobular emphysema (HCC)    Excessive sleepiness    Abnormal finding on Pap smear, HPV DNA positive    Restless leg syndrome    Leg pain    Diabetic polyneuropathy ([address]    Uncontrolled type 2 diabetes mellitus with hyperosmolarity without coma, without long-term current use of insulin (HCC)    Restless legs syndrome (RLS)    Low grade squamous intraepith lesion on cytologic smear vagina (lgsil)    Ovarian cyst, left    Fracture of coccyx (HCC)    Fracture of spinal vertebra    Hip pain    Cataract    Glaucoma    Viral upper respiratory tract infection    Migraine with aura and without status migrainosus, not intractable    Essential hypertension    Moderate episode of recurrent major depressive disorder (HCC)    Bilateral carotid artery disease (HCC)    Neck pain of over 3 months duration    New persistent daily headache    Cervical disc disorder of mid-cervical region    Headache, cervicogenic    HTN (hypertension), benign    Dysthymia    Uncontrolled type 2 diabetes mellitus with complication, without long-term current use of insulin (HCC)    Dyslipidemia    COPD (chronic obstructive pulmonary disease) (HCC)    Obesity    Displacement of lumbar intervertebral disc without myelopathy    Internal derangement of right knee    Lumbosacral spondylosis without myelopathy    Sesamoiditis    Synovitis    Valgus deformity of great toe       Current Outpatient Medications   Medication Sig Dispense Refill    pravastatin (PRAVACHOL) 40 MG tablet TAKE ONE TABLET BY MOUTH EACH EVENING 30 tablet 5    insulin glargine (LANTUS SOLOSTAR) 100 UNIT/ML injection pen INJECT 26-36 UNITS UNDER THE SKIN ONCE DAILY 15 mL 2    lisinopril (PRINIVIL;ZESTRIL) 5 MG tablet TAKE ONE (1) TABLET BY MOUTH DAILY  90 tablet 1    ibuprofen (ADVIL;MOTRIN) 800 MG tablet TAKE ONE (1) TABLET BY MOUTH TWO (2) TIMES DAILY AS NEEDED FOR PAIN  60 tablet 5    TRULICITY 6.93 VU/2.0TB SOPN INJECT 0.75 MG INTO THE SKIN ONCE A WEEK  4 pen 2    omeprazole (PRILOSEC) 40 MG delayed release capsule TAKE ONE CAPSULE BY MOUTH TWICE A DAY  60 capsule 2    metFORMIN (GLUCOPHAGE-XR) 500 MG extended release tablet TAKE ONE (1) TABLET TWICE DAILY BY MOUTH  60 tablet 5    fluticasone (FLOVENT HFA) 220 MCG/ACT inhaler Inhale 1 puff into the lungs 2 times daily 1 each 5    ferrous gluconate (FERGON) 240 (27 Fe) MG tablet Take 1 tablet by mouth 3 times daily (with meals) 90 tablet 0    ezetimibe (ZETIA) 10 MG tablet TAKE ONE (1) TABLET BY MOUTH DAILY  30 tablet 5    levothyroxine (SYNTHROID) 100 MCG tablet TAKE ONE TABLET BY MOUTH ONCE DAILY  30 tablet 3    atorvastatin (LIPITOR) 40 MG tablet TAKE ONE (1) TABLET BY MOUTH DAILY  90 tablet 1    rOPINIRole (REQUIP) 2 MG tablet TAKE ONE TABLET BY MOUTH EVERY NIGHT AT BEDTIME AND IN THE EVENING AS NEEDED  60 tablet 5    ADVAIR -21 MCG/ACT inhaler INHALE ONE (1) PUFF INTO THE LUNGS TWO (2) TIMES DAILY  12 g 5    glucose monitoring kit (FREESTYLE) monitoring kit 1 kit by Does not apply route daily 1 kit 0    FreeStyle Lancets MISC 1 each by Does not apply route daily 100 each 3    blood glucose test strips (ASCENSIA AUTODISC VI;ONE TOUCH ULTRA TEST VI) strip 1 each by In Vitro route daily As needed.  100 each 3    methocarbamol (ROBAXIN) 750 MG tablet       fluorometholone (FML) 0.1 % ophthalmic suspension       fluticasone (FLONASE) 50 MCG/ACT nasal spray 2 sprays by Each Nostril route daily 1 Bottle 5    azelastine (ASTELIN) 0.1 % nasal spray 2 sprays by Nasal route 2 times daily Use in each nostril as directed 2 Bottle 5    loratadine (CLARITIN) 10 MG tablet TAKE ONE TABLET BY MOUTH ONCE DAILY  30 tablet 11    benzonatate (TESSALON) 100 MG capsule TAKE ONE (1) CAPSULE BY MOUTH THREE (3) TIMES DAILY AS NEEDED FOR COUGH  30 capsule 0    ipratropium-albuterol (DUONEB) 0.5-2.5 (3) MG/3ML SOLN nebulizer solution INHALE THREE (3) MLS INTO THE LUNGS EVERY FOUR (4) HOURS AS NEEDED FOR SHORTNESS OF BREATH  360 mL 5    FreeStyle Lancets MISC USE ONCE DAILY  100 each 11    Insulin Pen Needle 32G X 4 MM MISC 1 each by Does not apply route daily 100 each 3    FREESTYLE LITE strip USE DAILY AS NEEDED  50 each 5    B Complex Vitamins (VITAMIN B COMPLEX PO) Take by mouth      Ascorbic Acid (KARTIK-C PO) Take by mouth      estradiol (ESTRACE) 1 MG tablet       albuterol (PROVENTIL) (2.5 MG/3ML) 0.083% nebulizer solution Take 3 mLs by nebulization every 4 hours as needed for Wheezing 375 mL 5    albuterol sulfate  (90 Base) MCG/ACT inhaler INHALE 2 PUFFS INTO THE LUNGS EVERY 4 HOURS AS NEEDED FOR WHEEZING 1 Inhaler 5    LATUDA 40 MG TABS tablet       FREESTYLE LITE strip USE DAILY AS NEEDED 100 strip 0    Menthol, Topical Analgesic, 4 % GEL Apply 1 Dose topically 3 times daily To the affected area- the neck 74 mL 3    HYDROcodone-acetaminophen (NORCO) 5-325 MG per tablet       venlafaxine (EFFEXOR XR) 150 MG extended release capsule       diazepam (VALIUM) 5 MG tablet Take 5 mg by mouth daily as needed for Anxiety      fluticasone (FLONASE) 50 MCG/ACT nasal spray 1 spray by Nasal route daily 1 Bottle 3    ESTROGENS CONJUGATED PO Take by mouth      venlafaxine (EFFEXOR-XR) 75 MG XR capsule   Take 150 mg by mouth See Admin Instructions 150mg in morning, 75mg at night      lamotrigine (LAMICTAL) 100 MG tablet Take 100 mg by mouth 2 times daily.  timolol (BETIMOL) 0.5 % ophthalmic solution 1 drop 2 times daily.  brimonidine (ALPHAGAN P) 0.15 % ophthalmic solution 1 drop 2 times daily. No current facility-administered medications for this visit.          NUTRITION ASSESSMENT    Biochemical Data:    Lab Results   Component Value Date    LABA1C 10.9 08/23/2021     Lab Results   Component Value Date    EAG 266.1 08/23/2021       Lab Results   Component Value Date    CHOL 139 01/26/2021    CHOL 307 (H) 09/21/2020    CHOL 225 (H) 05/12/2017     Lab Results   Component Value Date    TRIG 201 (H) 01/26/2021    TRIG 869 (H) 09/21/2020    TRIG 352 (H) 05/12/2017     Lab Results   Component Value Date    HDL 75 (H) 08/23/2021    HDL 58 01/26/2021    HDL 49 09/21/2020     Lab Results   Component Value Date    LDLCALC 88 08/23/2021    LDLCALC 41 01/26/2021    [address] see below 09/21/2020     Lab Results   Component Value Date    LABVLDL 37 08/23/2021    LABVLDL 40 01/26/2021    LABVLDL see below 09/21/2020     No results found for: [facility]    Lab Results   Component Value Date    WBC 10.0 08/23/2021    HGB 9.9 (L) 08/23/2021    HCT 31.7 (L) 08/23/2021    MCV 60.7 (L) 08/23/2021     (H) 08/23/2021       Lab Results   Component Value Date    CREATININE 0.5 (L) 08/23/2021    BUN 8 08/23/2021     (L) 08/23/2021    K 4.6 08/23/2021    CL 96 (L) 08/23/2021    CO2 21 08/23/2021       Diabetes Medications: Yes  Knows name and dose of prescribed medications Yes  Knows prescribed schedule for medicationsYes  Recent change in medication type/dosage: No  Stores  medications properlyYes  Comments:     Monitoring:   Has BG meter: Yes  Testing frequency: 4 weekly  Recent results: fasting 130-140, ac dinner 230  Hypoglycemia? No    Anthropometric Measurements:   Wt:   Wt Readings from Last 3 Encounters:   08/10/21 174 lb 9.6 oz (79.2 kg)   08/03/21 175 lb (79.4 kg)   05/27/21 176 lb (79.8 kg)      BMI:   BMI Readings from Last 3 Encounters:   08/10/21 34.10 kg/m²   08/03/21 34.18 kg/m²   05/27/21 34.37 kg/m²     Patient's stated goal weight:   7% Weight loss goal weight:     Physical Activity History:   Physical activity: walking  Frequency of activity: routine  Duration of activity: irregular  Obstacles to activity: none    Sleep: naps 1 hour daily    Food and Nutrition History:   Nutrition Awareness/Previous DSMES: yes  Number of people in household: 1  Frequency of Meals Eaten away from home:rare  Food Availability Problems  Within the past 12 months, have you worried that your food would run out before you got money to buy more? No  Within the past 12 months, has the food you bought not lasted till the end of the month and you didn't have money to get more? No  Beverage consumption: water-1 gallon, Pepsi-3 cans daily, milk 16 ozs. portion  Alcohol consumption: No  Usual Food consumption:   3 meals, 5-15 grams carbohydrate, large protein consumed at dinner     Barriers:   -none          Follow Up Plan: 3 months Will remain available. Contact information given.     Referring Provider: Kateryna Ferguson MD  Time spent with patient: 30 minutes

## 2021-11-19 ENCOUNTER — OFFICE VISIT (OUTPATIENT)
Dept: ENDOCRINOLOGY | Age: 61
End: 2021-11-19
Payer: COMMERCIAL

## 2021-11-19 PROCEDURE — 97803 MED NUTRITION INDIV SUBSEQ: CPT | Performed by: DIETITIAN, REGISTERED

## 2021-11-22 ENCOUNTER — OFFICE VISIT (OUTPATIENT)
Dept: FAMILY MEDICINE CLINIC | Age: 61
End: 2021-11-22
Payer: COMMERCIAL

## 2021-11-22 VITALS
HEART RATE: 97 BPM | DIASTOLIC BLOOD PRESSURE: 58 MMHG | OXYGEN SATURATION: 99 % | BODY MASS INDEX: 34.16 KG/M2 | HEIGHT: 60 IN | WEIGHT: 174 LBS | SYSTOLIC BLOOD PRESSURE: 130 MMHG

## 2021-11-22 DIAGNOSIS — D50.9 IRON DEFICIENCY ANEMIA, UNSPECIFIED IRON DEFICIENCY ANEMIA TYPE: Primary | ICD-10-CM

## 2021-11-22 DIAGNOSIS — E11.00 UNCONTROLLED TYPE 2 DIABETES MELLITUS WITH HYPEROSMOLARITY WITHOUT COMA, WITHOUT LONG-TERM CURRENT USE OF INSULIN (HCC): ICD-10-CM

## 2021-11-22 LAB — HBA1C MFR BLD: 7.8 %

## 2021-11-22 PROCEDURE — G8484 FLU IMMUNIZE NO ADMIN: HCPCS | Performed by: FAMILY MEDICINE

## 2021-11-22 PROCEDURE — 83036 HEMOGLOBIN GLYCOSYLATED A1C: CPT | Performed by: FAMILY MEDICINE

## 2021-11-22 PROCEDURE — 99214 OFFICE O/P EST MOD 30 MIN: CPT | Performed by: FAMILY MEDICINE

## 2021-11-22 PROCEDURE — G8417 CALC BMI ABV UP PARAM F/U: HCPCS | Performed by: FAMILY MEDICINE

## 2021-11-22 PROCEDURE — 2022F DILAT RTA XM EVC RTNOPTHY: CPT | Performed by: FAMILY MEDICINE

## 2021-11-22 PROCEDURE — 3017F COLORECTAL CA SCREEN DOC REV: CPT | Performed by: FAMILY MEDICINE

## 2021-11-22 PROCEDURE — 4004F PT TOBACCO SCREEN RCVD TLK: CPT | Performed by: FAMILY MEDICINE

## 2021-11-22 PROCEDURE — G8427 DOCREV CUR MEDS BY ELIG CLIN: HCPCS | Performed by: FAMILY MEDICINE

## 2021-11-22 PROCEDURE — 3051F HG A1C>EQUAL 7.0%<8.0%: CPT | Performed by: FAMILY MEDICINE

## 2021-11-22 RX ORDER — ASCORBIC ACID 500 MG
500 TABLET ORAL 2 TIMES DAILY
Qty: 30 TABLET | Refills: 3 | Status: SHIPPED | OUTPATIENT
Start: 2021-11-22 | End: 2021-12-21

## 2021-11-22 RX ORDER — DOXYCYCLINE HYCLATE 50 MG/1
324 CAPSULE, GELATIN COATED ORAL 2 TIMES DAILY
Qty: 30 TABLET | Refills: 3 | Status: SHIPPED | OUTPATIENT
Start: 2021-11-22 | End: 2021-11-23 | Stop reason: SDUPTHER

## 2021-11-23 ENCOUNTER — HOSPITAL ENCOUNTER (OUTPATIENT)
Dept: PHYSICAL THERAPY | Age: 61
Setting detail: THERAPIES SERIES
Discharge: HOME | End: 2021-11-23
Payer: COMMERCIAL

## 2021-11-23 DIAGNOSIS — E11.00 UNCONTROLLED TYPE 2 DIABETES MELLITUS WITH HYPEROSMOLARITY WITHOUT COMA, WITHOUT LONG-TERM CURRENT USE OF INSULIN (HCC): ICD-10-CM

## 2021-11-23 DIAGNOSIS — D50.9 IRON DEFICIENCY ANEMIA, UNSPECIFIED IRON DEFICIENCY ANEMIA TYPE: ICD-10-CM

## 2021-11-23 PROCEDURE — 97112 NEUROMUSCULAR REEDUCATION: CPT | Performed by: SPECIALIST

## 2021-11-23 PROCEDURE — 97110 THERAPEUTIC EXERCISES: CPT | Performed by: SPECIALIST

## 2021-11-23 PROCEDURE — G0283 ELEC STIM OTHER THAN WOUND: HCPCS | Performed by: SPECIALIST

## 2021-11-23 RX ORDER — DOXYCYCLINE HYCLATE 50 MG/1
324 CAPSULE, GELATIN COATED ORAL 2 TIMES DAILY
Qty: 60 TABLET | Refills: 3 | Status: SHIPPED | OUTPATIENT
Start: 2021-11-23 | End: 2022-05-12

## 2021-11-23 RX ORDER — PEN NEEDLE, DIABETIC 32GX 5/32"
NEEDLE, DISPOSABLE MISCELLANEOUS
Qty: 100 EACH | Refills: 3 | Status: SHIPPED | OUTPATIENT
Start: 2021-11-23

## 2021-11-23 NOTE — TELEPHONE ENCOUNTER
----- Message from [facility] sent at 11/23/2021  4:36 PM EST -----  Subject: Message to Provider    QUESTIONS  Information for Provider? Patient got Vitamin c pills instead of Iron   pills. Patient was told that she would get Iron pills at her last visit.   ---------------------------------------------------------------------------  --------------  [address]  What is the best way for the office to contact you? OK to leave message on   voicemail  Preferred Call Back Phone Number? [phone]  ---------------------------------------------------------------------------  --------------  SCRIPT ANSWERS  Relationship to Patient?  Self

## 2021-11-23 NOTE — TELEPHONE ENCOUNTER
Medication:   Requested Prescriptions     Pending Prescriptions Disp Refills    TECHLITE PEN NEEDLES 32G X 4 MM MISC [Pharmacy Med Name: [facility] [place] PEN NEEDLES/32G X 4MM [id number] MISC] 100 each 3     Sig: USE AS DIRECTED         Last appt: 08/03/2021  Next appt: 11/24/2021    Last Labs DM:   Lab Results   Component Value Date    LABA1C 7.8 11/22/2021

## 2021-11-23 NOTE — FLOWSHEET NOTE
[facility], [address] [place] [address] [address]  Phone: [phone]   Fax:     [phone]      Physical Therapy Treatment Note/ Progress Report:       Date:  2021    Patient Name:  Gilles Barrett    :  1960  MRN: [id number]  Restrictions/Precautions:    Medical/Treatment Diagnosis Information:    M51.36 Lumbar DDD   ·      Insurance/Certification information:    [address]  Physician Information:    Laury Romberg, MD  Has the plan of care been signed (Y/N):        []  Yes  [x]  No     Date of Patient follow up with Physician: NS    Is this a Progress Report:     []  Yes  [x]  No      If Yes:  Date Range for reporting period:  Beginnin21 ------------ Endin21    Progress report will be due (10 Rx or 30 days whichever is less): 55     Recertification will be due (POC Duration  / 90 days whichever is less): 22      Visit # Insurance Allowable Auth Required   In Person 2 32 units []  Yes     []  No    Tele Health 0  []  Yes     []  No    Total 2       Functional Scale: Oswestry 22%   Date assessed:  21      Latex Allergy:  [x]NO      []YES  Preferred Language for Healthcare:   [x]English       []other:    Pain level:  4/10     SUBJECTIVE:  Reports throbbing in R LB with PU    OBJECTIVE: See eval   Observation:    Test measurements:      RESTRICTIONS/PRECAUTIONS: none    Exercises/Interventions:   Therapeutic Ex (57828) Sets/sec Reps Notes/CUES HEP   YVONNE/PU  5 min  x   Prone glut sets  15x  x   Prone buttock kicks  15x  x                        TUSHAR B abd/ flex/ ext 15# 10x     LP 60# 20x                                 Manual Intervention (75778)                                                 NMR re-education (11359)   CUES NEEDED    PB  rows RD 15x  x                               Pt educ Dx posture etc  8 min                          Therapeutic Activity (88138) ESU/CP  10 min                   [facility] [email or web address]           Therapeutic Exercise and NMR EXR  [x] (19145) Provided verbal/tactile cueing for activities related to strengthening, flexibility, endurance, ROM  for improvements in proximal hip and core control with self care, mobility, lifting and ambulation.  [] (31238) Provided verbal/tactile cueing for activities related to improving balance, coordination, kinesthetic sense, posture, motor skill, proprioception  to assist with core control in self care, mobility, lifting, and ambulation. Therapeutic Activities:    [x] (62956 or 12645) Provided verbal/tactile cueing for activities related to improving balance, coordination, kinesthetic sense, posture, motor skill, proprioception and motor activation to allow for proper function  with self care and ADLs  [] (05881) Provided training and instruction to the patient for proper core and proximal hip recruitment and positioning with ambulation re-education     Home Exercise Program:    [x] (16467) Reviewed/Progressed HEP activities related to strengthening, flexibility, endurance, ROM of core, proximal hip and LE for functional self-care, mobility, lifting and ambulation   [] (60505) Reviewed/Progressed HEP activities related to improving balance, coordination, kinesthetic sense, posture, motor skill, proprioception of core, proximal hip and LE for self care, mobility, lifting, and ambulation      Manual Treatments:  PROM / STM / Oscillations-Mobs:  G-I, II, III, IV (PA's, Inf., Post.)  [x] (43030) Provided manual therapy to mobilize proximal hip and LS spine soft tissue/joints for the purpose of modulating pain, promoting relaxation,  increasing ROM, reducing/eliminating soft tissue swelling/inflammation/restriction, improving soft tissue extensibility and allowing for proper ROM for normal function with self care, mobility, lifting and ambulation.      Modalities:     [] GAME READY (VASO)- for significant edema, swelling, pain control. Charges:  Timed Code Treatment Minutes: 38   Total Treatment Minutes:  48   BWC:  TE TIME:  NMR TIME:  MANUAL TIME:  UNTIMED MINUTES:  Medicare Total:   3/32  2/32  0/32  1/32        [] EVAL (LOW) 46361 (typically 20 minutes face-to-face)  [] EVAL (MOD) 11925 (typically 30 minutes face-to-face)  [] EVAL (HIGH) 90997 (typically 45 minutes face-to-face)  [] RE-EVAL     [x] KINGA(88078) x   2  [] IONTO  [x] NMR (53212) x 1    [] VASO  [] Manual (27853) x     [] Other:  [] TA x      [] Mech Traction (75756)  [] ES(attended) (20136)      [x] ES (un) (95900):       ASSESSMENT:  Good tolerance with Angle, recommend increase extension program 3-4x/day she is currently performing 1x/day      GOALS:     Patient stated goal: sit walk    Therapist goals for Patient:   Short Term Goals: To be achieved in: 2 weeks  1. Independent in HEP and progression per patient tolerance, in order to prevent re-injury. [x] Progressing: [] Met: [] Not Met: [] Adjusted  2. Patient will have a decrease in pain to facilitate improvement in movement, function, and ADLs as indicated by Functional Deficits. [x] Progressing: [] Met: [] Not Met: [] Adjusted    Long Term Goals: To be achieved in: 6-8 weeks  1. Disability index score of 11% or less for the KIN to assist with reaching prior level of function. [x] Progressing: [] Met: [] Not Met: [] Adjusted  2. Patient will demonstrate increased AROM to WNL, good LS mobility, good hip ROM to allow for proper joint functioning as indicated by patients Functional Deficits. [x] Progressing: [] Met: [] Not Met: [] Adjusted  3. Patient will demonstrate an increase in Strength to good proximal hip and core activation to allow for proper functional mobility as indicated by patients Functional Deficits. [x] Progressing: [] Met: [] Not Met: [] Adjusted  4. Patient will return to [facility] for  functional activities without increased symptoms or restriction.    [x] Progressing: [] Met: [] Not Met: [] Adjusted  5. Sit/ walk with min to no limitations (patient specific functional goal)    [x] Progressing: [] Met: [] Not Met: [] Adjusted           Overall Progression Towards Functional goals/ Treatment Progress Update:  [x] Patient is progressing as expected towards functional goals listed. [] Progression is slowed due to complexities/Impairments listed. [] Progression has been slowed due to co-morbidities. [] Plan just implemented, too soon to assess goals progression <30days   [] Goals require adjustment due to lack of progress  [] Patient is not progressing as expected and requires additional follow up with physician  [] Other    Prognosis for POC: [x] Good [] Fair  [] Poor      Patient requires continued skilled intervention: [x] Yes  [] No    Treatment/Activity Tolerance:  [x] Patient able to complete treatment  [] Patient limited by fatigue  [] Patient limited by pain    [] Patient limited by other medical complications  [] Other:     Return to Play: (if applicable)   []  Stage 1: Intro to Strength   []  Stage 2: Return to Run and Strength   []  Stage 3: Return to Jump and Strength   []  Stage 4: Dynamic Strength and Agility   []  Stage 5: Sport Specific Training     []  Ready to Return to Play, Meets All Above Stages   [x]  Not Ready for Return to Sports   Comments:                           PLAN:   [x] Continue per plan of care [] Alter current plan (see comments above)  [] Plan of care initiated [] Hold pending MD visit [] Discharge    Electronically signed by:  Dl Caruso PT    Note: If patient does not return for scheduled/ recommended follow up visits, this note will serve as a discharge from care along with most recent update on progress.

## 2021-11-23 NOTE — TELEPHONE ENCOUNTER
Iron pill was not ready for  at [facility]. Directions state to take 2 pills daily, only #30 was sent in. Advise.

## 2021-11-24 ENCOUNTER — OFFICE VISIT (OUTPATIENT)
Dept: ENDOCRINOLOGY | Age: 61
End: 2021-11-24
Payer: COMMERCIAL

## 2021-11-24 VITALS
DIASTOLIC BLOOD PRESSURE: 69 MMHG | BODY MASS INDEX: 34.16 KG/M2 | HEIGHT: 60 IN | SYSTOLIC BLOOD PRESSURE: 139 MMHG | WEIGHT: 174 LBS | HEART RATE: 90 BPM

## 2021-11-24 DIAGNOSIS — Z79.4 CONTROLLED TYPE 2 DIABETES MELLITUS WITH COMPLICATION, WITH LONG-TERM CURRENT USE OF INSULIN (HCC): Primary | ICD-10-CM

## 2021-11-24 DIAGNOSIS — E03.9 ACQUIRED HYPOTHYROIDISM: ICD-10-CM

## 2021-11-24 DIAGNOSIS — E11.8 CONTROLLED TYPE 2 DIABETES MELLITUS WITH COMPLICATION, WITH LONG-TERM CURRENT USE OF INSULIN (HCC): Primary | ICD-10-CM

## 2021-11-24 PROCEDURE — 3051F HG A1C>EQUAL 7.0%<8.0%: CPT | Performed by: INTERNAL MEDICINE

## 2021-11-24 PROCEDURE — 3017F COLORECTAL CA SCREEN DOC REV: CPT | Performed by: INTERNAL MEDICINE

## 2021-11-24 PROCEDURE — 99214 OFFICE O/P EST MOD 30 MIN: CPT | Performed by: INTERNAL MEDICINE

## 2021-11-24 PROCEDURE — G8484 FLU IMMUNIZE NO ADMIN: HCPCS | Performed by: INTERNAL MEDICINE

## 2021-11-24 PROCEDURE — G8417 CALC BMI ABV UP PARAM F/U: HCPCS | Performed by: INTERNAL MEDICINE

## 2021-11-24 PROCEDURE — 2022F DILAT RTA XM EVC RTNOPTHY: CPT | Performed by: INTERNAL MEDICINE

## 2021-11-24 PROCEDURE — 4004F PT TOBACCO SCREEN RCVD TLK: CPT | Performed by: INTERNAL MEDICINE

## 2021-11-24 PROCEDURE — G8427 DOCREV CUR MEDS BY ELIG CLIN: HCPCS | Performed by: INTERNAL MEDICINE

## 2021-11-24 NOTE — PROGRESS NOTES
Seen as patient for diabetes    Has seen Margret Summers    Interim:    Stopped trulicity due to diarrhea    Diagnosed with Type 2 diabetes mellitus in    Known diabetic complications: none   Uncontrolled, moderate    Current diabetic medications     Lantus 30 units  Metformin 50mmg BID    States has not been on any other medication    Last A1c  7.8%<---- 10.8%<----- 9.5%<--- 11.9% <---- 7.1%    Prior visit with dietician: no  Current diet: on average, 3 meals per day  Drinks regular soda  Current exercise: walking   Current monitoring regimen: home blood tests -1  times daily     Has brought blood glucose log/meter: yes  Home blood sugar records: 114-205  Any episodes of hypoglycemia? ----  Worsened by high CHO    No Hx of CAD , PVD, CVA    Hyperlipidemia:   For   Years  Takes lipitor 40mg zetia  Controlled      Last eye exam:   Last foot exam:   Last microalbumin to creatinine ratio: 213 on   Lisinopril 5mg    She is on levothyroxine, stable    She is Moni's aunt    Past Medical History:   Diagnosis Date    Anxiety     [facility] disorder ([facility] [address]     Brain aneurysm     [place]    Closed angle glaucoma     COPD (chronic obstructive pulmonary disease) ([facility] [address]     Hyperlipidemia     Hypertension     Hypothyroidism     Migraines     Open-angle glaucoma of both eyes     RLS (restless legs syndrome)     Rotator cuff disorder     right shoulder    TIA (transient ischemic attack) 11    Tobacco use disorder     Type II or unspecified type diabetes mellitus without mention of complication, not stated as uncontrolled     diet controlled     Past Surgical History:   Procedure Laterality Date    BLADDER SUSPENSION      CARPAL TUNNEL RELEASE Bilateral     right and left     SECTION      x 2    CHOLECYSTECTOMY      COLONOSCOPY      COLONOSCOPY  2016    diverticulosis    CYST REMOVAL  2006    hidradenitis?     EYE SURGERY Right 16    cataract    EYE SURGERY Left 16 cataract removal    FOOT SURGERY  2018    GLAUCOMA SURGERY Bilateral     twice in right eye    HYSTERECTOMY  2002/2015    total    KNEE SURGERY Right 11/30/2018    OTHER SURGICAL HISTORY  09/2015    left oopherectomy    OTHER SURGICAL HISTORY  04/10/2019    CYSTOSCOPY, TRANSVAGINAL TAPING, CYSTOCELE REPAIR     NM LAP,SLING OPERATION N/A 4/10/2019    CYSTOSCOPY, TRANSVAGINAL TAPING, CYSTOCELE REPAIR performed by Mary Green MD at [address] Right 2008 and Brinda 1 2013    x2    THROAT SURGERY  06/2018    mass revmoved vocal cord     THROAT SURGERY      UPPER GASTROINTESTINAL ENDOSCOPY  09/12/2016    small bowel bx    URETHRA SURGERY  06/28/2018     \  Current Outpatient Medications   Medication Sig Dispense Refill    Insulin Pen Needle (Play With Pictures / HangPic PEN NEEDLES) 32G X 4 MM MISC USE WITH INSULIN ONCE DAILY 100 each 3    ferrous gluconate (FERGON) 324 (38 Fe) MG tablet Take 1 tablet by mouth 2 times daily 60 tablet 3    ascorbic acid (VITAMIN C) 500 MG tablet Take 1 tablet by mouth 2 times daily 30 tablet 3    pravastatin (PRAVACHOL) 40 MG tablet TAKE ONE TABLET BY MOUTH EACH EVENING 30 tablet 5    insulin glargine (LANTUS SOLOSTAR) 100 UNIT/ML injection pen INJECT 26-36 UNITS UNDER THE SKIN ONCE DAILY 15 mL 2    lisinopril (PRINIVIL;ZESTRIL) 5 MG tablet TAKE ONE (1) TABLET BY MOUTH DAILY  90 tablet 1    ibuprofen (ADVIL;MOTRIN) 800 MG tablet TAKE ONE (1) TABLET BY MOUTH TWO (2) TIMES DAILY AS NEEDED FOR PAIN  60 tablet 5    omeprazole (PRILOSEC) 40 MG delayed release capsule TAKE ONE CAPSULE BY MOUTH TWICE A DAY  60 capsule 2    metFORMIN (GLUCOPHAGE-XR) 500 MG extended release tablet TAKE ONE (1) TABLET TWICE DAILY BY MOUTH  60 tablet 5    fluticasone (FLOVENT HFA) 220 MCG/ACT inhaler Inhale 1 puff into the lungs 2 times daily (Patient not taking: Reported on 11/22/2021) 1 each 5    ferrous gluconate (FERGON) 240 (27 Fe) MG tablet Take 1 tablet by mouth 3 times daily (with meals) (Patient not taking: Reported on 11/22/2021) 90 tablet 0    ezetimibe (ZETIA) 10 MG tablet TAKE ONE (1) TABLET BY MOUTH DAILY  30 tablet 5    levothyroxine (SYNTHROID) 100 MCG tablet TAKE ONE TABLET BY MOUTH ONCE DAILY  30 tablet 3    atorvastatin (LIPITOR) 40 MG tablet TAKE ONE (1) TABLET BY MOUTH DAILY  90 tablet 1    rOPINIRole (REQUIP) 2 MG tablet TAKE ONE TABLET BY MOUTH EVERY NIGHT AT BEDTIME AND IN THE EVENING AS NEEDED  60 tablet 5    ADVAIR -21 MCG/ACT inhaler INHALE ONE (1) PUFF INTO THE LUNGS TWO (2) TIMES DAILY  12 g 5    glucose monitoring kit (FREESTYLE) monitoring kit 1 kit by Does not apply route daily 1 kit 0    FreeStyle Lancets MISC 1 each by Does not apply route daily 100 each 3    blood glucose test strips (ASCENSIA AUTODISC VI;ONE TOUCH ULTRA TEST VI) strip 1 each by In Vitro route daily As needed.  100 each 3    methocarbamol (ROBAXIN) 750 MG tablet       fluorometholone (FML) 0.1 % ophthalmic suspension       fluticasone (FLONASE) 50 MCG/ACT nasal spray 2 sprays by Each Nostril route daily 1 Bottle 5    azelastine (ASTELIN) 0.1 % nasal spray 2 sprays by Nasal route 2 times daily Use in each nostril as directed 2 Bottle 5    loratadine (CLARITIN) 10 MG tablet TAKE ONE TABLET BY MOUTH ONCE DAILY  30 tablet 11    ipratropium-albuterol (DUONEB) 0.5-2.5 (3) MG/3ML SOLN nebulizer solution INHALE THREE (3) MLS INTO THE LUNGS EVERY FOUR (4) HOURS AS NEEDED FOR SHORTNESS OF BREATH  360 mL 5    FREESTYLE LITE strip USE DAILY AS NEEDED  50 each 5    B Complex Vitamins (VITAMIN B COMPLEX PO) Take by mouth      Ascorbic Acid (KARTIK-C PO) Take by mouth      estradiol (ESTRACE) 1 MG tablet       albuterol (PROVENTIL) (2.5 MG/3ML) 0.083% nebulizer solution Take 3 mLs by nebulization every 4 hours as needed for Wheezing 375 mL 5    albuterol sulfate  (90 Base) MCG/ACT inhaler INHALE 2 PUFFS INTO THE LUNGS EVERY 4 HOURS AS NEEDED FOR WHEEZING 1 Inhaler 5    HYDROcodone-acetaminophen (NORCO) 5-325 MG per tablet       venlafaxine (EFFEXOR XR) 150 MG extended release capsule       venlafaxine (EFFEXOR-XR) 75 MG XR capsule   Take 150 mg by mouth See Admin Instructions 150mg in morning, 75mg at night      lamotrigine (LAMICTAL) 100 MG tablet Take 100 mg by mouth 2 times daily.  timolol (BETIMOL) 0.5 % ophthalmic solution 1 drop 2 times daily. No current facility-administered medications for this visit. Review of Systems  Please see scanned document dated and signed      Objective:      BP (!) 156/80   Pulse 90   Ht 5' (1.524 m)   Wt 174 lb (78.9 kg)   LMP  (LMP Unknown)   BMI 33.98 kg/m²   Wt Readings from Last 3 Encounters:   11/24/21 174 lb (78.9 kg)   11/22/21 174 lb (78.9 kg)   08/10/21 174 lb 9.6 oz (79.2 kg)     Constitutional: Well-developed, appears stated age, cooperative, in no acute distress  H/E/N/M/T:atraumatic, normocephalic, external ears, nose, lips normal without lesions  Eyes: Lids, lashes, conjunctivae and sclerae normal, No proptosis, no redness  Neck: supple, symmetrical, no swelling  Skin: No obvious rashes or lesions present.   Skin and hair texture normal  Psychiatric: Judgement and Insight:  judgement and insight appear normal  Neuro: Normal without focal findings, speech is normal normal, speech is spontaneous  Chest: No labored breathing, no chest deformity, no stridor  Musculoskeletal: No joint deformity, swelling    Lab Reviewed   No components found for: CHLPL  Lab Results   Component Value Date    TRIG 201 (H) 01/26/2021    TRIG 869 (H) 09/21/2020    TRIG 352 (H) 05/12/2017     Lab Results   Component Value Date    HDL 75 (H) 08/23/2021    HDL 58 01/26/2021    HDL 49 09/21/2020     Lab Results   Component Value Date    LDLCALC 88 08/23/2021    LDLCALC 41 01/26/2021    LDLCALC see below 09/21/2020     Lab Results   Component Value Date    LABVLDL 37 08/23/2021    LABVLDL 40 01/26/2021    LABVLDL see below 09/21/2020 Lab Results   Component Value Date    LABA1C 7.8 11/22/2021       Assessment:     Francesco Hanson is a 63 y.o. female with :    1.T2DM: Uncontrolled, A1c high. Discussed goals, she cut down regular soda, referred to dietician. Advised may need rapid acting insulin, started GLP-1 agonist but did not tolerate. A1c is better, adjust dose as fasting occasionally high. May try SGLT-2 inhibitor  2. Hypothyroidism: TSH at goal  3. HLD: LDL at goal  4. Obesity       Plan:      Lantus 34 units, advised self titration   continue metformin   Advise to check blood sugar 2 times a day   Patient to send blood sugar log for titration. Advise to low simple carbohydrate and protein with each  meal diet. Diabetes Care: recommend yearly eye exam, foot exam and urine microalbumin to   creatinine ratio. Patient is up-to-date.

## 2021-11-30 ENCOUNTER — HOSPITAL ENCOUNTER (OUTPATIENT)
Dept: PHYSICAL THERAPY | Age: 61
Setting detail: THERAPIES SERIES
Discharge: HOME | End: 2021-11-30
Payer: COMMERCIAL

## 2021-11-30 PROCEDURE — 97140 MANUAL THERAPY 1/> REGIONS: CPT | Performed by: SPECIALIST

## 2021-11-30 PROCEDURE — 97110 THERAPEUTIC EXERCISES: CPT | Performed by: SPECIALIST

## 2021-11-30 PROCEDURE — 97112 NEUROMUSCULAR REEDUCATION: CPT | Performed by: SPECIALIST

## 2021-11-30 RX ORDER — LOSARTAN POTASSIUM 50 MG/1
TABLET ORAL
Qty: 90 TABLET | Refills: 3 | Status: SHIPPED | OUTPATIENT
Start: 2021-11-30 | End: 2022-05-12 | Stop reason: SDUPTHER

## 2021-11-30 NOTE — TELEPHONE ENCOUNTER
Last Office Visit  -  11.22.21 [facility]  Next Office Visit  -  1.4.22 [facility]

## 2021-11-30 NOTE — FLOWSHEET NOTE
[address] [facility] and Sports Rehabilitation[address], [address] [place] [address] [address]  Phone: [phone]   Fax:     [phone]      Physical Therapy Treatment Note/ Progress Report:       Date:  2021    Patient Name:  Marco A Flores    :  1960  MRN: [id number]  Restrictions/Precautions:    Medical/Treatment Diagnosis Information:    M51.36 Lumbar DDD   ·      Insurance/Certification information:    [address]  Physician Information:    Darell Gomez MD  Has the plan of care been signed (Y/N):        []  Yes  [x]  No     Date of Patient follow up with Physician: NS    Is this a Progress Report:     []  Yes  [x]  No      If Yes:  Date Range for reporting period:  Beginnin21 ------------ Endin21    Progress report will be due (10 Rx or 30 days whichever is less):      Recertification will be due (POC Duration  / 90 days whichever is less): 22      Visit # Insurance Allowable Auth Required   In Person 3 32 units []  Yes     []  No    Tele Health 0  []  Yes     []  No    Total 3       Functional Scale: Oswestry 22%   Date assessed:  21      Latex Allergy:  [x]NO      []YES  Preferred Language for Healthcare:   [x]English       []other:    Pain level:  4/10     SUBJECTIVE:  Reports throbbing in R LB with PU    OBJECTIVE: See eval   Observation:    Test measurements:      RESTRICTIONS/PRECAUTIONS: none    Exercises/Interventions:   Therapeutic Ex (83198) Sets/sec Reps Notes/CUES HEP   YVONNE/PU  5 min  x   Prone glut sets  15x  x   Prone buttock kicks  15x  x                        TUSHAR B abd/ flex/ ext 15# 10x     LP 60# 20x                                 Manual Intervention (27535)       Prone MFR R lumbar  8 min                                        NMR re-education (34327)   CUES NEEDED    PB  rows RD 15x  x   Slant board  1 min     HR on slant board  25x                   Pt educ Dx posture etc  8 min Therapeutic Activity (76771)                                       PutPlace access code: [email or web address]           Therapeutic Exercise and NMR EXR  [x] (72785) Provided verbal/tactile cueing for activities related to strengthening, flexibility, endurance, ROM  for improvements in proximal hip and core control with self care, mobility, lifting and ambulation.  [] (04230) Provided verbal/tactile cueing for activities related to improving balance, coordination, kinesthetic sense, posture, motor skill, proprioception  to assist with core control in self care, mobility, lifting, and ambulation. Therapeutic Activities:    [x] (74098 or 76347) Provided verbal/tactile cueing for activities related to improving balance, coordination, kinesthetic sense, posture, motor skill, proprioception and motor activation to allow for proper function  with self care and ADLs  [] (80322) Provided training and instruction to the patient for proper core and proximal hip recruitment and positioning with ambulation re-education     Home Exercise Program:    [x] (52601) Reviewed/Progressed HEP activities related to strengthening, flexibility, endurance, ROM of core, proximal hip and LE for functional self-care, mobility, lifting and ambulation   [] (75227) Reviewed/Progressed HEP activities related to improving balance, coordination, kinesthetic sense, posture, motor skill, proprioception of core, proximal hip and LE for self care, mobility, lifting, and ambulation      Manual Treatments:  PROM / STM / Oscillations-Mobs:  G-I, II, III, IV (PA's, Inf., Post.)  [x] (05803) Provided manual therapy to mobilize proximal hip and LS spine soft tissue/joints for the purpose of modulating pain, promoting relaxation,  increasing ROM, reducing/eliminating soft tissue swelling/inflammation/restriction, improving soft tissue extensibility and allowing for proper ROM for normal function with self care, mobility, lifting and ambulation. Modalities:     [] GAME READY (VASO)- for significant edema, swelling, pain control. Charges:  Timed Code Treatment Minutes: 38   Total Treatment Minutes:  48   BWC:  TE TIME:  NMR TIME:  MANUAL TIME:  UNTIMED MINUTES:  Medicare Total:   3/32  2/32  0/32  1/32        [] EVAL (LOW) 19331 (typically 20 minutes face-to-face)  [] EVAL (MOD) 14019 (typically 30 minutes face-to-face)  [] EVAL (HIGH) 15312 (typically 45 minutes face-to-face)  [] RE-EVAL     [x] NZ(04285) x   1  [] IONTO  [x] NMR (05354) x 1    [] VASO  [x] Manual (74161) x 1    [x] Other:CP  [] TA x      [] Mech Traction (95669)  [] ES(attended) (07624)      [] ES (un) (64046):       ASSESSMENT:  Good tolerance with Angle, tenderness with MFR.    GOALS:     Patient stated goal: sit walk    Therapist goals for Patient:   Short Term Goals: To be achieved in: 2 weeks  1. Independent in HEP and progression per patient tolerance, in order to prevent re-injury. [x] Progressing: [] Met: [] Not Met: [] Adjusted  2. Patient will have a decrease in pain to facilitate improvement in movement, function, and ADLs as indicated by Functional Deficits. [x] Progressing: [] Met: [] Not Met: [] Adjusted    Long Term Goals: To be achieved in: 6-8 weeks  1. Disability index score of 11% or less for the KIN to assist with reaching prior level of function. [x] Progressing: [] Met: [] Not Met: [] Adjusted  2. Patient will demonstrate increased AROM to WNL, good LS mobility, good hip ROM to allow for proper joint functioning as indicated by patients Functional Deficits. [x] Progressing: [] Met: [] Not Met: [] Adjusted  3. Patient will demonstrate an increase in Strength to good proximal hip and core activation to allow for proper functional mobility as indicated by patients Functional Deficits. [x] Progressing: [] Met: [] Not Met: [] Adjusted  4. Patient will return to [facility] for  functional activities without increased symptoms or restriction.    [x] Progressing: [] Met: [] Not Met: [] Adjusted  5. Sit/ walk with min to no limitations (patient specific functional goal)    [x] Progressing: [] Met: [] Not Met: [] Adjusted           Overall Progression Towards Functional goals/ Treatment Progress Update:  [x] Patient is progressing as expected towards functional goals listed. [] Progression is slowed due to complexities/Impairments listed. [] Progression has been slowed due to co-morbidities. [] Plan just implemented, too soon to assess goals progression <30days   [] Goals require adjustment due to lack of progress  [] Patient is not progressing as expected and requires additional follow up with physician  [] Other    Prognosis for POC: [x] Good [] Fair  [] Poor      Patient requires continued skilled intervention: [x] Yes  [] No    Treatment/Activity Tolerance:  [x] Patient able to complete treatment  [] Patient limited by fatigue  [] Patient limited by pain    [] Patient limited by other medical complications  [] Other:     Return to Play: (if applicable)   []  Stage 1: Intro to Strength   []  Stage 2: Return to Run and Strength   []  Stage 3: Return to Jump and Strength   []  Stage 4: Dynamic Strength and Agility   []  Stage 5: Sport Specific Training     []  Ready to Return to Play, Meets All Above Stages   [x]  Not Ready for Return to Sports   Comments:                           PLAN:   [x] Continue per plan of care [] Alter current plan (see comments above)  [] Plan of care initiated [] Hold pending MD visit [] Discharge    Electronically signed by:  Lakeisha Pulliam PT    Note: If patient does not return for scheduled/ recommended follow up visits, this note will serve as a discharge from care along with most recent update on progress.

## 2021-12-02 ENCOUNTER — HOSPITAL ENCOUNTER (OUTPATIENT)
Dept: PHYSICAL THERAPY | Age: 61
Setting detail: THERAPIES SERIES
Discharge: HOME | End: 2021-12-02
Payer: COMMERCIAL

## 2021-12-02 PROCEDURE — 97140 MANUAL THERAPY 1/> REGIONS: CPT | Performed by: SPECIALIST

## 2021-12-02 PROCEDURE — 97112 NEUROMUSCULAR REEDUCATION: CPT | Performed by: SPECIALIST

## 2021-12-02 PROCEDURE — 97110 THERAPEUTIC EXERCISES: CPT | Performed by: SPECIALIST

## 2021-12-02 NOTE — FLOWSHEET NOTE
[address] [place] [address] and Sports Rehabilitation[address], [address] [place] [address] [address]  Phone: [phone]   Fax:     [phone]      Physical Therapy Treatment Note/ Progress Report:       Date:  2021    Patient Name:  Flynn Brambila    :  1960  MRN: [id number]  Restrictions/Precautions:    Medical/Treatment Diagnosis Information:    M51.36 Lumbar DDD   ·      Insurance/Certification information:    [address]  Physician Information:    Nirali Storm MD  Has the plan of care been signed (Y/N):        []  Yes  [x]  No     Date of Patient follow up with Physician: NS    Is this a Progress Report:     []  Yes  [x]  No      If Yes:  Date Range for reporting period:  Beginnin21 ------------ Endin21    Progress report will be due (10 Rx or 30 days whichever is less):      Recertification will be due (POC Duration  / 90 days whichever is less): 22      Visit # Insurance Allowable Auth Required   In Person 4 32 units []  Yes     []  No    Tele Health 0  []  Yes     []  No    Total 4       Functional Scale: Oswestry 22%   Date assessed:  21      Latex Allergy:  [x]NO      []YES  Preferred Language for Healthcare:   [x]English       []other:    Pain level:  5/10     SUBJECTIVE:  Reports increased pain with walking and moving     OBJECTIVE: See eval   Observation:    Test measurements:      RESTRICTIONS/PRECAUTIONS: none    Exercises/Interventions:   Therapeutic Ex (46920) Sets/sec Reps Notes/CUES HEP   YVONNE/PU  5 min  x   Prone glut sets  15x  x   Prone buttock kicks  15x  x                        TUSHAR B abd/ flex/ ext 15# 10x     LP 60# 20x                                 Manual Intervention (28643)       Prone MFR R lumbar  8 min                                        NMR re-education (80165)   CUES NEEDED    PB  rows RD 15x  x   Slant board  1 min     HR on slant board  25x                   Pt educ Dx posture etc  8 min                          Therapeutic Activity (49713)                         CP  10 min            Medbridge access code: [email or web address]           Therapeutic Exercise and NMR EXR  [x] (47182) Provided verbal/tactile cueing for activities related to strengthening, flexibility, endurance, ROM  for improvements in proximal hip and core control with self care, mobility, lifting and ambulation.  [] (07609) Provided verbal/tactile cueing for activities related to improving balance, coordination, kinesthetic sense, posture, motor skill, proprioception  to assist with core control in self care, mobility, lifting, and ambulation.      Therapeutic Activities:    [x] (76733 or 12415) Provided verbal/tactile cueing for activities related to improving balance, coordination, kinesthetic sense, posture, motor skill, proprioception and motor activation to allow for proper function  with self care and ADLs  [] (87994) Provided training and instruction to the patient for proper core and proximal hip recruitment and positioning with ambulation re-education     Home Exercise Program:    [x] (33338) Reviewed/Progressed HEP activities related to strengthening, flexibility, endurance, ROM of core, proximal hip and LE for functional self-care, mobility, lifting and ambulation   [] (27466) Reviewed/Progressed HEP activities related to improving balance, coordination, kinesthetic sense, posture, motor skill, proprioception of core, proximal hip and LE for self care, mobility, lifting, and ambulation      Manual Treatments:  PROM / STM / Oscillations-Mobs:  G-I, II, III, IV (PA's, Inf., Post.)  [x] (36363) Provided manual therapy to mobilize proximal hip and LS spine soft tissue/joints for the purpose of modulating pain, promoting relaxation,  increasing ROM, reducing/eliminating soft tissue swelling/inflammation/restriction, improving soft tissue extensibility and allowing for proper ROM for normal function with self care, mobility, lifting and ambulation. Modalities:     [] GAME READY (VASO)- for significant edema, swelling, pain control. Charges:  Timed Code Treatment Minutes: 38   Total Treatment Minutes:  48   BWC:  TE TIME:  NMR TIME:  MANUAL TIME:  UNTIMED MINUTES:  Medicare Total:   4/32  3/32  2/32  1/32        [] EVAL (LOW) 39324 (typically 20 minutes face-to-face)  [] EVAL (MOD) 95149 (typically 30 minutes face-to-face)  [] EVAL (HIGH) 17592 (typically 45 minutes face-to-face)  [] RE-EVAL     [x] SF(69463) x   1  [] IONTO  [x] NMR (71631) x 1    [] VASO  [x] Manual (80119) x 1    [x] Other:CP  [] TA x      [] Mech Traction (01983)  [] ES(attended) (82814)      [] ES (un) (44550):       ASSESSMENT:  Good tolerance with Angle, tenderness with MFR.    GOALS:     Patient stated goal: sit walk    Therapist goals for Patient:   Short Term Goals: To be achieved in: 2 weeks  1. Independent in HEP and progression per patient tolerance, in order to prevent re-injury. [x] Progressing: [] Met: [] Not Met: [] Adjusted  2. Patient will have a decrease in pain to facilitate improvement in movement, function, and ADLs as indicated by Functional Deficits. [x] Progressing: [] Met: [] Not Met: [] Adjusted    Long Term Goals: To be achieved in: 6-8 weeks  1. Disability index score of 11% or less for the KIN to assist with reaching prior level of function. [x] Progressing: [] Met: [] Not Met: [] Adjusted  2. Patient will demonstrate increased AROM to WNL, good LS mobility, good hip ROM to allow for proper joint functioning as indicated by patients Functional Deficits. [x] Progressing: [] Met: [] Not Met: [] Adjusted  3. Patient will demonstrate an increase in Strength to good proximal hip and core activation to allow for proper functional mobility as indicated by patients Functional Deficits. [x] Progressing: [] Met: [] Not Met: [] Adjusted  4. Patient [facility] for  functional activities without increased symptoms or restriction. [x] Progressing: [] Met: [] Not Met: [] Adjusted  5. Sit/ walk with min to no limitations (patient specific functional goal)    [x] Progressing: [] Met: [] Not Met: [] Adjusted           Overall Progression Towards Functional goals/ Treatment Progress Update:  [x] Patient is progressing as expected towards functional goals listed. [] Progression is slowed due to complexities/Impairments listed. [] Progression has been slowed due to co-morbidities. [] Plan just implemented, too soon to assess goals progression <30days   [] Goals require adjustment due to lack of progress  [] Patient is not progressing as expected and requires additional follow up with physician  [] Other    Prognosis for POC: [x] Good [] Fair  [] Poor      Patient requires continued skilled intervention: [x] Yes  [] No    Treatment/Activity Tolerance:  [x] Patient able to complete treatment  [] Patient limited by fatigue  [] Patient limited by pain    [] Patient limited by other medical complications  [] Other:     Return to Play: (if applicable)   []  Stage 1: Intro to Strength   []  Stage 2: Return to Run and Strength   []  Stage 3: Return to Jump and Strength   []  Stage 4: Dynamic Strength and Agility   []  Stage 5: Sport Specific Training     []  Ready to Return to Play, Meets All Above Stages   [x]  Not Ready for Return to Sports   Comments:                           PLAN:   [x] Continue per plan of care [] Alter current plan (see comments above)  [] Plan of care initiated [] Hold pending MD visit [] Discharge    Electronically signed by:  Ronit Cortes PT    Note: If patient does not return for scheduled/ recommended follow up visits, this note will serve as a discharge from care along with most recent update on progress.

## 2021-12-05 NOTE — PROGRESS NOTES
Rita Momin (:  1960) is a 63 y.o. female,Established patient, here for evaluation of the following chief complaint(s):  Discuss Labs         ASSESSMENT/PLAN:  1. Iron deficiency anemia, unspecified iron deficiency anemia type  -     ascorbic acid (VITAMIN C) 500 MG tablet; Take 1 tablet by mouth 2 times daily, Disp-30 tablet, R-3Normal  2. Uncontrolled type 2 diabetes mellitus with hyperosmolarity without coma, without long-term current use of insulin ([facility])  -     POCT glycosylated hemoglobin (Hb A1C)    Lab Results   Component Value Date    LABA1C 7.8 2021     Lab Results   Component Value Date    .1 2021     Although appears better controlled with her being anemic unsure of these results are completely valid. No follow-ups on file. Subjective   SUBJECTIVE/OBJECTIVE:  Rita Momin is a 63 y.o. female. Patient presents with:  Discuss Labs      53-year-old female found to be iron deficient anemic recently. Was placed on medication and this has been helping. She has not had any side effects. She has had some iron absorption issues in the past.  She denies any other significant concerns related to that. She is diabetic and this has been fairly uncontrolled in the past.  She has had no bleeding diatheses. The patients PMH, surgical history, family history, medications, allergies were all reviewed and updated as appropriate today. Review of Systems       Objective   Physical Exam  Vitals and nursing note reviewed. Constitutional:       Appearance: Normal appearance. She is well-developed. HENT:      Head: Normocephalic and atraumatic. Right Ear: External ear normal.      Left Ear: External ear normal.      Nose: Nose normal.   Eyes:      Conjunctiva/sclera: Conjunctivae normal.      Pupils: Pupils are equal, round, and reactive to light. Cardiovascular:      Rate and Rhythm: Normal rate and regular rhythm. Heart sounds: Normal heart sounds. Pulmonary:      Effort: Pulmonary effort is normal.      Breath sounds: Normal breath sounds. Abdominal:      General: Bowel sounds are normal.      Palpations: Abdomen is soft. Musculoskeletal:         General: Normal range of motion. Cervical back: Normal range of motion and neck supple. Skin:     General: Skin is dry. Neurological:      Mental Status: She is alert and oriented to person, place, and time. Deep Tendon Reflexes: Reflexes are normal and symmetric. Psychiatric:         Behavior: Behavior normal.         Thought Content: Thought content normal.         Judgment: Judgment normal.              An electronic signature was used to authenticate this note.     --Carla De La Cruz MD

## 2021-12-07 ENCOUNTER — HOSPITAL ENCOUNTER (OUTPATIENT)
Dept: PHYSICAL THERAPY | Age: 61
Setting detail: THERAPIES SERIES
Discharge: HOME | End: 2021-12-07
Payer: COMMERCIAL

## 2021-12-07 PROCEDURE — 97140 MANUAL THERAPY 1/> REGIONS: CPT | Performed by: SPECIALIST

## 2021-12-07 PROCEDURE — 97112 NEUROMUSCULAR REEDUCATION: CPT | Performed by: SPECIALIST

## 2021-12-07 PROCEDURE — 97110 THERAPEUTIC EXERCISES: CPT | Performed by: SPECIALIST

## 2021-12-07 NOTE — FLOWSHEET NOTE
[address] [facility]  Phone: [phone]   Fax:     [phone]      Physical Therapy Treatment Note/ Progress Report:       Date:  2021    Patient Name:  Chay Vargas    :  1960  MRN: [id number]  Restrictions/Precautions:    Medical/Treatment Diagnosis Information:    M51.36 Lumbar DDD   ·      Insurance/Certification information:    [facility] MEDICAID  Physician Information:    Elisa Mcknight MD  Has the plan of care been signed (Y/N):        []  Yes  [x]  No     Date of Patient follow up with Physician: NS    Is this a Progress Report:     []  Yes  [x]  No      If Yes:  Date Range for reporting period:  Beginnin21 ------------ Endin21    Progress report will be due (10 Rx or 30 days whichever is less):      Recertification will be due (POC Duration  / 90 days whichever is less): 22      Visit # Insurance Allowable Auth Required   In Person 5 32 units []  Yes     []  No    Tele Health 0  []  Yes     []  No    Total 5       Functional Scale: Oswestry 22%   Date assessed:  21      Latex Allergy:  [x]NO      []YES  Preferred Language for Healthcare:   [x]English       []other:    Pain level:  5/10     SUBJECTIVE:  Reports increased pain with walking and moving     OBJECTIVE: See eval   Observation:    Test measurements:      RESTRICTIONS/PRECAUTIONS: none    Exercises/Interventions:   Therapeutic Ex (23446) Sets/sec Reps Notes/CUES HEP   YVONNE/PU  5 min  x   Prone glut sets  15x  x   Prone buttock kicks  15x  x                        TUSHAR B abd/ flex/ ext 15# 10x2     LP 60# 20x     bike  5 min     Knee flexion/ ext machine 20# 15x                   Manual Intervention (64473)       Prone MFR R lumbar  8 min                                        NMR re-education (85256)   CUES NEEDED    PB  rows RD 15x  x   Slant board  1 min     HR on slant board 25x                   Pt educ Dx posture etc  8 min                          Therapeutic Activity (42983)                             HP  10 min     Medbridge access code: [email or web address]           Therapeutic Exercise and NMR EXR  [x] (99170) Provided verbal/tactile cueing for activities related to strengthening, flexibility, endurance, ROM  for improvements in proximal hip and core control with self care, mobility, lifting and ambulation.  [] (67511) Provided verbal/tactile cueing for activities related to improving balance, coordination, kinesthetic sense, posture, motor skill, proprioception  to assist with core control in self care, mobility, lifting, and ambulation.      Therapeutic Activities:    [x] (66639 or 91778) Provided verbal/tactile cueing for activities related to improving balance, coordination, kinesthetic sense, posture, motor skill, proprioception and motor activation to allow for proper function  with self care and ADLs  [] (41526) Provided training and instruction to the patient for proper core and proximal hip recruitment and positioning with ambulation re-education     Home Exercise Program:    [x] (77306) Reviewed/Progressed HEP activities related to strengthening, flexibility, endurance, ROM of core, proximal hip and LE for functional self-care, mobility, lifting and ambulation   [] (98691) Reviewed/Progressed HEP activities related to improving balance, coordination, kinesthetic sense, posture, motor skill, proprioception of core, proximal hip and LE for self care, mobility, lifting, and ambulation      Manual Treatments:  PROM / STM / Oscillations-Mobs:  G-I, II, III, IV (PA's, Inf., Post.)  [x] (18250) Provided manual therapy to mobilize proximal hip and LS spine soft tissue/joints for the purpose of modulating pain, promoting relaxation,  increasing ROM, reducing/eliminating soft tissue swelling/inflammation/restriction, improving soft tissue extensibility and allowing for proper ROM for normal function with self care, mobility, lifting and ambulation. Modalities:     [] GAME READY (VASO)- for significant edema, swelling, pain control. Charges:  Timed Code Treatment Minutes: 38   Total Treatment Minutes:  48   BWC:  TE TIME:  NMR TIME:  MANUAL TIME:  UNTIMED MINUTES:  Medicare Total:   5/32  4/32  3/32  1/32        [] EVAL (LOW) 89165 (typically 20 minutes face-to-face)  [] EVAL (MOD) 71684 (typically 30 minutes face-to-face)  [] EVAL (HIGH) 11506 (typically 45 minutes face-to-face)  [] RE-EVAL     [x] TU(72745) x   1  [] IONTO  [x] NMR (49646) x 1    [] VASO  [x] Manual (69829) x 1    [x] Other:CP  [] TA x      [] Mech Traction (02970)  [] ES(attended) (85114)      [] ES (un) (93562):       ASSESSMENT:  Good tolerance with Angle, tenderness with MFR.    GOALS:     Patient stated goal: sit walk    Therapist goals for Patient:   Short Term Goals: To be achieved in: 2 weeks  1. Independent in HEP and progression per patient tolerance, in order to prevent re-injury. [x] Progressing: [] Met: [] Not Met: [] Adjusted  2. Patient will have a decrease in pain to facilitate improvement in movement, function, and ADLs as indicated by Functional Deficits. [x] Progressing: [] Met: [] Not Met: [] Adjusted    Long Term Goals: To be achieved in: 6-8 weeks  1. Disability index score of 11% or less for the KIN to assist with reaching prior level of function. [x] Progressing: [] Met: [] Not Met: [] Adjusted  2. Patient will demonstrate increased AROM to WNL, good LS mobility, good hip ROM to allow for proper joint functioning as indicated by patients Functional Deficits. [x] Progressing: [] Met: [] Not Met: [] Adjusted  3. Patient will demonstrate an increase in Strength to good proximal hip and core activation to allow for proper functional mobility as indicated by patients Functional Deficits. [x] Progressing: [] Met: [] Not Met: [] Adjusted  4.  Patient will return to [facility] for  functional activities without increased symptoms or restriction. [x] Progressing: [] Met: [] Not Met: [] Adjusted  5. Sit/ walk with min to no limitations (patient specific functional goal)    [x] Progressing: [] Met: [] Not Met: [] Adjusted           Overall Progression Towards Functional goals/ Treatment Progress Update:  [x] Patient is progressing as expected towards functional goals listed. [] Progression is slowed due to complexities/Impairments listed. [] Progression has been slowed due to co-morbidities. [] Plan just implemented, too soon to assess goals progression <30days   [] Goals require adjustment due to lack of progress  [] Patient is not progressing as expected and requires additional follow up with physician  [] Other    Prognosis for POC: [x] Good [] Fair  [] Poor      Patient requires continued skilled intervention: [x] Yes  [] No    Treatment/Activity Tolerance:  [x] Patient able to complete treatment  [] Patient limited by fatigue  [] Patient limited by pain    [] Patient limited by other medical complications  [] Other:     Return to Play: (if applicable)   []  Stage 1: Intro to Strength   []  Stage 2: Return to Run and Strength   []  Stage 3: Return to Jump and Strength   []  Stage 4: Dynamic Strength and Agility   []  Stage 5: Sport Specific Training     []  Ready to Return to Play, Meets All Above Stages   [x]  Not Ready for Return to Sports   Comments:                           PLAN:   [x] Continue per plan of care [] Alter current plan (see comments above)  [] Plan of care initiated [] Hold pending MD visit [] Discharge    Electronically signed by:  Sandra Serna PT    Note: If patient does not return for scheduled/ recommended follow up visits, this note will serve as a discharge from care along with most recent update on progress.

## 2021-12-09 ENCOUNTER — HOSPITAL ENCOUNTER (OUTPATIENT)
Dept: PHYSICAL THERAPY | Age: 61
Setting detail: THERAPIES SERIES
Discharge: HOME | End: 2021-12-09
Payer: COMMERCIAL

## 2021-12-09 PROCEDURE — 97140 MANUAL THERAPY 1/> REGIONS: CPT | Performed by: SPECIALIST

## 2021-12-09 PROCEDURE — 97112 NEUROMUSCULAR REEDUCATION: CPT | Performed by: SPECIALIST

## 2021-12-09 PROCEDURE — 97110 THERAPEUTIC EXERCISES: CPT | Performed by: SPECIALIST

## 2021-12-09 NOTE — FLOWSHEET NOTE
3 [facility]  Phone: [phone]   Fax:     [phone]      Physical Therapy Treatment Note/ Progress Report:       Date:  2021    Patient Name:  Ranulfo Barrera    :  1960  MRN: [id number]  Restrictions/Precautions:    Medical/Treatment Diagnosis Information:    M51.36 Lumbar DDD   ·      Insurance/Certification information:    [facility] MEDICAID  Physician Information:    Mimi Hawkins MD  Has the plan of care been signed (Y/N):        []  Yes  [x]  No     Date of Patient follow up with Physician: NS    Is this a Progress Report:     []  Yes  [x]  No      If Yes:  Date Range for reporting period:  Beginnin21 ------------ Endin21    Progress report will be due (10 Rx or 30 days whichever is less):      Recertification will be due (POC Duration  / 90 days whichever is less): 22      Visit # Insurance Allowable Auth Required   In Person 6 32 units []  Yes     []  No    Tele Health 0  []  Yes     []  No    Total 6       Functional Scale: Oswestry 22%   Date assessed:  21      Latex Allergy:  [x]NO      []YES  Preferred Language for Healthcare:   [x]English       []other:    Pain level:  5/10     SUBJECTIVE:  Reports increased pain with walking and moving     OBJECTIVE: See eval   Observation:    Test measurements:      RESTRICTIONS/PRECAUTIONS: none    Exercises/Interventions:   Therapeutic Ex (48593) Sets/sec Reps Notes/CUES HEP   YVONNE/PU  5 min  x   Prone glut sets  15x  x   Prone buttock kicks  15x  x                        TUSHAR B abd/ flex/ ext 15# 10x2     LP 60# 20x     bike  5 min     Knee flexion/ ext machine 20# 20x                   Manual Intervention (03631)       Prone MFR R lumbar  8 min                                        NMR re-education (23611)   CUES NEEDED    PB  rows RD 15x  x   Slant board  1 min     HR on slant board proper ROM for normal function with self care, mobility, lifting and ambulation. Modalities:     [] GAME READY (VASO)- for significant edema, swelling, pain control. Charges:  Timed Code Treatment Minutes: 38   Total Treatment Minutes:  48   BWC:  TE TIME:  NMR TIME:  MANUAL TIME:  UNTIMED MINUTES:  Medicare Total:   6/32 5/32 4/32 1/32        [] EVAL (LOW) 18570 (typically 20 minutes face-to-face)  [] EVAL (MOD) 12884 (typically 30 minutes face-to-face)  [] EVAL (HIGH) 16737 (typically 45 minutes face-to-face)  [] RE-EVAL     [x] WA(44514) x   1  [] IONTO  [x] NMR (94188) x 1    [] VASO  [x] Manual (88779) x 1    [x] Other:HP  [] TA x      [] Mech Traction (17698)  [] ES(attended) (73797)      [] ES (un) (35052):       ASSESSMENT:  Good tolerance with Angle, tenderness with MFR. YVONNE relieves pain    GOALS:     Patient stated goal: sit walk    Therapist goals for Patient:   Short Term Goals: To be achieved in: 2 weeks  1. Independent in HEP and progression per patient tolerance, in order to prevent re-injury. [x] Progressing: [] Met: [] Not Met: [] Adjusted  2. Patient will have a decrease in pain to facilitate improvement in movement, function, and ADLs as indicated by Functional Deficits. [x] Progressing: [] Met: [] Not Met: [] Adjusted    Long Term Goals: To be achieved in: 6-8 weeks  1. Disability index score of 11% or less for the KIN to assist with reaching prior level of function. [x] Progressing: [] Met: [] Not Met: [] Adjusted  2. Patient will demonstrate increased AROM to WNL, good LS mobility, good hip ROM to allow for proper joint functioning as indicated by patients Functional Deficits. [x] Progressing: [] Met: [] Not Met: [] Adjusted  3. Patient will demonstrate an increase in Strength to good proximal hip and core activation to allow for proper functional mobility as indicated by patients Functional Deficits. [x] Progressing: [] Met: [] Not Met: [] Adjusted  4.  Patient will return to [facility] for  functional activities without increased symptoms or restriction. [x] Progressing: [] Met: [] Not Met: [] Adjusted  5. Sit/ walk with min to no limitations (patient specific functional goal)    [x] Progressing: [] Met: [] Not Met: [] Adjusted           Overall Progression Towards Functional goals/ Treatment Progress Update:  [x] Patient is progressing as expected towards functional goals listed. [] Progression is slowed due to complexities/Impairments listed. [] Progression has been slowed due to co-morbidities. [] Plan just implemented, too soon to assess goals progression <30days   [] Goals require adjustment due to lack of progress  [] Patient is not progressing as expected and requires additional follow up with physician  [] Other    Prognosis for POC: [x] Good [] Fair  [] Poor      Patient requires continued skilled intervention: [x] Yes  [] No    Treatment/Activity Tolerance:  [x] Patient able to complete treatment  [] Patient limited by fatigue  [] Patient limited by pain    [] Patient limited by other medical complications  [] Other:     Return to Play: (if applicable)   []  Stage 1: Intro to Strength   []  Stage 2: Return to Run and Strength   []  Stage 3: Return to Jump and Strength   []  Stage 4: Dynamic Strength and Agility   []  Stage 5: Sport Specific Training     []  Ready to Return to Play, Meets All Above Stages   [x]  Not Ready for Return to Sports   Comments:                           PLAN:   [x] Continue per plan of care [] Alter current plan (see comments above)  [] Plan of care initiated [] Hold pending MD visit [] Discharge    Electronically signed by:  Nela Miller PT    Note: If patient does not return for scheduled/ recommended follow up visits, this note will serve as a discharge from care along with most recent update on progress.

## 2021-12-14 ENCOUNTER — APPOINTMENT (OUTPATIENT)
Dept: PHYSICAL THERAPY | Age: 61
End: 2021-12-14
Payer: COMMERCIAL

## 2021-12-16 ENCOUNTER — HOSPITAL ENCOUNTER (OUTPATIENT)
Dept: PHYSICAL THERAPY | Age: 61
Setting detail: THERAPIES SERIES
Discharge: HOME | End: 2021-12-16
Payer: COMMERCIAL

## 2021-12-16 PROCEDURE — 97140 MANUAL THERAPY 1/> REGIONS: CPT | Performed by: SPECIALIST

## 2021-12-16 PROCEDURE — 97110 THERAPEUTIC EXERCISES: CPT | Performed by: SPECIALIST

## 2021-12-16 PROCEDURE — 97112 NEUROMUSCULAR REEDUCATION: CPT | Performed by: SPECIALIST

## 2021-12-16 NOTE — FLOWSHEET NOTE
[address] [facility]  Phone: [phone]   Fax:     [phone]      Physical Therapy Treatment Note/ Progress Report:       Date:  2021    Patient Name:  Ranulfo Barrera    :  1960  MRN: [id number]  Restrictions/Precautions:    Medical/Treatment Diagnosis Information:    M51.36 Lumbar DDD   ·      Insurance/Certification information:    [facility] MEDICAID  Physician Information:    Mimi Hawkins MD  Has the plan of care been signed (Y/N):        []  Yes  [x]  No     Date of Patient follow up with Physician: NS    Is this a Progress Report:     []  Yes  [x]  No      If Yes:  Date Range for reporting period:  Beginnin21 ------------ Endin21    Progress report will be due (10 Rx or 30 days whichever is less): 05/30/15     Recertification will be due (POC Duration  / 90 days whichever is less): 22      Visit # Insurance Allowable Auth Required   In Person 7 32 units []  Yes     []  No    Tele Health 0  []  Yes     []  No    Total 7       Functional Scale: Oswestry 22%   Date assessed:  21      Latex Allergy:  [x]NO      []YES  Preferred Language for Healthcare:   [x]English       []other:    Pain level:  5/10     SUBJECTIVE:  Reports increased pain with walking and moving   Less muscular pain with self MA with ball     OBJECTIVE: See eval   Observation:    Test measurements:      RESTRICTIONS/PRECAUTIONS: none    Exercises/Interventions:   Therapeutic Ex (45857) Sets/sec Reps Notes/CUES HEP   YVONNE/PU  5 min  x   Prone glut sets  15x  x   Prone buttock kicks  15x  x                        TUSHAR B abd/ flex/ ext 30# 10x2     LP 60# 20x     bike  5 min     Knee flexion/ ext machine 20# 20x                   Manual Intervention (46299)       Prone MFR R lumbar  8 min                                        NMR re-education (89729)   CUES NEEDED    PB  rows RD 15x x   Slant board  1 min     HR on slant board  25x     lunges  10x     Wall squats  10x     Pt educ Dx posture etc  8 min                          Therapeutic Activity (12148)                         CP  10 min         [facility] access code: [email or web address]           Therapeutic Exercise and NMR EXR  [x] (57120) Provided verbal/tactile cueing for activities related to strengthening, flexibility, endurance, ROM  for improvements in proximal hip and core control with self care, mobility, lifting and ambulation.  [] (54564) Provided verbal/tactile cueing for activities related to improving balance, coordination, kinesthetic sense, posture, motor skill, proprioception  to assist with core control in self care, mobility, lifting, and ambulation.      Therapeutic Activities:    [x] (63759 or 01751) Provided verbal/tactile cueing for activities related to improving balance, coordination, kinesthetic sense, posture, motor skill, proprioception and motor activation to allow for proper function  with self care and ADLs  [] (83727) Provided training and instruction to the patient for proper core and proximal hip recruitment and positioning with ambulation re-education     Home Exercise Program:    [x] (66185) Reviewed/Progressed HEP activities related to strengthening, flexibility, endurance, ROM of core, proximal hip and LE for functional self-care, mobility, lifting and ambulation   [] (33330) Reviewed/Progressed HEP activities related to improving balance, coordination, kinesthetic sense, posture, motor skill, proprioception of core, proximal hip and LE for self care, mobility, lifting, and ambulation      Manual Treatments:  PROM / STM / Oscillations-Mobs:  G-I, II, III, IV (PA's, Inf., Post.)  [x] (22827) Provided manual therapy to mobilize proximal hip and LS spine soft tissue/joints for the purpose of modulating pain, promoting relaxation,  increasing ROM, reducing/eliminating soft tissue swelling/inflammation/restriction, improving soft tissue extensibility and allowing for proper ROM for normal function with self care, mobility, lifting and ambulation. Modalities:     [] GAME READY (VASO)- for significant edema, swelling, pain control. Charges:  Timed Code Treatment Minutes: 38   Total Treatment Minutes:  48   BWC:  TE TIME:  NMR TIME:  MANUAL TIME:  UNTIMED MINUTES:  Medicare Total:   7/32 6/32 5/32 1/32        [] EVAL (LOW) 43117 (typically 20 minutes face-to-face)  [] EVAL (MOD) 20649 (typically 30 minutes face-to-face)  [] EVAL (HIGH) 80666 (typically 45 minutes face-to-face)  [] RE-EVAL     [x] FY(49237) x   1  [] IONTO  [x] NMR (01959) x 1    [] VASO  [x] Manual (22659) x 1    [x] Other:HP  [] TA x      [] Mech Traction (56004)  [] ES(attended) (22338)      [] ES (un) (36394):       ASSESSMENT:  Good tolerance with Angle, tenderness with MFR. YVONNE relieves pain    GOALS:     Patient stated goal: sit walk    Therapist goals for Patient:   Short Term Goals: To be achieved in: 2 weeks  1. Independent in HEP and progression per patient tolerance, in order to prevent re-injury. [x] Progressing: [] Met: [] Not Met: [] Adjusted  2. Patient will have a decrease in pain to facilitate improvement in movement, function, and ADLs as indicated by Functional Deficits. [x] Progressing: [] Met: [] Not Met: [] Adjusted    Long Term Goals: To be achieved in: 6-8 weeks  1. Disability index score of 11% or less for the KIN to assist with reaching prior level of function. [x] Progressing: [] Met: [] Not Met: [] Adjusted  2. Patient will demonstrate increased AROM to WNL, good LS mobility, good hip ROM to allow for proper joint functioning as indicated by patients Functional Deficits. [x] Progressing: [] Met: [] Not Met: [] Adjusted  3. Patient will demonstrate an increase in Strength to good proximal hip and core activation to allow for proper functional mobility as indicated by patients Functional Deficits.    [x] Progressing: [] Met: [] Not Met: [] Adjusted  4. Patient will return to [facility] for  functional activities without increased symptoms or restriction. [x] Progressing: [] Met: [] Not Met: [] Adjusted  5. Sit/ walk with min to no limitations (patient specific functional goal)    [x] Progressing: [] Met: [] Not Met: [] Adjusted           Overall Progression Towards Functional goals/ Treatment Progress Update:  [x] Patient is progressing as expected towards functional goals listed. [] Progression is slowed due to complexities/Impairments listed. [] Progression has been slowed due to co-morbidities. [] Plan just implemented, too soon to assess goals progression <30days   [] Goals require adjustment due to lack of progress  [] Patient is not progressing as expected and requires additional follow up with physician  [] Other    Prognosis for POC: [x] Good [] Fair  [] Poor      Patient requires continued skilled intervention: [x] Yes  [] No    Treatment/Activity Tolerance:  [x] Patient able to complete treatment  [] Patient limited by fatigue  [] Patient limited by pain    [] Patient limited by other medical complications  [] Other:     Return to Play: (if applicable)   []  Stage 1: Intro to Strength   []  Stage 2: Return to Run and Strength   []  Stage 3: Return to Jump and Strength   []  Stage 4: Dynamic Strength and Agility   []  Stage 5: Sport Specific Training     []  Ready to Return to Play, Meets All Above Stages   [x]  Not Ready for Return to Sports   Comments:                           PLAN:   [x] Continue per plan of care [] Alter current plan (see comments above)  [] Plan of care initiated [] Hold pending MD visit [] Discharge    Electronically signed by:  Yi Morse PT    Note: If patient does not return for scheduled/ recommended follow up visits, this note will serve as a discharge from care along with most recent update on progress.

## 2021-12-21 ENCOUNTER — HOSPITAL ENCOUNTER (OUTPATIENT)
Dept: PHYSICAL THERAPY | Age: 61
Setting detail: THERAPIES SERIES
End: 2021-12-21
Payer: COMMERCIAL

## 2021-12-21 DIAGNOSIS — D50.9 IRON DEFICIENCY ANEMIA, UNSPECIFIED IRON DEFICIENCY ANEMIA TYPE: ICD-10-CM

## 2021-12-21 RX ORDER — ASCORBIC ACID 500 MG
TABLET ORAL
Qty: 30 TABLET | Refills: 3 | Status: SHIPPED | OUTPATIENT
Start: 2021-12-21 | End: 2022-02-15

## 2021-12-23 ENCOUNTER — HOSPITAL ENCOUNTER (OUTPATIENT)
Dept: PHYSICAL THERAPY | Age: 61
Setting detail: THERAPIES SERIES
Discharge: HOME | End: 2021-12-23
Payer: COMMERCIAL

## 2021-12-23 PROCEDURE — 97112 NEUROMUSCULAR REEDUCATION: CPT | Performed by: SPECIALIST

## 2021-12-23 PROCEDURE — 97110 THERAPEUTIC EXERCISES: CPT | Performed by: SPECIALIST

## 2021-12-23 PROCEDURE — 97140 MANUAL THERAPY 1/> REGIONS: CPT | Performed by: SPECIALIST

## 2021-12-23 NOTE — FLOWSHEET NOTE
[address] [facility], [address] [place] [address] [address]  Phone: [phone]   Fax:     [phone]      Physical Therapy Treatment Note/ Progress Report:       Date:  2021    Patient Name:  Flynn Brambila    :  1960  MRN: [id number]  Restrictions/Precautions:    Medical/Treatment Diagnosis Information:    M51.36 Lumbar DDD   ·      Insurance/Certification information:    [facility] MEDICAID  Physician Information:    Nirali Storm MD  Has the plan of care been signed (Y/N):        []  Yes  [x]  No     Date of Patient follow up with Physician: NS    Is this a Progress Report:     []  Yes  [x]  No      If Yes:  Date Range for reporting period:  Beginnin21 ------------ Endin21    Progress report will be due (10 Rx or 30 days whichever is less): 68/21/15     Recertification will be due (POC Duration  / 90 days whichever is less): 22      Visit # Insurance Allowable Auth Required   In Person 8 32 units []  Yes     []  No    Tele Health 0  []  Yes     []  No    Total 8       Functional Scale: Oswestry 22%   Date assessed:  21      Latex Allergy:  [x]NO      []YES  Preferred Language for Healthcare:   [x]English       []other:    Pain level:  5/10     SUBJECTIVE:  Reports increased pain with walking and moving   Less muscular pain with self MA with ball     OBJECTIVE: See eval   Observation:    Test measurements:      RESTRICTIONS/PRECAUTIONS: none    Exercises/Interventions:   Therapeutic Ex (94015) Sets/sec Reps Notes/CUES HEP   YVONNE/PU  5 min  x   Prone glut sets  15x  x   Prone buttock kicks  15x  x                        TUSHAR B abd/ flex/ ext 30# 10x2     LP 70# 20x     bike  5 min     Knee flexion/ ext machine 20# 20x                   Manual Intervention (67436)       Prone MFR R lumbar  8 min                                        NMR re-education (21713)   CUES NEEDED    PB  rows RD 15x x   Slant board  1 min     HR on slant board  25x     lunges  10x     Wall squats  10x     Pt educ Dx posture etc  8 min                          Therapeutic Activity (22547)                                 [employer] access code: [email or web address]           Therapeutic Exercise and NMR EXR  [x] (23921) Provided verbal/tactile cueing for activities related to strengthening, flexibility, endurance, ROM  for improvements in proximal hip and core control with self care, mobility, lifting and ambulation.  [] (77960) Provided verbal/tactile cueing for activities related to improving balance, coordination, kinesthetic sense, posture, motor skill, proprioception  to assist with core control in self care, mobility, lifting, and ambulation.      Therapeutic Activities:    [x] (42250 or 18486) Provided verbal/tactile cueing for activities related to improving balance, coordination, kinesthetic sense, posture, motor skill, proprioception and motor activation to allow for proper function  with self care and ADLs  [] (19592) Provided training and instruction to the patient for proper core and proximal hip recruitment and positioning with ambulation re-education     Home Exercise Program:    [x] (84007) Reviewed/Progressed HEP activities related to strengthening, flexibility, endurance, ROM of core, proximal hip and LE for functional self-care, mobility, lifting and ambulation   [] (84482) Reviewed/Progressed HEP activities related to improving balance, coordination, kinesthetic sense, posture, motor skill, proprioception of core, proximal hip and LE for self care, mobility, lifting, and ambulation      Manual Treatments:  PROM / STM / Oscillations-Mobs:  G-I, II, III, IV (PA's, Inf., Post.)  [x] (60318) Provided manual therapy to mobilize proximal hip and LS spine soft tissue/joints for the purpose of modulating pain, promoting relaxation,  increasing ROM, reducing/eliminating soft tissue swelling/inflammation/restriction, improving soft tissue extensibility and allowing for proper ROM for normal function with self care, mobility, lifting and ambulation. Modalities:     [] GAME READY (VASO)- for significant edema, swelling, pain control. Charges:  Timed Code Treatment Minutes: 38   Total Treatment Minutes:  38   BWC:  TE TIME:  NMR TIME:  MANUAL TIME:  UNTIMED MINUTES:  Medicare Total:   8/32 7/32 6/32 1/32        [] EVAL (LOW) 02127 (typically 20 minutes face-to-face)  [] EVAL (MOD) 09993 (typically 30 minutes face-to-face)  [] EVAL (HIGH) 41215 (typically 45 minutes face-to-face)  [] RE-EVAL     [x] IJ(54230) x   1  [] IONTO  [x] NMR (52129) x 1    [] VASO  [x] Manual (97118) x 1    [] Other:HP  [] TA x      [] Mech Traction (99236)  [] ES(attended) (56506)      [] ES (un) (35132):       ASSESSMENT:  Good tolerance with Angle, less tenderness with MFR. YVONNE relieves pain    GOALS:     Patient stated goal: sit walk    Therapist goals for Patient:   Short Term Goals: To be achieved in: 2 weeks  1. Independent in HEP and progression per patient tolerance, in order to prevent re-injury. [x] Progressing: [] Met: [] Not Met: [] Adjusted  2. Patient will have a decrease in pain to facilitate improvement in movement, function, and ADLs as indicated by Functional Deficits. [x] Progressing: [] Met: [] Not Met: [] Adjusted    Long Term Goals: To be achieved in: 6-8 weeks  1. Disability index score of 11% or less for the KIN to assist with reaching prior level of function. [x] Progressing: [] Met: [] Not Met: [] Adjusted  2. Patient will demonstrate increased AROM to WNL, good LS mobility, good hip ROM to allow for proper joint functioning as indicated by patients Functional Deficits. [x] Progressing: [] Met: [] Not Met: [] Adjusted  3. Patient will demonstrate an increase in Strength to good proximal hip and core activation to allow for proper functional mobility as indicated by patients Functional Deficits.    [x] Progressing: [] Met: [] Not Met: [] Adjusted  4. Patient will return to [facility] for  functional activities without increased symptoms or restriction. [x] Progressing: [] Met: [] Not Met: [] Adjusted  5. Sit/ walk with min to no limitations (patient specific functional goal)    [x] Progressing: [] Met: [] Not Met: [] Adjusted           Overall Progression Towards Functional goals/ Treatment Progress Update:  [x] Patient is progressing as expected towards functional goals listed. [] Progression is slowed due to complexities/Impairments listed. [] Progression has been slowed due to co-morbidities. [] Plan just implemented, too soon to assess goals progression <30days   [] Goals require adjustment due to lack of progress  [] Patient is not progressing as expected and requires additional follow up with physician  [] Other    Prognosis for POC: [x] Good [] Fair  [] Poor      Patient requires continued skilled intervention: [x] Yes  [] No    Treatment/Activity Tolerance:  [x] Patient able to complete treatment  [] Patient limited by fatigue  [] Patient limited by pain    [] Patient limited by other medical complications  [] Other:     Return to Play: (if applicable)   []  Stage 1: Intro to Strength   []  Stage 2: Return to Run and Strength   []  Stage 3: Return to Jump and Strength   []  Stage 4: Dynamic Strength and Agility   []  Stage 5: Sport Specific Training     []  Ready to Return to Play, Meets All Above Stages   [x]  Not Ready for Return to Sports   Comments:                           PLAN: D/C [place]  [x] Continue per plan of care [] Alter current plan (see comments above)  [] Plan of care initiated [] Hold pending MD visit [] Discharge    Electronically signed by:  Lisa Gunderson PT    Note: If patient does not return for scheduled/ recommended follow up visits, this note will serve as a discharge from care along with most recent update on progress.

## 2021-12-29 ENCOUNTER — HOSPITAL ENCOUNTER (OUTPATIENT)
Dept: PHYSICAL THERAPY | Age: 61
Setting detail: THERAPIES SERIES
Discharge: HOME | End: 2021-12-29
Payer: COMMERCIAL

## 2021-12-29 PROCEDURE — 97140 MANUAL THERAPY 1/> REGIONS: CPT | Performed by: SPECIALIST

## 2021-12-29 PROCEDURE — 97110 THERAPEUTIC EXERCISES: CPT | Performed by: SPECIALIST

## 2021-12-29 PROCEDURE — 97112 NEUROMUSCULAR REEDUCATION: CPT | Performed by: SPECIALIST

## 2021-12-29 NOTE — DISCHARGE SUMMARY
[facility], [address], [address] [place] [address] [address]  Phone: [phone]   Fax:     [phone]       Physical Therapy Discharge Summary    Dear   Dwight Mon MD,    We had the pleasure of treating the following patient for physical therapy services at [address] [place] [address]. A summary of our findings can be found in the discharge summary below. If you have any questions or concerns regarding these findings, please do not hesitate to contact me at the office phone number checked above.   Thank you for the referral.     Physician Signature:________________________________Date:__________________  By signing above (or electronic signature), therapists plan is approved by physician      Overall Response to Treatment:   [x]Patient is responding well to treatment and improvement is noted with regards  to goals   []Patient should continue to improve in reasonable time if they continue HEP   []Patient has plateaued and is no longer responding to skilled PT intervention    []Patient is getting worse and would benefit from return to referring MD   []Patient unable to adhere to initial POC   [x]Other: Patient progressed well with PT, D/C to HEP    Date range of Visits: 21-21    Total Visits: 9      Date:  2021    Patient Name:  Josefina Vaca    :  1960  MRN: [id number]  Restrictions/Precautions:    Medical/Treatment Diagnosis Information:    M51.36 Lumbar DDD   ·      Insurance/Certification information:    [address]  Physician Information:    Dwight Mon MD  Has the plan of care been signed (Y/N):        []  Yes  [x]  No     Date of Patient follow up with Physician: NS    Is this a Progress Report:     []  Yes  [x]  No      If Yes:  Date Range for reporting period:  Beginning:     Progress report will be due (10 Rx or 30 days whichever is less):     Recertification will be due (POC Duration  / 90 days whichever is less): 2/17/22      Visit # Insurance Allowable Auth Required   In Person 9 32 units []  Yes     []  No    Tele Health 0  []  Yes     []  No    Total 9       Functional Scale: Oswestry 22%   Date assessed:  11/17/21    Oswestry 24% 12/29/21       Latex Allergy:  [x]NO      []YES  Preferred Language for Healthcare:   [x]English       []other:    Pain level:  4/10     SUBJECTIVE:  Reports less pain since start of PT  Less muscular pain with self MA with ball     OBJECTIVE:    Observation:    Test measurements:    12/29: strength LE 5-/5-5/5        RESTRICTIONS/PRECAUTIONS: none    Exercises/Interventions:   Therapeutic Ex (06733) Sets/sec Reps Notes/CUES HEP   YVONNE/PU  5 min  x   Prone glut sets  15x  x   Prone buttock kicks  15x  x                        TUSHAR B abd/ flex/ ext 30# 10x2     LP 70# 20x     bike  5 min     Knee flexion/ ext machine 25# 20x                   Manual Intervention (00072)       Prone MFR R lumbar  8 min                                        NMR re-education (09383)   CUES NEEDED    PB  rows RD 15x  x   Slant board  1 min     HR on slant board  25x     lunges  10x     Wall squats  10x     Pt educ Dx posture etc  8 min                          Therapeutic Activity (31639)                                 [employer] access code: [email or web address]           Therapeutic Exercise and NMR EXR  [x] (95254) Provided verbal/tactile cueing for activities related to strengthening, flexibility, endurance, ROM  for improvements in proximal hip and core control with self care, mobility, lifting and ambulation.  [] (34439) Provided verbal/tactile cueing for activities related to improving balance, coordination, kinesthetic sense, posture, motor skill, proprioception  to assist with core control in self care, mobility, lifting, and ambulation.      Therapeutic Activities:    [x] (55437 or 05170) Provided verbal/tactile cueing for activities related to improving balance, coordination, kinesthetic sense, posture, motor skill, proprioception and motor activation to allow for proper function  with self care and ADLs  [] (31586) Provided training and instruction to the patient for proper core and proximal hip recruitment and positioning with ambulation re-education     Home Exercise Program:    [x] (24091) Reviewed/Progressed HEP activities related to strengthening, flexibility, endurance, ROM of core, proximal hip and LE for functional self-care, mobility, lifting and ambulation   [] (03499) Reviewed/Progressed HEP activities related to improving balance, coordination, kinesthetic sense, posture, motor skill, proprioception of core, proximal hip and LE for self care, mobility, lifting, and ambulation      Manual Treatments:  PROM / STM / Oscillations-Mobs:  G-I, II, III, IV (PA's, Inf., Post.)  [x] (64079) Provided manual therapy to mobilize proximal hip and LS spine soft tissue/joints for the purpose of modulating pain, promoting relaxation,  increasing ROM, reducing/eliminating soft tissue swelling/inflammation/restriction, improving soft tissue extensibility and allowing for proper ROM for normal function with self care, mobility, lifting and ambulation. Modalities:     [] GAME READY (VASO)- for significant edema, swelling, pain control. Charges:  Timed Code Treatment Minutes: 38   Total Treatment Minutes:  38   BWC:  TE TIME:  NMR TIME:  MANUAL TIME:  UNTIMED MINUTES:  Medicare Total:   9/32 8/32 7/32 1/32        [] EVAL (LOW) 95048 (typically 20 minutes face-to-face)  [] EVAL (MOD) 65754 (typically 30 minutes face-to-face)  [] EVAL (HIGH) 06420 (typically 45 minutes face-to-face)  [] RE-EVAL     [x] QB(98485) x   1  [] IONTO  [x] NMR (86827) x 1    [] VASO  [x] Manual (67192) x 1    [] Other:HP  [] TA x      [] Mech Traction (63225)  [] ES(attended) (60113)      [] ES (un) (43043):       ASSESSMENT:  Good tolerance with Angle, less tenderness with MFR.  YVONNE relieves pain    GOALS:     Patient stated goal: sit walk    Therapist goals for Patient:   Short Term Goals: To be achieved in: 2 weeks  1. Independent in HEP and progression per patient tolerance, in order to prevent re-injury. [] Progressing: [x] Met: [] Not Met: [] Adjusted  2. Patient will have a decrease in pain to facilitate improvement in movement, function, and ADLs as indicated by Functional Deficits. [] Progressing: [x] Met: [] Not Met: [] Adjusted    Long Term Goals: To be achieved in: 6-8 weeks  1. Disability index score of 11% or less for the KIN to assist with reaching prior level of function. [x] Progressing: [] Met: [] Not Met: [] Adjusted  2. Patient will demonstrate increased AROM to WNL, good LS mobility, good hip ROM to allow for proper joint functioning as indicated by patients Functional Deficits. [x] Progressing: [] Met: [] Not Met: [] Adjusted  3. Patient will demonstrate an increase in Strength to good proximal hip and core activation to allow for proper functional mobility as indicated by patients Functional Deficits. [] Progressing: [x] Met: [] Not Met: [] Adjusted  4. Patient will return to [facility] for  functional activities without increased symptoms or restriction. [] Progressing: [x] Met: [] Not Met: [] Adjusted  5. Sit/ walk with min to no limitations (patient specific functional goal)    [x] Progressing: [] Met: [] Not Met: [] Adjusted           Overall Progression Towards Functional goals/ Treatment Progress Update:  [x] Patient is progressing as expected towards functional goals listed. [] Progression is slowed due to complexities/Impairments listed. [] Progression has been slowed due to co-morbidities.   [] Plan just implemented, too soon to assess goals progression <30days   [] Goals require adjustment due to lack of progress  [] Patient is not progressing as expected and requires additional follow up with physician  [] Other    Prognosis for POC: [x] Good [] Fair  [] Poor      Patient requires continued skilled intervention: [x] Yes  [] No    Treatment/Activity Tolerance:  [x] Patient able to complete treatment  [] Patient limited by fatigue  [] Patient limited by pain    [] Patient limited by other medical complications  [] Other:     Return to Play: (if applicable)   [x]  Stage 1: Intro to Strength   []  Stage 2: Return to Run and Strength   []  Stage 3: Return to Jump and Strength   []  Stage 4: Dynamic Strength and Agility   []  Stage 5: Sport Specific Training     []  Ready to Return to Play, Meets All Above Stages   []  Not Ready for Return to Sports   Comments:                           PLAN:   [] Continue per plan of care [] Alter current plan (see comments above)  [] Plan of care initiated [] Hold pending MD visit [x] Discharge    Electronically signed by:  Demetri Carballo PT    Note: If patient does not return for scheduled/ recommended follow up visits, this note will serve as a discharge from care along with most recent update on progress.

## 2022-01-03 RX ORDER — OMEPRAZOLE 40 MG/1
CAPSULE, DELAYED RELEASE ORAL
Qty: 60 CAPSULE | Refills: 2 | Status: SHIPPED | OUTPATIENT
Start: 2022-01-03 | End: 2022-03-14

## 2022-01-04 ENCOUNTER — OFFICE VISIT (OUTPATIENT)
Dept: FAMILY MEDICINE CLINIC | Age: 62
End: 2022-01-04
Payer: COMMERCIAL

## 2022-01-04 VITALS
WEIGHT: 177 LBS | SYSTOLIC BLOOD PRESSURE: 134 MMHG | HEART RATE: 85 BPM | OXYGEN SATURATION: 98 % | HEIGHT: 60 IN | TEMPERATURE: 97.5 F | DIASTOLIC BLOOD PRESSURE: 60 MMHG | BODY MASS INDEX: 34.75 KG/M2

## 2022-01-04 DIAGNOSIS — F33.1 MODERATE EPISODE OF RECURRENT MAJOR DEPRESSIVE DISORDER (HCC): ICD-10-CM

## 2022-01-04 DIAGNOSIS — E11.42 TYPE 2 DIABETES MELLITUS WITH DIABETIC POLYNEUROPATHY, WITH LONG-TERM CURRENT USE OF INSULIN (HCC): ICD-10-CM

## 2022-01-04 DIAGNOSIS — D50.9 IRON DEFICIENCY ANEMIA, UNSPECIFIED IRON DEFICIENCY ANEMIA TYPE: Primary | ICD-10-CM

## 2022-01-04 DIAGNOSIS — J43.2 CENTRILOBULAR EMPHYSEMA (HCC): ICD-10-CM

## 2022-01-04 DIAGNOSIS — Z79.4 TYPE 2 DIABETES MELLITUS WITH DIABETIC POLYNEUROPATHY, WITH LONG-TERM CURRENT USE OF INSULIN (HCC): ICD-10-CM

## 2022-01-04 LAB
A/G RATIO: 1.8 (ref 1.1–2.2)
ALBUMIN SERPL-MCNC: 4.4 G/DL (ref 3.4–5)
ALP BLD-CCNC: 78 U/L (ref 40–129)
ALT SERPL-CCNC: 44 U/L (ref 10–40)
ANION GAP SERPL CALCULATED.3IONS-SCNC: 18 MMOL/L (ref 3–16)
AST SERPL-CCNC: 46 U/L (ref 15–37)
BASOPHILS ABSOLUTE: 0.1 K/UL (ref 0–0.2)
BASOPHILS RELATIVE PERCENT: 1.4 %
BILIRUB SERPL-MCNC: <0.2 MG/DL (ref 0–1)
BUN BLDV-MCNC: 9 MG/DL (ref 7–20)
CALCIUM SERPL-MCNC: 9.3 MG/DL (ref 8.3–10.6)
CHLORIDE BLD-SCNC: 97 MMOL/L (ref 99–110)
CO2: 19 MMOL/L (ref 21–32)
CREAT SERPL-MCNC: 0.6 MG/DL (ref 0.6–1.1)
EOSINOPHILS ABSOLUTE: 0.5 K/UL (ref 0–0.6)
EOSINOPHILS RELATIVE PERCENT: 5.8 %
FERRITIN: 49.6 NG/ML (ref 15–150)
FOLATE: 7.42 NG/ML (ref 4.78–24.2)
GFR AFRICAN AMERICAN: >60
GFR NON-AFRICAN AMERICAN: >60
GLUCOSE BLD-MCNC: 234 MG/DL (ref 70–99)
HCT VFR BLD CALC: 36.2 % (ref 36–48)
HEMOGLOBIN: 11.6 G/DL (ref 12–16)
IRON SATURATION: 15 % (ref 15–50)
IRON: 56 UG/DL (ref 37–145)
LYMPHOCYTES ABSOLUTE: 3.1 K/UL (ref 1–5.1)
LYMPHOCYTES RELATIVE PERCENT: 32.3 %
MCH RBC QN AUTO: 25.9 PG (ref 26–34)
MCHC RBC AUTO-ENTMCNC: 32.1 G/DL (ref 31–36)
MCV RBC AUTO: 80.7 FL (ref 80–100)
MONOCYTES ABSOLUTE: 0.5 K/UL (ref 0–1.3)
MONOCYTES RELATIVE PERCENT: 5.1 %
NEUTROPHILS ABSOLUTE: 5.3 K/UL (ref 1.7–7.7)
NEUTROPHILS RELATIVE PERCENT: 55.4 %
PDW BLD-RTO: 25.8 % (ref 12.4–15.4)
PLATELET # BLD: 362 K/UL (ref 135–450)
PMV BLD AUTO: 7.4 FL (ref 5–10.5)
POTASSIUM SERPL-SCNC: 4.3 MMOL/L (ref 3.5–5.1)
RBC # BLD: 4.48 M/UL (ref 4–5.2)
SODIUM BLD-SCNC: 134 MMOL/L (ref 136–145)
TOTAL IRON BINDING CAPACITY: 368 UG/DL (ref 260–445)
TOTAL PROTEIN: 6.9 G/DL (ref 6.4–8.2)
VITAMIN B-12: 384 PG/ML (ref 211–911)
WBC # BLD: 9.5 K/UL (ref 4–11)

## 2022-01-04 PROCEDURE — 3046F HEMOGLOBIN A1C LEVEL >9.0%: CPT | Performed by: FAMILY MEDICINE

## 2022-01-04 PROCEDURE — 99214 OFFICE O/P EST MOD 30 MIN: CPT | Performed by: FAMILY MEDICINE

## 2022-01-04 PROCEDURE — 4004F PT TOBACCO SCREEN RCVD TLK: CPT | Performed by: FAMILY MEDICINE

## 2022-01-04 PROCEDURE — 3023F SPIROM DOC REV: CPT | Performed by: FAMILY MEDICINE

## 2022-01-04 PROCEDURE — 36415 COLL VENOUS BLD VENIPUNCTURE: CPT | Performed by: FAMILY MEDICINE

## 2022-01-04 PROCEDURE — G8484 FLU IMMUNIZE NO ADMIN: HCPCS | Performed by: FAMILY MEDICINE

## 2022-01-04 PROCEDURE — G8427 DOCREV CUR MEDS BY ELIG CLIN: HCPCS | Performed by: FAMILY MEDICINE

## 2022-01-04 PROCEDURE — G8417 CALC BMI ABV UP PARAM F/U: HCPCS | Performed by: FAMILY MEDICINE

## 2022-01-04 PROCEDURE — 2022F DILAT RTA XM EVC RTNOPTHY: CPT | Performed by: FAMILY MEDICINE

## 2022-01-04 PROCEDURE — 3017F COLORECTAL CA SCREEN DOC REV: CPT | Performed by: FAMILY MEDICINE

## 2022-01-04 NOTE — PROGRESS NOTES
Bayron Khan (:  1960) is a 63 y.o. female,Established patient, here for evaluation of the following chief complaint(s):  1 Month Follow-Up (6 week f/u)         ASSESSMENT/PLAN:  1. Iron deficiency anemia, unspecified iron deficiency anemia type  -     IRON AND TIBC  -     FERRITIN  -     CBC WITH AUTO DIFFERENTIAL  -     VITAMIN B12 & FOLATE  Recheck above labs. If her numbers do not improve significantly consider intravenous iron. 2. Centrilobular emphysema ([address]  Stable but patient encouraged to continue to quit smoking. 3. Moderate episode of recurrent major depressive disorder ([address]  Depression likely secondary to chronic medical illnesses. Will not improve given that her medical conditions are not significantly improved. 4. Type 2 diabetes mellitus with diabetic polyneuropathy, with long-term current use of insulin ([facility] [address]  -     COMPREHENSIVE METABOLIC PANEL  She is due for potassium and creatinine monitoring. We will check those today. No follow-ups on file. Subjective   SUBJECTIVE/OBJECTIVE:  Bayron Khan is a 63 y.o. female. Patient presents with:  1 Month Follow-Up: 6 week f/u      73-year-old female who presents for follow-up of iron deficiency anemia. She has been taking her iron supplement and sorbic acid. She is tolerating this well. She has noted no significant changes in her energy level. She continues to be short of breath from severe COPD. Patient continues to smoke. Patient has her diabetes much better controlled. She has now under 8 as far as her A1c. She otherwise has been taking her insulin. She denies any high or low blood sugars. She continues to be moderately depressed at times. She feels that she will not quit smoking at this time. The patients PMH, surgical history, family history, medications, allergies were all reviewed and updated as appropriate today.         Review of Systems       Objective   Physical Exam  Vitals and nursing note reviewed. Constitutional:       Appearance: Normal appearance. She is well-developed. HENT:      Head: Normocephalic and atraumatic. Right Ear: External ear normal.      Left Ear: External ear normal.      Nose: Nose normal.   Eyes:      Conjunctiva/sclera: Conjunctivae normal.      Pupils: Pupils are equal, round, and reactive to light. Cardiovascular:      Rate and Rhythm: Normal rate and regular rhythm. Heart sounds: Normal heart sounds. Pulmonary:      Effort: Pulmonary effort is normal.      Breath sounds: Normal breath sounds. Abdominal:      General: Bowel sounds are normal.      Palpations: Abdomen is soft. Musculoskeletal:         General: Normal range of motion. Cervical back: Normal range of motion and neck supple. Skin:     General: Skin is dry. Neurological:      Mental Status: She is alert and oriented to person, place, and time. Deep Tendon Reflexes: Reflexes are normal and symmetric. Psychiatric:         Behavior: Behavior normal.         Thought Content: Thought content normal.         Judgment: Judgment normal.            An electronic signature was used to authenticate this note.     --Sherine Kelley MD

## 2022-01-10 DIAGNOSIS — E03.9 HYPOTHYROIDISM, UNSPECIFIED TYPE: ICD-10-CM

## 2022-01-10 RX ORDER — LEVOTHYROXINE SODIUM 0.1 MG/1
TABLET ORAL
Qty: 30 TABLET | Refills: 3 | Status: SHIPPED | OUTPATIENT
Start: 2022-01-10 | End: 2022-05-09

## 2022-01-31 RX ORDER — ROPINIROLE 2 MG/1
TABLET, FILM COATED ORAL
Qty: 60 TABLET | Refills: 5 | Status: SHIPPED | OUTPATIENT
Start: 2022-01-31 | End: 2022-08-22

## 2022-02-10 ENCOUNTER — OFFICE VISIT (OUTPATIENT)
Dept: PULMONOLOGY | Age: 62
End: 2022-02-10
Payer: COMMERCIAL

## 2022-02-10 VITALS
OXYGEN SATURATION: 97 % | SYSTOLIC BLOOD PRESSURE: 136 MMHG | DIASTOLIC BLOOD PRESSURE: 68 MMHG | WEIGHT: 177 LBS | BODY MASS INDEX: 34.75 KG/M2 | HEIGHT: 60 IN | HEART RATE: 88 BPM | RESPIRATION RATE: 16 BRPM

## 2022-02-10 DIAGNOSIS — J44.9 CHRONIC OBSTRUCTIVE PULMONARY DISEASE, UNSPECIFIED COPD TYPE (HCC): Primary | ICD-10-CM

## 2022-02-10 DIAGNOSIS — G25.81 RLS (RESTLESS LEGS SYNDROME): ICD-10-CM

## 2022-02-10 PROCEDURE — 3023F SPIROM DOC REV: CPT | Performed by: INTERNAL MEDICINE

## 2022-02-10 PROCEDURE — 99214 OFFICE O/P EST MOD 30 MIN: CPT | Performed by: INTERNAL MEDICINE

## 2022-02-10 PROCEDURE — 3017F COLORECTAL CA SCREEN DOC REV: CPT | Performed by: INTERNAL MEDICINE

## 2022-02-10 PROCEDURE — G8428 CUR MEDS NOT DOCUMENT: HCPCS | Performed by: INTERNAL MEDICINE

## 2022-02-10 PROCEDURE — 4004F PT TOBACCO SCREEN RCVD TLK: CPT | Performed by: INTERNAL MEDICINE

## 2022-02-10 PROCEDURE — G8417 CALC BMI ABV UP PARAM F/U: HCPCS | Performed by: INTERNAL MEDICINE

## 2022-02-10 PROCEDURE — G8484 FLU IMMUNIZE NO ADMIN: HCPCS | Performed by: INTERNAL MEDICINE

## 2022-02-10 RX ORDER — ALBUTEROL SULFATE 90 UG/1
2 AEROSOL, METERED RESPIRATORY (INHALATION) EVERY 4 HOURS PRN
Qty: 1 EACH | Refills: 5 | Status: SHIPPED | OUTPATIENT
Start: 2022-02-10

## 2022-02-10 NOTE — PROGRESS NOTES
CHIEF COMPLAINT:  Restless legs syndrome     HPI:   Interval history:   COPD:   Cramps no better off salmeterol. Using just flovent. Planning to quit tobacco using vaping as bridge. RLS: uses Requip with benefit, 2 mg morning, 2 mg evening. Dose not changed. No compulsive behavior. No sleep attacks. Legs occasionally still give trouble, but she is managing. She feels she is doing well. Presenting history:   The patient is a 49 yo woman with hx of tobacco abuse, brain aneurysm, DM presents with chronic dyspnea on exertion. Patient complains of shortness of breath. Symptoms include dyspnea on exertion. Symptoms began a few years ago, gradually worsening since that time. The dyspnea occurs with moderate activity. Patient denies hemoptysis . Aggravating factors include exertion, exercise and climbing stairs. The patient reports an exercise tolerance of approximately 3 blocks on the flat and 1 flight of stairs, limited primarily by dyspnea and leg pain. She smokes 1 ppd x 32 years. She has used spiriva and advair in the past without benefit. Currently using symbicort and albuterol with some response. Initially followed by Dr. Laura Stovall reports that she has been smoking cigarettes. She started smoking about 44 years ago. She has a 58.50 pack-year smoking history. She has never used smokeless tobacco.    Objective:   Blood pressure 136/68, pulse 88, resp. rate 16, height 5' (1.524 m), weight 177 lb (80.3 kg), SpO2 97 %, not currently breastfeeding. Constitutional:  No acute distress. HENT:  Oropharynx is clear and moist.   Neck: No tracheal deviation present. Cardiovascular: Normal heart sounds. No lower extremity edema. Pulmonary/Chest: No wheezes. No rhonchi. No rales. No decreased breath sounds. No accessory muscle usage or stridor. Musculoskeletal: No cyanosis. No clubbing. Skin: Skin is warm and dry. Psychiatric: Normal mood and affect.   Neurologic: speech fluent, alert and oriented, strength symmetric         PFTs 10/5/12  FVC 2.72(88%) FEV1 2.12 (86%) FEV1/FVC ratio  78% TLC 3.82 (83%) DLCO 14.71 (68%)  No BD response  PFT 4/29/14 FEV1 2.07 L 86% TLC 3.69 L (80%) DLCO 13.76 64%  PFT 7/19/18 FEV1 2.03 L 88% TLC 3.91 L 85%   DLCO 12.22 58%    6MWT 1120 feet, low sat 95%    IMAGING:    LDCT 8/10/21  Mediastinum: Coronary artery calcifications are a marker of atherosclerosis. There are no enlarged thoracic lymph nodes.  Calcified granulomatous disease   is noted.       Lungs/pleura: The tracheobronchial tree is patent. Jane Callander is no pneumothorax   or pleural effusion. Jane Callander is left basilar scarring.       No change in the 2 mm solid left upper lobe pulmonary nodule.  No new   pulmonary nodules have developed.       Upper Abdomen: The liver is diffusely low in attenuation consistent with   hepatic steatosis.  Status post cholecystectomy.       Soft Tissues/Bones: Degenerative changes involve the thoracic spine.       There is a mildly enlarged left axillary lymph node measuring 1.2 x 1.2 cm,   likely reactive.           Impression   1. Unchanged solid subcentimeter left upper lobe pulmonary nodule. Echo 2015  - Left ventricle: The cavity size was normal. Wall thickness  was normal. Systolic function was normal. The estimated  ejection fraction was in the range of 60% to 65%. Wall  motion was normal; there were no regional wall motion  abnormalities. Left ventricular diastolic function  parameters were normal. Pulmonary arteries: PA peak  pressure: 37mm Hg (S).    3/12/17 CXR clear lung fields    ASSESSMENT AND PLAN:  · Pulmonary Centrilobular Emphysema    · Advair 115/21 BID (abdominal cramps not better off salmeterol), albuterol MDI and duonebs; intolerant of Spiriva due to sensation of swelling in throat. · Restless legs syndrome:   · Requip 2 mg at bedtime and up to 2 mg earlier in day.   She is doing well and is stable   · Depression: treated at [facility] in [facility], has been long term treated with Venlafaxine   · Tobacco abuse -  Considering vaping as bridge to quit     Greater than 44 pack year history. Pre-contemplative  · H/O vocal cord polyp. F/B Dr. Kalani Selby    · Should be scheduled for LDCT for [facility]CS Aug 2022 - see me after       Screening CT scan was considered in a lung cancer screening counseling and shared decision making visit today that included the following elements:    Eligibility: Age: 63. There are no signs or symptoms of lung cancer. Tobacco History 44 pack-years, quit zero years ago   Verbal counseling has been performed by me to include benefits and harms of screening, follow-up diagnostic testing, over-diagnosis, false positive rate, and total radiation exposure;    I have counseled on the importance of adherence to annual lung cancer LDCT screening, the impact of comorbidities and patient is willing to undergo diagnosis and treatment;    I have provided counseling on the importance of maintaining cigarette smoking abstinence if former smoker; or the importance of smoking cessation if current smoker and, if appropriate, furnishing of information about tobacco cessation interventions; and    I have furnished a written order for lung cancer screening with LDCT.       Order for Screening chest CT scan should be placed with documentation as below:   Beneficiary date of birth;    Actual pack - year smoking history (number) from above;    Current smoking status, and for former smokers, the number of years since quitting smoking from above  [facility] is [facility]   [employer] ([employer]) for [facility] Cover [phone]

## 2022-02-15 DIAGNOSIS — D50.9 IRON DEFICIENCY ANEMIA, UNSPECIFIED IRON DEFICIENCY ANEMIA TYPE: ICD-10-CM

## 2022-02-15 RX ORDER — ASCORBIC ACID 500 MG
TABLET ORAL
Qty: 30 TABLET | Refills: 3 | Status: SHIPPED | OUTPATIENT
Start: 2022-02-15

## 2022-02-15 NOTE — TELEPHONE ENCOUNTER
Last Office Visit  -  1/4/22  Next Office Visit  -  4/26/22    Last Filled  -    Last UDS -    Contract -

## 2022-02-16 DIAGNOSIS — E78.5 DYSLIPIDEMIA: ICD-10-CM

## 2022-02-16 RX ORDER — EZETIMIBE 10 MG/1
TABLET ORAL
Qty: 30 TABLET | Refills: 5 | Status: SHIPPED | OUTPATIENT
Start: 2022-02-16 | End: 2022-09-09

## 2022-02-22 ENCOUNTER — OFFICE VISIT (OUTPATIENT)
Dept: ENDOCRINOLOGY | Age: 62
End: 2022-02-22
Payer: COMMERCIAL

## 2022-02-22 VITALS
HEART RATE: 90 BPM | WEIGHT: 178.8 LBS | SYSTOLIC BLOOD PRESSURE: 133 MMHG | DIASTOLIC BLOOD PRESSURE: 66 MMHG | BODY MASS INDEX: 34.92 KG/M2 | OXYGEN SATURATION: 96 %

## 2022-02-22 DIAGNOSIS — E11.9 CONTROLLED TYPE 2 DIABETES MELLITUS WITHOUT COMPLICATION, WITH LONG-TERM CURRENT USE OF INSULIN (HCC): Primary | ICD-10-CM

## 2022-02-22 DIAGNOSIS — Z79.4 CONTROLLED TYPE 2 DIABETES MELLITUS WITHOUT COMPLICATION, WITH LONG-TERM CURRENT USE OF INSULIN (HCC): Primary | ICD-10-CM

## 2022-02-22 DIAGNOSIS — E03.9 ACQUIRED HYPOTHYROIDISM: ICD-10-CM

## 2022-02-22 LAB — HBA1C MFR BLD: 7.4 %

## 2022-02-22 PROCEDURE — 83036 HEMOGLOBIN GLYCOSYLATED A1C: CPT | Performed by: INTERNAL MEDICINE

## 2022-02-22 PROCEDURE — 2022F DILAT RTA XM EVC RTNOPTHY: CPT | Performed by: INTERNAL MEDICINE

## 2022-02-22 PROCEDURE — G8484 FLU IMMUNIZE NO ADMIN: HCPCS | Performed by: INTERNAL MEDICINE

## 2022-02-22 PROCEDURE — G8417 CALC BMI ABV UP PARAM F/U: HCPCS | Performed by: INTERNAL MEDICINE

## 2022-02-22 PROCEDURE — 3046F HEMOGLOBIN A1C LEVEL >9.0%: CPT | Performed by: INTERNAL MEDICINE

## 2022-02-22 PROCEDURE — 99214 OFFICE O/P EST MOD 30 MIN: CPT | Performed by: INTERNAL MEDICINE

## 2022-02-22 PROCEDURE — G8427 DOCREV CUR MEDS BY ELIG CLIN: HCPCS | Performed by: INTERNAL MEDICINE

## 2022-02-22 PROCEDURE — 3017F COLORECTAL CA SCREEN DOC REV: CPT | Performed by: INTERNAL MEDICINE

## 2022-02-22 PROCEDURE — 4004F PT TOBACCO SCREEN RCVD TLK: CPT | Performed by: INTERNAL MEDICINE

## 2022-02-22 NOTE — PROGRESS NOTES
Seen as patient for diabetes    Has seen Eri Jordan    Interim:  High glucose    Stopped trulicity due to diarrhea    Diagnosed with Type 2 diabetes mellitus in    Known diabetic complications: none   Uncontrolled, moderate    Current diabetic medications     Lantus 38 units  Metformin 50mmg BID    States has not been on any other medication    Last A1c  7.4%<-----7.8%<---- 10.8%<----- 9.5%<--- 11.9% <---- 7.1%    Prior visit with dietician: no  Current diet: on average, 3 meals per day  Drinks regular soda cut down from 6 to 3/day  Current exercise: walking   Current monitoring regimen: home blood tests -1  times daily     Has brought blood glucose log/meter: yes  Home blood sugar records: 130-203  Any episodes of hypoglycemia? ----  Worsened by high CHO    No Hx of CAD , PVD, CVA    Hyperlipidemia:   For   Years  Takes lipitor 40mg zetia  Controlled      Last eye exam:   Last foot exam:   Last microalbumin to creatinine ratio: 213 on   Lisinopril 5mg    She is on levothyroxine, stable    She is Moni's aunt    Past Medical History:   Diagnosis Date    Anxiety     [facility] disorder [facility] [address]     Brain aneurysm     [place]    Closed angle glaucoma     COPD (chronic obstructive pulmonary disease) ([facility] [address]     Hyperlipidemia     Hypertension     Hypothyroidism     Migraines     Open-angle glaucoma of both eyes     RLS (restless legs syndrome)     Rotator cuff disorder     right shoulder    TIA (transient ischemic attack) 11    Tobacco use disorder     Type II or unspecified type diabetes mellitus without mention of complication, not stated as uncontrolled     diet controlled     Past Surgical History:   Procedure Laterality Date    BLADDER SUSPENSION      CARPAL TUNNEL RELEASE Bilateral     right and left     SECTION      x 2    CHOLECYSTECTOMY      COLONOSCOPY      COLONOSCOPY  2016    diverticulosis    CYST REMOVAL  2006    hidradenitis?     EYE SURGERY Right 16 cataract    EYE SURGERY Left 2/2/16    cataract removal    FOOT SURGERY  2018    GLAUCOMA SURGERY Bilateral     twice in right eye    HYSTERECTOMY  2002/2015    total    KNEE SURGERY Right 11/30/2018    OTHER SURGICAL HISTORY  09/2015    left oopherectomy    OTHER SURGICAL HISTORY  04/10/2019    CYSTOSCOPY, TRANSVAGINAL TAPING, CYSTOCELE REPAIR     MN LAP,SLING OPERATION N/A 4/10/2019    CYSTOSCOPY, TRANSVAGINAL TAPING, CYSTOCELE REPAIR performed by Mily East MD at [address] Right 2008 and Brinda 1 2013    x2    THROAT SURGERY  06/2018    mass revmoved vocal cord     THROAT SURGERY      UPPER GASTROINTESTINAL ENDOSCOPY  09/12/2016    small bowel bx    URETHRA SURGERY  06/28/2018     \  Current Outpatient Medications   Medication Sig Dispense Refill    ezetimibe (ZETIA) 10 MG tablet TAKE ONE (1) TABLET BY MOUTH DAILY 30 tablet 5    fluticasone-salmeterol (ADVAIR HFA) 115-21 MCG/ACT inhaler INHALE ONE (1) PUFF INTO THE LUNGS TWO (2) TIMES DAILY 12 g 5    albuterol sulfate  (90 Base) MCG/ACT inhaler Inhale 2 puffs into the lungs every 4 hours as needed for Wheezing 1 each 5    rOPINIRole (REQUIP) 2 MG tablet TAKE ONE TABLET BY MOUTH EVERY NIGHT AT BEDTIME AND IN THE EVENING AS NEEDED 60 tablet 5    levothyroxine (SYNTHROID) 100 MCG tablet TAKE ONE TABLET BY MOUTH ONCE DAILY 30 tablet 3    Lurasidone HCl (LATUDA PO) Take by mouth      omeprazole (PRILOSEC) 40 MG delayed release capsule TAKE ONE CAPSULE BY MOUTH TWICE A DAY 60 capsule 2    losartan (COZAAR) 50 MG tablet TAKE ONE (1) TABLET BY MOUTH EVERY DAY 90 tablet 3    Insulin Pen Needle (TECHLITE PEN NEEDLES) 32G X 4 MM MISC USE WITH INSULIN ONCE DAILY 100 each 3    pravastatin (PRAVACHOL) 40 MG tablet TAKE ONE TABLET BY MOUTH EACH EVENING 30 tablet 5    insulin glargine (LANTUS SOLOSTAR) 100 UNIT/ML injection pen INJECT 26-36 UNITS UNDER THE SKIN ONCE DAILY 15 mL 2    lisinopril (PRINIVIL;ZESTRIL) 5 MG tablet TAKE ONE (1) TABLET BY MOUTH DAILY  90 tablet 1    ibuprofen (ADVIL;MOTRIN) 800 MG tablet TAKE ONE (1) TABLET BY MOUTH TWO (2) TIMES DAILY AS NEEDED FOR PAIN  60 tablet 5    metFORMIN (GLUCOPHAGE-XR) 500 MG extended release tablet TAKE ONE (1) TABLET TWICE DAILY BY MOUTH  60 tablet 5    ferrous gluconate (FERGON) 240 (27 Fe) MG tablet Take 1 tablet by mouth 3 times daily (with meals) 90 tablet 0    atorvastatin (LIPITOR) 40 MG tablet TAKE ONE (1) TABLET BY MOUTH DAILY  90 tablet 1    glucose monitoring kit (FREESTYLE) monitoring kit 1 kit by Does not apply route daily 1 kit 0    FreeStyle Lancets MISC 1 each by Does not apply route daily 100 each 3    blood glucose test strips (ASCENSIA AUTODISC VI;ONE TOUCH ULTRA TEST VI) strip 1 each by In Vitro route daily As needed. 100 each 3    methocarbamol (ROBAXIN) 750 MG tablet       fluorometholone (FML) 0.1 % ophthalmic suspension       fluticasone (FLONASE) 50 MCG/ACT nasal spray 2 sprays by Each Nostril route daily 1 Bottle 5    azelastine (ASTELIN) 0.1 % nasal spray 2 sprays by Nasal route 2 times daily Use in each nostril as directed 2 Bottle 5    loratadine (CLARITIN) 10 MG tablet TAKE ONE TABLET BY MOUTH ONCE DAILY  30 tablet 11    ipratropium-albuterol (DUONEB) 0.5-2.5 (3) MG/3ML SOLN nebulizer solution INHALE THREE (3) MLS INTO THE LUNGS EVERY FOUR (4) HOURS AS NEEDED FOR SHORTNESS OF BREATH  360 mL 5    FREESTYLE LITE strip USE DAILY AS NEEDED  50 each 5    B Complex Vitamins (VITAMIN B COMPLEX PO) Take by mouth      Ascorbic Acid (KARTIK-C PO) Take by mouth      estradiol (ESTRACE) 1 MG tablet       HYDROcodone-acetaminophen (NORCO) 5-325 MG per tablet       venlafaxine (EFFEXOR XR) 150 MG extended release capsule       venlafaxine (EFFEXOR-XR) 75 MG XR capsule   Take 150 mg by mouth See Admin Instructions 150mg in morning, 75mg at night      lamotrigine (LAMICTAL) 100 MG tablet Take 100 mg by mouth 2 times daily.       timolol (BETIMOL) 0.5 % ophthalmic solution 1 drop 2 times daily.  ascorbic acid (VITAMIN C) 500 MG tablet TAKE ONE (1) TABLET BY MOUTH TWO (2) TIMES DAILY 30 tablet 3    ferrous gluconate (FERGON) 324 (38 Fe) MG tablet Take 1 tablet by mouth 2 times daily 60 tablet 3    albuterol (PROVENTIL) (2.5 MG/3ML) 0.083% nebulizer solution Take 3 mLs by nebulization every 4 hours as needed for Wheezing 375 mL 5     No current facility-administered medications for this visit. Review of Systems  Please see scanned document dated and signed      Objective:      LMP  (LMP Unknown)   Wt Readings from Last 3 Encounters:   02/10/22 177 lb (80.3 kg)   01/04/22 177 lb (80.3 kg)   11/24/21 174 lb (78.9 kg)     Vitals:    02/22/22 1149   BP: 133/66   Pulse: 90   SpO2: 96%       Constitutional: Well-developed, appears stated age, cooperative, in no acute distress  H/E/N/M/T:atraumatic, normocephalic, external ears, nose, lips normal without lesions  Eyes: Lids, lashes, conjunctivae and sclerae normal, No proptosis, no redness  Neck: supple, symmetrical, no swelling  Skin: No obvious rashes or lesions present.   Skin and hair texture normal  Psychiatric: Judgement and Insight:  judgement and insight appear normal  Neuro: Normal without focal findings, speech is normal normal, speech is spontaneous  Chest: No labored breathing, no chest deformity, no stridor  Musculoskeletal: No joint deformity, swelling    Lab Reviewed   No components found for: CHLPL  Lab Results   Component Value Date    TRIG 201 (H) 01/26/2021    TRIG 869 (H) 09/21/2020    TRIG 352 (H) 05/12/2017     Lab Results   Component Value Date    HDL 75 (H) 08/23/2021    HDL 58 01/26/2021    HDL 49 09/21/2020     Lab Results   Component Value Date    LDLCALC 88 08/23/2021    LDLCALC 41 01/26/2021    LDLCALC see below 09/21/2020     Lab Results   Component Value Date    LABVLDL 37 08/23/2021    LABVLDL 40 01/26/2021    LABVLDL see below 09/21/2020     Lab Results Component Value Date    LABA1C 7.8 11/22/2021       Assessment:     Arlette Jones is a 63 y.o. female with :    1.T2DM: Uncontrolled, A1c high. Discussed goals, she cut down regular soda, still having 3/day, advised need to cut down. referred to dietician. Advised may need rapid acting insulin, started GLP-1 agonist but did not tolerate. A1c is better, adjust dose as fasting occasionally high. May try SGLT-2 inhibitor  2. Hypothyroidism: TSH at goal  3. HLD: LDL at goal  4. Obesity       Plan:      Lantus 42 units, advised self titration   continue metformin   Advise to check blood sugar 2 times a day   Patient to send blood sugar log for titration. Advise to low simple carbohydrate and protein with each  meal diet. Diabetes Care: recommend yearly eye exam, foot exam and urine microalbumin to   creatinine ratio. Patient is up-to-date.

## 2022-03-08 DIAGNOSIS — E11.00 UNCONTROLLED TYPE 2 DIABETES MELLITUS WITH HYPEROSMOLARITY WITHOUT COMA, WITHOUT LONG-TERM CURRENT USE OF INSULIN (HCC): ICD-10-CM

## 2022-03-08 RX ORDER — METFORMIN HYDROCHLORIDE 500 MG/1
TABLET, EXTENDED RELEASE ORAL
Qty: 60 TABLET | Refills: 5 | Status: SHIPPED | OUTPATIENT
Start: 2022-03-08 | End: 2022-09-28

## 2022-03-08 NOTE — TELEPHONE ENCOUNTER
Last Office Visit  -  1/4/22  Next Office Visit  -  n/a    Last Filled  -    Last UDS -    Contract -

## 2022-03-10 DIAGNOSIS — E11.00 UNCONTROLLED TYPE 2 DIABETES MELLITUS WITH HYPEROSMOLARITY WITHOUT COMA, WITHOUT LONG-TERM CURRENT USE OF INSULIN (HCC): ICD-10-CM

## 2022-03-10 RX ORDER — INSULIN GLARGINE 100 [IU]/ML
INJECTION, SOLUTION SUBCUTANEOUS
Qty: 15 ML | Refills: 2 | Status: SHIPPED | OUTPATIENT
Start: 2022-03-10 | End: 2022-06-01 | Stop reason: SDUPTHER

## 2022-03-10 NOTE — TELEPHONE ENCOUNTER
Requested Prescriptions     Pending Prescriptions Disp Refills    LANTUS SOLOSTAR 100 UNIT/ML injection pen [Pharmacy Med Name: LANTUS SOLOSTAR 100/ML SOLN PEN-INJ] 15 mL 2     Sig: INJECT 26-36 UNITS UNDER THE SKIN ONCE DAILY           LOV 2/22/22  NOV 6/1/22     Last refill 11/8/21

## 2022-03-14 RX ORDER — OMEPRAZOLE 40 MG/1
CAPSULE, DELAYED RELEASE ORAL
Qty: 60 CAPSULE | Refills: 2 | Status: SHIPPED | OUTPATIENT
Start: 2022-03-14 | End: 2022-07-11

## 2022-03-16 RX ORDER — IBUPROFEN 800 MG/1
TABLET ORAL
Qty: 60 TABLET | Refills: 5 | Status: SHIPPED | OUTPATIENT
Start: 2022-03-16 | End: 2022-09-09

## 2022-03-16 NOTE — TELEPHONE ENCOUNTER
Last Office Visit  -  1/4/22  Next Office Visit  -  5/12/22    Last Filled  -    Last UDS -    Contract -

## 2022-03-24 RX ORDER — FLUTICASONE PROPIONATE 220 UG/1
AEROSOL, METERED RESPIRATORY (INHALATION)
Qty: 12 G | Refills: 5 | Status: SHIPPED | OUTPATIENT
Start: 2022-03-24 | End: 2022-09-06

## 2022-04-07 DIAGNOSIS — J30.9 ALLERGIC RHINITIS: ICD-10-CM

## 2022-04-07 RX ORDER — LORATADINE 10 MG/1
TABLET ORAL
Qty: 30 TABLET | Refills: 11 | Status: SHIPPED | OUTPATIENT
Start: 2022-04-07

## 2022-05-09 DIAGNOSIS — E03.9 HYPOTHYROIDISM, UNSPECIFIED TYPE: ICD-10-CM

## 2022-05-09 RX ORDER — LEVOTHYROXINE SODIUM 0.1 MG/1
TABLET ORAL
Qty: 30 TABLET | Refills: 3 | Status: SHIPPED | OUTPATIENT
Start: 2022-05-09 | End: 2022-08-30

## 2022-05-12 ENCOUNTER — OFFICE VISIT (OUTPATIENT)
Dept: FAMILY MEDICINE CLINIC | Age: 62
End: 2022-05-12
Payer: COMMERCIAL

## 2022-05-12 VITALS
OXYGEN SATURATION: 98 % | DIASTOLIC BLOOD PRESSURE: 60 MMHG | WEIGHT: 179 LBS | HEIGHT: 60 IN | BODY MASS INDEX: 35.14 KG/M2 | HEART RATE: 84 BPM | SYSTOLIC BLOOD PRESSURE: 140 MMHG

## 2022-05-12 DIAGNOSIS — D50.9 IRON DEFICIENCY ANEMIA, UNSPECIFIED IRON DEFICIENCY ANEMIA TYPE: ICD-10-CM

## 2022-05-12 DIAGNOSIS — I65.23 BILATERAL CAROTID ARTERY STENOSIS: ICD-10-CM

## 2022-05-12 DIAGNOSIS — E11.00 UNCONTROLLED TYPE 2 DIABETES MELLITUS WITH HYPEROSMOLARITY WITHOUT COMA, WITHOUT LONG-TERM CURRENT USE OF INSULIN (HCC): ICD-10-CM

## 2022-05-12 DIAGNOSIS — J43.2 CENTRILOBULAR EMPHYSEMA (HCC): ICD-10-CM

## 2022-05-12 DIAGNOSIS — E03.9 HYPOTHYROIDISM, UNSPECIFIED TYPE: Primary | ICD-10-CM

## 2022-05-12 DIAGNOSIS — M54.50 LOW BACK PAIN WITHOUT SCIATICA, UNSPECIFIED BACK PAIN LATERALITY, UNSPECIFIED CHRONICITY: ICD-10-CM

## 2022-05-12 LAB
BILIRUBIN, POC: NORMAL
BLOOD URINE, POC: NORMAL
CLARITY, POC: CLEAR
COLOR, POC: NORMAL
GLUCOSE URINE, POC: NORMAL
HBA1C MFR BLD: 8.2 %
KETONES, POC: NORMAL
LEUKOCYTE EST, POC: NORMAL
NITRITE, POC: NORMAL
PH, POC: 5
PROTEIN, POC: NORMAL
SPECIFIC GRAVITY, POC: 1.01
UROBILINOGEN, POC: 0.2

## 2022-05-12 PROCEDURE — G8427 DOCREV CUR MEDS BY ELIG CLIN: HCPCS | Performed by: FAMILY MEDICINE

## 2022-05-12 PROCEDURE — 83036 HEMOGLOBIN GLYCOSYLATED A1C: CPT | Performed by: FAMILY MEDICINE

## 2022-05-12 PROCEDURE — G8417 CALC BMI ABV UP PARAM F/U: HCPCS | Performed by: FAMILY MEDICINE

## 2022-05-12 PROCEDURE — 2022F DILAT RTA XM EVC RTNOPTHY: CPT | Performed by: FAMILY MEDICINE

## 2022-05-12 PROCEDURE — 81002 URINALYSIS NONAUTO W/O SCOPE: CPT | Performed by: FAMILY MEDICINE

## 2022-05-12 PROCEDURE — 3023F SPIROM DOC REV: CPT | Performed by: FAMILY MEDICINE

## 2022-05-12 PROCEDURE — 99214 OFFICE O/P EST MOD 30 MIN: CPT | Performed by: FAMILY MEDICINE

## 2022-05-12 PROCEDURE — 3017F COLORECTAL CA SCREEN DOC REV: CPT | Performed by: FAMILY MEDICINE

## 2022-05-12 PROCEDURE — 4004F PT TOBACCO SCREEN RCVD TLK: CPT | Performed by: FAMILY MEDICINE

## 2022-05-12 PROCEDURE — 3052F HG A1C>EQUAL 8.0%<EQUAL 9.0%: CPT | Performed by: FAMILY MEDICINE

## 2022-05-12 RX ORDER — DOXYCYCLINE HYCLATE 50 MG/1
CAPSULE, GELATIN COATED ORAL
Qty: 60 TABLET | Refills: 3 | Status: SHIPPED | OUTPATIENT
Start: 2022-05-12 | End: 2022-10-10

## 2022-05-12 RX ORDER — LOSARTAN POTASSIUM 100 MG/1
TABLET ORAL
Qty: 90 TABLET | Refills: 3 | Status: SHIPPED | OUTPATIENT
Start: 2022-05-12

## 2022-05-12 ASSESSMENT — PATIENT HEALTH QUESTIONNAIRE - PHQ9
9. THOUGHTS THAT YOU WOULD BE BETTER OFF DEAD, OR OF HURTING YOURSELF: 0
6. FEELING BAD ABOUT YOURSELF - OR THAT YOU ARE A FAILURE OR HAVE LET YOURSELF OR YOUR FAMILY DOWN: 0
SUM OF ALL RESPONSES TO PHQ QUESTIONS 1-9: 2
1. LITTLE INTEREST OR PLEASURE IN DOING THINGS: 0
4. FEELING TIRED OR HAVING LITTLE ENERGY: 1
SUM OF ALL RESPONSES TO PHQ9 QUESTIONS 1 & 2: 0
3. TROUBLE FALLING OR STAYING ASLEEP: 1
5. POOR APPETITE OR OVEREATING: 0
SUM OF ALL RESPONSES TO PHQ QUESTIONS 1-9: 2
7. TROUBLE CONCENTRATING ON THINGS, SUCH AS READING THE NEWSPAPER OR WATCHING TELEVISION: 0
10. IF YOU CHECKED OFF ANY PROBLEMS, HOW DIFFICULT HAVE THESE PROBLEMS MADE IT FOR YOU TO DO YOUR WORK, TAKE CARE OF THINGS AT HOME, OR GET ALONG WITH OTHER PEOPLE: 0
SUM OF ALL RESPONSES TO PHQ QUESTIONS 1-9: 2
SUM OF ALL RESPONSES TO PHQ QUESTIONS 1-9: 2
8. MOVING OR SPEAKING SO SLOWLY THAT OTHER PEOPLE COULD HAVE NOTICED. OR THE OPPOSITE, BEING SO FIGETY OR RESTLESS THAT YOU HAVE BEEN MOVING AROUND A LOT MORE THAN USUAL: 0
2. FEELING DOWN, DEPRESSED OR HOPELESS: 0

## 2022-05-12 NOTE — PROGRESS NOTES
Ava Esquivel (:  1960) is a 63 y.o. female,Established patient, here for evaluation of the following chief complaint(s):  3 Month Follow-Up         ASSESSMENT/PLAN:  1. Hypothyroidism, unspecified type  Lab Results   Component Value Date    TSH 2.42 2021   Well-controlled    2. Uncontrolled type 2 diabetes mellitus with hyperosmolarity without coma, without long-term current use of insulin ([facility])  -     POCT glycosylated hemoglobin (Hb A1C)  Lab Results   Component Value Date    LABA1C 8.2 2022     Lab Results   Component Value Date    .1 2021   Poorly controlled at this time,     3. Iron deficiency anemia, unspecified iron deficiency anemia type  4. Centrilobular emphysema ([address]  5. Low back pain without sciatica, unspecified back pain laterality, unspecified chronicity  -     POCT Urinalysis no Micro  Urine negative,   6. Bilateral carotid artery stenosis  -     VL DUP CAROTID BILATERAL; Future      No follow-ups on file. Subjective   SUBJECTIVE/OBJECTIVE:  Ava Esquivel is a 63 y.o. female. Patient presents with:  3 Month Follow-Up    Patient is a 62 yo female. 2 weeks back pain, left lower back. Patient not certain that it was a pulled muscle versus a urinary tract infection. Patient has had no dysuria. Has had no fever. Patient does have a history of back pain. Patient with a history of poorly controlled diabetes mellitus. Overall has been trying to follow a good diet. She has had no significant polyuria polyphagia or polydipsia    Patient has a history of iron deficiency anemia. Patient's been taking medications regularly for this. Patient has had a history of bilateral carotid artery stenosis in the past and needs follow-up on this. The patients PMH, surgical history, family history, medications, allergies were all reviewed and updated as appropriate today.         Review of Systems       Objective   Physical Exam  Vitals and nursing note reviewed. Constitutional:       Appearance: Normal appearance. She is well-developed. HENT:      Head: Normocephalic and atraumatic. Right Ear: External ear normal.      Left Ear: External ear normal.      Nose: Nose normal.   Eyes:      Conjunctiva/sclera: Conjunctivae normal.      Pupils: Pupils are equal, round, and reactive to light. Cardiovascular:      Rate and Rhythm: Normal rate and regular rhythm. Heart sounds: Normal heart sounds. Pulmonary:      Effort: Pulmonary effort is normal.      Breath sounds: Normal breath sounds. Abdominal:      General: Bowel sounds are normal.      Palpations: Abdomen is soft. Musculoskeletal:         General: Normal range of motion. Cervical back: Normal range of motion and neck supple. Skin:     General: Skin is dry. Neurological:      Mental Status: She is alert and oriented to person, place, and time. Deep Tendon Reflexes: Reflexes are normal and symmetric. Psychiatric:         Behavior: Behavior normal.         Thought Content: Thought content normal.         Judgment: Judgment normal.              An electronic signature was used to authenticate this note.     --Robyn Jenkins MD

## 2022-05-12 NOTE — TELEPHONE ENCOUNTER
Last Office Visit  -  5/12/22  Next Office Visit  -  n/a    Last Filled  -    Last UDS -    Contract -

## 2022-05-13 ENCOUNTER — TELEPHONE (OUTPATIENT)
Dept: FAMILY MEDICINE CLINIC | Age: 62
End: 2022-05-13

## 2022-05-13 DIAGNOSIS — G89.29 CHRONIC LOW BACK PAIN WITHOUT SCIATICA, UNSPECIFIED BACK PAIN LATERALITY: Primary | ICD-10-CM

## 2022-05-13 DIAGNOSIS — M54.50 CHRONIC LOW BACK PAIN WITHOUT SCIATICA, UNSPECIFIED BACK PAIN LATERALITY: Primary | ICD-10-CM

## 2022-05-13 NOTE — TELEPHONE ENCOUNTER
----- Message from Mai Jezshannon sent at 5/13/2022  1:00 PM EDT -----  Subject: Referral Request    QUESTIONS   Reason for referral request? pain management   Has the physician seen you for this condition before? No   Preferred Specialist (if applicable)? Do you already have an appointment scheduled? No  Additional Information for Provider? current pain management will be   closing, pt is requesting a new referral to [facility].  ---------------------------------------------------------------------------  --------------  CALL BACK INFO  What is the best way for the office to contact you? OK to leave message on   voicemail  Preferred Call Back Phone Number? [phone]  ---------------------------------------------------------------------------  --------------  SCRIPT ANSWERS  Relationship to Patient?  Self

## 2022-05-24 ENCOUNTER — TELEPHONE (OUTPATIENT)
Dept: FAMILY MEDICINE CLINIC | Age: 62
End: 2022-05-24

## 2022-05-24 RX ORDER — CALCIUM CITRATE/VITAMIN D3 200MG-6.25
TABLET ORAL
Qty: 100 EACH | Refills: 3 | Status: SHIPPED | OUTPATIENT
Start: 2022-05-24

## 2022-05-24 NOTE — TELEPHONE ENCOUNTER
Last Office Visit  -  5/12/22  Next Office Visit  -  8/11/22    Last Filled  -    Last UDS -    Contract -

## 2022-05-24 NOTE — TELEPHONE ENCOUNTER
----- Message from Tesfaye Jnfelix sent at 5/24/2022 10:43 AM EDT -----  Subject: Referral Request    QUESTIONS   Reason for referral request? Patient is calling in to see if we can fax   over the referral for pain management because they have not received it if   can fax number is [phone]   Has the physician seen you for this condition before? Yes  Select a date? 2022-05-12  Select the Provider the patient wants to be referred to, if known (PCP or   Specialist)? Campos Dave   Preferred Specialist (if applicable)? Do you already have an appointment scheduled? No  Additional Information for Provider?   ---------------------------------------------------------------------------  --------------  CALL BACK INFO  What is the best way for the office to contact you? OK to leave message on   voicemail  Preferred Call Back Phone Number? [phone]  ---------------------------------------------------------------------------  --------------  SCRIPT ANSWERS  Relationship to Patient?  Self

## 2022-05-26 ENCOUNTER — TELEPHONE (OUTPATIENT)
Dept: FAMILY MEDICINE CLINIC | Age: 62
End: 2022-05-26

## 2022-05-26 NOTE — TELEPHONE ENCOUNTER
----- Message from Demetri Khan sent at 5/26/2022  2:26 PM EDT -----  Subject: Message to Provider    QUESTIONS  Information for Provider? Patient calling to give the correct fax number   to the pain clinic for referral fax [phone]  ---------------------------------------------------------------------------  --------------  [address]  What is the best way for the office to contact you? Do not leave any   message, patient will call back for answer  Preferred Call Back Phone Number? [phone]  ---------------------------------------------------------------------------  --------------  SCRIPT ANSWERS  Relationship to Patient?  Self

## 2022-06-01 ENCOUNTER — OFFICE VISIT (OUTPATIENT)
Dept: ENDOCRINOLOGY | Age: 62
End: 2022-06-01
Payer: COMMERCIAL

## 2022-06-01 VITALS
WEIGHT: 178.2 LBS | SYSTOLIC BLOOD PRESSURE: 142 MMHG | HEART RATE: 96 BPM | OXYGEN SATURATION: 96 % | DIASTOLIC BLOOD PRESSURE: 75 MMHG | BODY MASS INDEX: 34.99 KG/M2 | HEIGHT: 60 IN

## 2022-06-01 DIAGNOSIS — E03.9 ACQUIRED HYPOTHYROIDISM: ICD-10-CM

## 2022-06-01 DIAGNOSIS — E11.65 UNCONTROLLED TYPE 2 DIABETES MELLITUS WITH HYPERGLYCEMIA (HCC): Primary | ICD-10-CM

## 2022-06-01 PROCEDURE — 99214 OFFICE O/P EST MOD 30 MIN: CPT | Performed by: INTERNAL MEDICINE

## 2022-06-01 PROCEDURE — 2022F DILAT RTA XM EVC RTNOPTHY: CPT | Performed by: INTERNAL MEDICINE

## 2022-06-01 PROCEDURE — 4004F PT TOBACCO SCREEN RCVD TLK: CPT | Performed by: INTERNAL MEDICINE

## 2022-06-01 PROCEDURE — G8427 DOCREV CUR MEDS BY ELIG CLIN: HCPCS | Performed by: INTERNAL MEDICINE

## 2022-06-01 PROCEDURE — 3052F HG A1C>EQUAL 8.0%<EQUAL 9.0%: CPT | Performed by: INTERNAL MEDICINE

## 2022-06-01 PROCEDURE — 3017F COLORECTAL CA SCREEN DOC REV: CPT | Performed by: INTERNAL MEDICINE

## 2022-06-01 PROCEDURE — G8417 CALC BMI ABV UP PARAM F/U: HCPCS | Performed by: INTERNAL MEDICINE

## 2022-06-01 RX ORDER — INSULIN GLARGINE 100 [IU]/ML
INJECTION, SOLUTION SUBCUTANEOUS
Qty: 15 ML | Refills: 2 | Status: SHIPPED | OUTPATIENT
Start: 2022-06-01 | End: 2022-10-14

## 2022-06-01 NOTE — PROGRESS NOTES
Seen as patient for diabetes    Has seen Jose Taylor    Interim:  High glucose  States had too many candies    Stopped trulicity due to diarrhea    Diagnosed with Type 2 diabetes mellitus in    Known diabetic complications: none   Uncontrolled, moderate    Current diabetic medications     Lantus 42 units  Metformin 500mg BID    States has not been on any other medication    Last A1c 8.2%<-----  7.4%<-----7.8%<---- 10.8%<----- 9.5%<--- 11.9% <---- 7.1%    Prior visit with dietician: no  Current diet: on average, 3 meals per day  Drinks regular soda cut down from 6 to 3/day  Current exercise: walking   Current monitoring regimen: home blood tests -1  times daily     Has brought blood glucose log/meter: yes  Home blood sugar records: 139-263  Any episodes of hypoglycemia? ----  Worsened by high CHO    No Hx of CAD , PVD, CVA    Hyperlipidemia:   For   Years  Takes lipitor 40mg zetia  Controlled      Last eye exam:   Last foot exam:   Last microalbumin to creatinine ratio: 213 on   Lisinopril 5mg    She is on levothyroxine, stable    She is Moni's aunt    Past Medical History:   Diagnosis Date    Anxiety     B[facility] disorder ([facility] [address]     Brain aneurysm     [place]    Closed angle glaucoma     COPD (chronic obstructive pulmonary disease) ([facility] [address]     Hyperlipidemia     Hypertension     Hypothyroidism     Migraines     Open-angle glaucoma of both eyes     RLS (restless legs syndrome)     Rotator cuff disorder     right shoulder    TIA (transient ischemic attack) 11    Tobacco use disorder     Type II or unspecified type diabetes mellitus without mention of complication, not stated as uncontrolled     diet controlled     Past Surgical History:   Procedure Laterality Date    BLADDER SUSPENSION      CARPAL TUNNEL RELEASE Bilateral     right and left     SECTION      x 2    CHOLECYSTECTOMY      COLONOSCOPY      COLONOSCOPY  2016    diverticulosis    CYST REMOVAL   hidradenitis?     EYE SURGERY Right 1/25/16    cataract    EYE SURGERY Left 2/2/16    cataract removal    FOOT SURGERY  2018    GLAUCOMA SURGERY Bilateral     twice in right eye    HYSTERECTOMY  2002/2015    total    KNEE SURGERY Right 11/30/2018    OTHER SURGICAL HISTORY  09/2015    left oopherectomy    OTHER SURGICAL HISTORY  04/10/2019    CYSTOSCOPY, TRANSVAGINAL TAPING, CYSTOCELE REPAIR     ND LAP,SLING OPERATION N/A 4/10/2019    CYSTOSCOPY, TRANSVAGINAL TAPING, CYSTOCELE REPAIR performed by Alli Marrero MD at [facility] Right 2008 and Brinda 1 2013    x2    THROAT SURGERY  06/2018    mass revmoved vocal cord     THROAT SURGERY      UPPER GASTROINTESTINAL ENDOSCOPY  09/12/2016    small bowel bx    URETHRA SURGERY  06/28/2018     \  Current Outpatient Medications   Medication Sig Dispense Refill    TRUE METRIX BLOOD GLUCOSE TEST strip TEST BLOOD SUGAR DAILY AS DIRECTED 100 each 3    losartan (COZAAR) 100 MG tablet TAKE ONE (1) TABLET BY MOUTH EVERY DAY 90 tablet 3    ferrous gluconate (FERGON) 324 (38 Fe) MG tablet TAKE ONE (1) TABLET BY MOUTH TWO (2) TIMES DAILY 60 tablet 3    levothyroxine (SYNTHROID) 100 MCG tablet TAKE ONE TABLET BY MOUTH ONCE DAILY 30 tablet 3    loratadine (CLARITIN) 10 MG tablet TAKE ONE TABLET BY MOUTH ONCE DAILY 30 tablet 11    FLOVENT  MCG/ACT inhaler INHALE ONE (1) PUFF INTO THE LUNGS TWO (2) TIMES DAILY 12 g 5    ibuprofen (ADVIL;MOTRIN) 800 MG tablet TAKE ONE (1) TABLET BY MOUTH TWO (2) TIMES DAILY AS NEEDED FOR PAIN 60 tablet 5    omeprazole (PRILOSEC) 40 MG delayed release capsule TAKE ONE CAPSULE BY MOUTH TWICE A DAY 60 capsule 2    LANTUS SOLOSTAR 100 UNIT/ML injection pen INJECT 26-36 UNITS UNDER THE SKIN ONCE DAILY 15 mL 2    metFORMIN (GLUCOPHAGE-XR) 500 MG extended release tablet TAKE ONE (1) TABLET TWICE DAILY BY MOUTH 60 tablet 5    ezetimibe (ZETIA) 10 MG tablet TAKE ONE (1) TABLET BY MOUTH DAILY 30 tablet 5    ascorbic acid (VITAMIN C) 500 MG tablet TAKE ONE (1) TABLET BY MOUTH TWO (2) TIMES DAILY 30 tablet 3    fluticasone-salmeterol (ADVAIR HFA) 115-21 MCG/ACT inhaler INHALE ONE (1) PUFF INTO THE LUNGS TWO (2) TIMES DAILY 12 g 5    albuterol sulfate  (90 Base) MCG/ACT inhaler Inhale 2 puffs into the lungs every 4 hours as needed for Wheezing 1 each 5    rOPINIRole (REQUIP) 2 MG tablet TAKE ONE TABLET BY MOUTH EVERY NIGHT AT BEDTIME AND IN THE EVENING AS NEEDED 60 tablet 5    Lurasidone HCl (LATUDA PO) Take by mouth      Insulin Pen Needle (TECHLITE PEN NEEDLES) 32G X 4 MM MISC USE WITH INSULIN ONCE DAILY 100 each 3    pravastatin (PRAVACHOL) 40 MG tablet TAKE ONE TABLET BY MOUTH EACH EVENING 30 tablet 5    ferrous gluconate (FERGON) 240 (27 Fe) MG tablet Take 1 tablet by mouth 3 times daily (with meals) 90 tablet 0    atorvastatin (LIPITOR) 40 MG tablet TAKE ONE (1) TABLET BY MOUTH DAILY  90 tablet 1    glucose monitoring kit (FREESTYLE) monitoring kit 1 kit by Does not apply route daily 1 kit 0    FreeStyle Lancets MISC 1 each by Does not apply route daily 100 each 3    methocarbamol (ROBAXIN) 750 MG tablet       fluorometholone (FML) 0.1 % ophthalmic suspension       fluticasone (FLONASE) 50 MCG/ACT nasal spray 2 sprays by Each Nostril route daily 1 Bottle 5    azelastine (ASTELIN) 0.1 % nasal spray 2 sprays by Nasal route 2 times daily Use in each nostril as directed 2 Bottle 5    ipratropium-albuterol (DUONEB) 0.5-2.5 (3) MG/3ML SOLN nebulizer solution INHALE THREE (3) MLS INTO THE LUNGS EVERY FOUR (4) HOURS AS NEEDED FOR SHORTNESS OF BREATH  360 mL 5    FREESTYLE LITE strip USE DAILY AS NEEDED  50 each 5    B Complex Vitamins (VITAMIN B COMPLEX PO) Take by mouth      Ascorbic Acid (KARTIK-C PO) Take by mouth      estradiol (ESTRACE) 1 MG tablet       HYDROcodone-acetaminophen (NORCO) 5-325 MG per tablet       venlafaxine (EFFEXOR XR) 150 MG extended release capsule       venlafaxine (EFFEXOR-XR) 75 MG XR capsule   Take 150 mg by mouth See Admin Instructions 150mg in morning, 75mg at night      lamotrigine (LAMICTAL) 100 MG tablet Take 100 mg by mouth 2 times daily.  timolol (BETIMOL) 0.5 % ophthalmic solution 1 drop 2 times daily.  albuterol (PROVENTIL) (2.5 MG/3ML) 0.083% nebulizer solution Take 3 mLs by nebulization every 4 hours as needed for Wheezing 375 mL 5     No current facility-administered medications for this visit. Review of Systems  Please see scanned document dated and signed      Objective:      BP (!) 142/75   Pulse 96   Ht 5' (1.524 m)   Wt 178 lb 3.2 oz (80.8 kg)   LMP  (LMP Unknown)   SpO2 96%   Breastfeeding No   BMI 34.80 kg/m²   Wt Readings from Last 3 Encounters:   06/01/22 178 lb 3.2 oz (80.8 kg)   05/12/22 179 lb (81.2 kg)   02/22/22 178 lb 12.8 oz (81.1 kg)     Vitals:    06/01/22 1103   BP: (!) 142/75   Pulse: 96   SpO2: 96%       Constitutional: Well-developed, appears stated age, cooperative, in no acute distress  H/E/N/M/T:atraumatic, normocephalic, external ears, nose, lips normal without lesions  Eyes: Lids, lashes, conjunctivae and sclerae normal, No proptosis, no redness  Neck: supple, symmetrical, no swelling  Skin: No obvious rashes or lesions present.   Skin and hair texture normal  Psychiatric: Judgement and Insight:  judgement and insight appear normal  Neuro: Normal without focal findings, speech is normal normal, speech is spontaneous  Chest: No labored breathing, no chest deformity, no stridor  Musculoskeletal: No joint deformity, swelling    Lab Reviewed   No components found for: CHLPL  Lab Results   Component Value Date    TRIG 201 (H) 01/26/2021    TRIG 869 (H) 09/21/2020    TRIG 352 (H) 05/12/2017     Lab Results   Component Value Date    HDL 75 (H) 08/23/2021    HDL 58 01/26/2021    HDL 49 09/21/2020     Lab Results   Component Value Date    LDLCALC 88 08/23/2021 [address] 41 01/26/2021    [address] see below 09/21/2020 Lab Results   Component Value Date    LABVLDL 37 08/23/2021    LABVLDL 40 01/26/2021    LABVLDL see below 09/21/2020     Lab Results   Component Value Date    LABA1C 8.2 05/12/2022       Assessment:     Negrita Grayson is a 63 y.o. female with :    1.T2DM: Uncontrolled, A1c high. Discussed goals, she cut down regular soda, still having 3/day, advised need to cut down. referred to dietician. Advised may need rapid acting insulin, started GLP-1 agonist but did not tolerate. A1c is better, adjust dose as fasting occasionally high. May try SGLT-2 inhibitor  Adjust lantus 48 units  2. Hypothyroidism: TSH at goal  3. HLD: LDL at goal  4. Obesity       Plan:      Lantus 48 units, advised self titration   continue metformin   Advise to check blood sugar 2 times a day   Patient to send blood sugar log for titration. Advise to low simple carbohydrate and protein with each  meal diet. Diabetes Care: recommend yearly eye exam, foot exam and urine microalbumin to   creatinine ratio. Patient is up-to-date.

## 2022-06-10 ENCOUNTER — HOSPITAL ENCOUNTER (OUTPATIENT)
Dept: VASCULAR LAB | Age: 62
Discharge: HOME | End: 2022-06-10
Payer: COMMERCIAL

## 2022-06-10 DIAGNOSIS — I65.23 BILATERAL CAROTID ARTERY STENOSIS: ICD-10-CM

## 2022-06-10 PROCEDURE — 93880 EXTRACRANIAL BILAT STUDY: CPT

## 2022-07-05 ENCOUNTER — OFFICE VISIT (OUTPATIENT)
Dept: FAMILY MEDICINE CLINIC | Age: 62
End: 2022-07-05
Payer: COMMERCIAL

## 2022-07-05 VITALS
HEART RATE: 93 BPM | BODY MASS INDEX: 34.37 KG/M2 | OXYGEN SATURATION: 94 % | DIASTOLIC BLOOD PRESSURE: 60 MMHG | WEIGHT: 176 LBS | SYSTOLIC BLOOD PRESSURE: 140 MMHG

## 2022-07-05 DIAGNOSIS — B37.2 SKIN YEAST INFECTION: Primary | ICD-10-CM

## 2022-07-05 PROCEDURE — G8417 CALC BMI ABV UP PARAM F/U: HCPCS | Performed by: NURSE PRACTITIONER

## 2022-07-05 PROCEDURE — 99213 OFFICE O/P EST LOW 20 MIN: CPT | Performed by: NURSE PRACTITIONER

## 2022-07-05 PROCEDURE — 4004F PT TOBACCO SCREEN RCVD TLK: CPT | Performed by: NURSE PRACTITIONER

## 2022-07-05 PROCEDURE — G8427 DOCREV CUR MEDS BY ELIG CLIN: HCPCS | Performed by: NURSE PRACTITIONER

## 2022-07-05 PROCEDURE — 3017F COLORECTAL CA SCREEN DOC REV: CPT | Performed by: NURSE PRACTITIONER

## 2022-07-05 RX ORDER — NYSTATIN 100000 [USP'U]/G
POWDER TOPICAL
Qty: 60 G | Refills: 0 | Status: SHIPPED | OUTPATIENT
Start: 2022-07-05 | End: 2022-09-06

## 2022-07-05 RX ORDER — ACETAMINOPHEN 160 MG
TABLET,DISINTEGRATING ORAL
COMMUNITY
Start: 2022-06-06

## 2022-07-05 RX ORDER — NYSTATIN 100000 U/G
OINTMENT TOPICAL
Qty: 30 G | Refills: 1 | Status: SHIPPED | OUTPATIENT
Start: 2022-07-05 | End: 2022-09-06

## 2022-07-05 RX ORDER — LATANOPROST 50 UG/ML
SOLUTION/ DROPS OPHTHALMIC
COMMUNITY
Start: 2022-06-24

## 2022-07-05 ASSESSMENT — ENCOUNTER SYMPTOMS
WHEEZING: 0
SHORTNESS OF BREATH: 0

## 2022-07-05 NOTE — PROGRESS NOTES
Patient: Flynn Brambila is a 63 y.o. female who presents today with the following Chief Complaint(s):  Chief Complaint   Patient presents with    Wound Infection     nauseated feeling normally, when touching it it feels like it is ripping, from scar from , x36 years ago, red and swollen, bruised looking in middle, spreading across scar, noticed on friday          HPI   Redness started on right side of old  scar- noticed it Friday- now the redness has spread on whole scar- no fevers or chills. Has a little nausea. States her  scar has opened in the past. Denies any drainage. Current Outpatient Medications   Medication Sig Dispense Refill    Cholecalciferol (VITAMIN D3) 50 MCG ( UT) CAPS       latanoprost (XALATAN) 0.005 % ophthalmic solution       nystatin (MYCOSTATIN) 932653 UNIT/GM ointment Apply topically 2 times daily. 30 g 1    nystatin (MYCOSTATIN) 455899 UNIT/GM powder Apply 3 times daily.  60 g 0    insulin glargine (LANTUS SOLOSTAR) 100 UNIT/ML injection pen 48 units daily 15 mL 2    TRUE METRIX BLOOD GLUCOSE TEST strip TEST BLOOD SUGAR DAILY AS DIRECTED 100 each 3    losartan (COZAAR) 100 MG tablet TAKE ONE (1) TABLET BY MOUTH EVERY DAY 90 tablet 3    ferrous gluconate (FERGON) 324 (38 Fe) MG tablet TAKE ONE (1) TABLET BY MOUTH TWO (2) TIMES DAILY 60 tablet 3    levothyroxine (SYNTHROID) 100 MCG tablet TAKE ONE TABLET BY MOUTH ONCE DAILY 30 tablet 3    loratadine (CLARITIN) 10 MG tablet TAKE ONE TABLET BY MOUTH ONCE DAILY 30 tablet 11    FLOVENT  MCG/ACT inhaler INHALE ONE (1) PUFF INTO THE LUNGS TWO (2) TIMES DAILY 12 g 5    ibuprofen (ADVIL;MOTRIN) 800 MG tablet TAKE ONE (1) TABLET BY MOUTH TWO (2) TIMES DAILY AS NEEDED FOR PAIN 60 tablet 5    omeprazole (PRILOSEC) 40 MG delayed release capsule TAKE ONE CAPSULE BY MOUTH TWICE A DAY 60 capsule 2    metFORMIN (GLUCOPHAGE-XR) 500 MG extended release tablet TAKE ONE (1) TABLET TWICE DAILY BY MOUTH 60 tablet 5    ezetimibe (ZETIA) 10 MG tablet TAKE ONE (1) TABLET BY MOUTH DAILY 30 tablet 5    ascorbic acid (VITAMIN C) 500 MG tablet TAKE ONE (1) TABLET BY MOUTH TWO (2) TIMES DAILY 30 tablet 3    fluticasone-salmeterol (ADVAIR HFA) 115-21 MCG/ACT inhaler INHALE ONE (1) PUFF INTO THE LUNGS TWO (2) TIMES DAILY 12 g 5    albuterol sulfate  (90 Base) MCG/ACT inhaler Inhale 2 puffs into the lungs every 4 hours as needed for Wheezing 1 each 5    rOPINIRole (REQUIP) 2 MG tablet TAKE ONE TABLET BY MOUTH EVERY NIGHT AT BEDTIME AND IN THE EVENING AS NEEDED 60 tablet 5    Lurasidone HCl (LATUDA PO) Take by mouth      Insulin Pen Needle (TECHLITE PEN NEEDLES) 32G X 4 MM MISC USE WITH INSULIN ONCE DAILY 100 each 3    pravastatin (PRAVACHOL) 40 MG tablet TAKE ONE TABLET BY MOUTH EACH EVENING 30 tablet 5    ferrous gluconate (FERGON) 240 (27 Fe) MG tablet Take 1 tablet by mouth 3 times daily (with meals) 90 tablet 0    atorvastatin (LIPITOR) 40 MG tablet TAKE ONE (1) TABLET BY MOUTH DAILY  90 tablet 1    glucose monitoring kit (FREESTYLE) monitoring kit 1 kit by Does not apply route daily 1 kit 0    FreeStyle Lancets MISC 1 each by Does not apply route daily 100 each 3    methocarbamol (ROBAXIN) 750 MG tablet       fluorometholone (FML) 0.1 % ophthalmic suspension       fluticasone (FLONASE) 50 MCG/ACT nasal spray 2 sprays by Each Nostril route daily 1 Bottle 5    azelastine (ASTELIN) 0.1 % nasal spray 2 sprays by Nasal route 2 times daily Use in each nostril as directed 2 Bottle 5    ipratropium-albuterol (DUONEB) 0.5-2.5 (3) MG/3ML SOLN nebulizer solution INHALE THREE (3) MLS INTO THE LUNGS EVERY FOUR (4) HOURS AS NEEDED FOR SHORTNESS OF BREATH  360 mL 5    FREESTYLE LITE strip USE DAILY AS NEEDED  50 each 5    B Complex Vitamins (VITAMIN B COMPLEX PO) Take by mouth      Ascorbic Acid (KARTIK-C PO) Take by mouth      estradiol (ESTRACE) 1 MG tablet       HYDROcodone-acetaminophen (NORCO) 5-325 MG per tablet       venlafaxine (EFFEXOR XR) 150 MG extended release capsule       venlafaxine (EFFEXOR-XR) 75 MG XR capsule   Take 150 mg by mouth See Admin Instructions 150mg in morning, 75mg at night      lamotrigine (LAMICTAL) 100 MG tablet Take 100 mg by mouth 2 times daily.  timolol (BETIMOL) 0.5 % ophthalmic solution 1 drop 2 times daily.  albuterol (PROVENTIL) (2.5 MG/3ML) 0.083% nebulizer solution Take 3 mLs by nebulization every 4 hours as needed for Wheezing 375 mL 5     No current facility-administered medications for this visit. Patient's past medical history, surgical history, family history, medications,  andallergies  were all reviewed and updated as appropriate today. Review of Systems   Constitutional: Negative for chills and fever. Respiratory: Negative for shortness of breath and wheezing. Cardiovascular: Negative for chest pain and palpitations. Skin: Positive for rash (redness under abdominal fold). Physical Exam  Vitals and nursing note reviewed. Constitutional:       Appearance: Normal appearance. She is well-developed. She is obese. HENT:      Head: Normocephalic and atraumatic. Right Ear: External ear normal.      Left Ear: External ear normal.      Nose: Nose normal.      Mouth/Throat:      Pharynx: No oropharyngeal exudate or posterior oropharyngeal erythema. Eyes:      Conjunctiva/sclera: Conjunctivae normal.   Cardiovascular:      Rate and Rhythm: Normal rate and regular rhythm. Heart sounds: Normal heart sounds. No murmur heard. Pulmonary:      Effort: Pulmonary effort is normal. No respiratory distress. Breath sounds: Normal breath sounds. No wheezing or rales. Abdominal:      Palpations: Abdomen is soft. There is no mass. Tenderness: There is no abdominal tenderness. Musculoskeletal:         General: Normal range of motion. Cervical back: Normal range of motion and neck supple.    Lymphadenopathy:      Cervical: No cervical adenopathy. Skin:     General: Skin is warm and dry. Findings: Rash (yeast rash under abdominal fold- at  scar) present. Neurological:      General: No focal deficit present. Mental Status: She is alert and oriented to person, place, and time. Deep Tendon Reflexes: Reflexes are normal and symmetric. Psychiatric:         Mood and Affect: Mood normal.         Behavior: Behavior normal.         Thought Content: Thought content normal.         Judgment: Judgment normal.       Vitals:    22 1345   BP: (!) 140/60   Pulse: 93   SpO2: 94%       Assessment:  Encounter Diagnosis   Name Primary?  Skin yeast infection Yes       Plan:  1. Skin yeast infection  Discussed ways to keep area dry and control blood sugars     - nystatin (MYCOSTATIN) 643668 UNIT/GM ointment; Apply topically 2 times daily. Dispense: 30 g; Refill: 1  - nystatin (MYCOSTATIN) 611605 UNIT/GM powder; Apply 3 times daily. Dispense: 60 g; Refill: 0        No follow-ups on file.

## 2022-07-11 RX ORDER — OMEPRAZOLE 40 MG/1
CAPSULE, DELAYED RELEASE ORAL
Qty: 60 CAPSULE | Refills: 2 | Status: SHIPPED | OUTPATIENT
Start: 2022-07-11 | End: 2022-09-09

## 2022-07-11 NOTE — TELEPHONE ENCOUNTER
Last Office Visit  -  7/5/22  Next Office Visit  -  8/11/22    Last Filled  -    Last UDS -    Contract -

## 2022-07-25 RX ORDER — LISINOPRIL 5 MG/1
TABLET ORAL
Qty: 90 TABLET | Refills: 1 | Status: SHIPPED | OUTPATIENT
Start: 2022-07-25

## 2022-07-25 NOTE — TELEPHONE ENCOUNTER
Medication:   Requested Prescriptions     Pending Prescriptions Disp Refills    lisinopril (PRINIVIL;ZESTRIL) 5 MG tablet [Pharmacy Med Name: LISINOPRIL 5MG TABLET] 90 tablet 1     Sig: TAKE ONE (1) TABLET BY MOUTH DAILY     Last Filled:     Last appt: 11/24/2021   Next appt: Visit date not found    Last Lipid:   Lab Results   Component Value Date/Time    CHOL 139 01/26/2021 11:29 AM    TRIG 201 01/26/2021 11:29 AM    HDL 75 08/23/2021 09:19 AM    HDL 62 12/27/2010 12:59 PM    LDLCALC 88 08/23/2021 09:19 AM

## 2022-08-04 ENCOUNTER — TELEPHONE (OUTPATIENT)
Dept: FAMILY MEDICINE CLINIC | Age: 62
End: 2022-08-04

## 2022-08-04 NOTE — TELEPHONE ENCOUNTER
----- Message from Alis Willingham sent at 8/4/2022  1:42 PM EDT -----  Subject: Message to Provider    QUESTIONS  Information for Provider? PT called in asking for her provider to fax   another referral for her back to this number [phone]. Please call her   to discuss. ---------------------------------------------------------------------------  --------------  Freida WATKINS  [phone]; OK to leave message on voicemail  ---------------------------------------------------------------------------  --------------  SCRIPT ANSWERS  Relationship to Patient?  Self

## 2022-08-11 ENCOUNTER — OFFICE VISIT (OUTPATIENT)
Dept: FAMILY MEDICINE CLINIC | Age: 62
End: 2022-08-11
Payer: COMMERCIAL

## 2022-08-11 VITALS
SYSTOLIC BLOOD PRESSURE: 134 MMHG | HEIGHT: 60 IN | HEART RATE: 87 BPM | DIASTOLIC BLOOD PRESSURE: 62 MMHG | BODY MASS INDEX: 33.96 KG/M2 | OXYGEN SATURATION: 98 % | WEIGHT: 173 LBS

## 2022-08-11 DIAGNOSIS — D50.9 IRON DEFICIENCY ANEMIA, UNSPECIFIED IRON DEFICIENCY ANEMIA TYPE: ICD-10-CM

## 2022-08-11 DIAGNOSIS — E11.00 UNCONTROLLED TYPE 2 DIABETES MELLITUS WITH HYPEROSMOLARITY WITHOUT COMA, WITHOUT LONG-TERM CURRENT USE OF INSULIN (HCC): ICD-10-CM

## 2022-08-11 DIAGNOSIS — E03.9 HYPOTHYROIDISM, UNSPECIFIED TYPE: ICD-10-CM

## 2022-08-11 DIAGNOSIS — E11.00 UNCONTROLLED TYPE 2 DIABETES MELLITUS WITH HYPEROSMOLARITY WITHOUT COMA, WITHOUT LONG-TERM CURRENT USE OF INSULIN (HCC): Primary | ICD-10-CM

## 2022-08-11 LAB
BASOPHILS ABSOLUTE: 0.1 K/UL (ref 0–0.2)
BASOPHILS RELATIVE PERCENT: 0.8 %
CHOLESTEROL, FASTING: 230 MG/DL (ref 0–199)
CREATININE URINE: 29.9 MG/DL (ref 28–259)
EOSINOPHILS ABSOLUTE: 0.5 K/UL (ref 0–0.6)
EOSINOPHILS RELATIVE PERCENT: 5 %
HBA1C MFR BLD: 7.8 %
HCT VFR BLD CALC: 36.9 % (ref 36–48)
HDLC SERPL-MCNC: 70 MG/DL (ref 40–60)
HEMOGLOBIN: 12.9 G/DL (ref 12–16)
LDL CHOLESTEROL CALCULATED: 126 MG/DL
LYMPHOCYTES ABSOLUTE: 2.7 K/UL (ref 1–5.1)
LYMPHOCYTES RELATIVE PERCENT: 28.1 %
MCH RBC QN AUTO: 30 PG (ref 26–34)
MCHC RBC AUTO-ENTMCNC: 35 G/DL (ref 31–36)
MCV RBC AUTO: 85.7 FL (ref 80–100)
MICROALBUMIN UR-MCNC: 3.6 MG/DL
MICROALBUMIN/CREAT UR-RTO: 120.4 MG/G (ref 0–30)
MONOCYTES ABSOLUTE: 0.5 K/UL (ref 0–1.3)
MONOCYTES RELATIVE PERCENT: 5.6 %
NEUTROPHILS ABSOLUTE: 5.8 K/UL (ref 1.7–7.7)
NEUTROPHILS RELATIVE PERCENT: 60.5 %
PDW BLD-RTO: 13.3 % (ref 12.4–15.4)
PLATELET # BLD: 393 K/UL (ref 135–450)
PMV BLD AUTO: 7.2 FL (ref 5–10.5)
RBC # BLD: 4.31 M/UL (ref 4–5.2)
TRIGLYCERIDE, FASTING: 168 MG/DL (ref 0–150)
TSH REFLEX: 0.74 UIU/ML (ref 0.27–4.2)
VLDLC SERPL CALC-MCNC: 34 MG/DL
WBC # BLD: 9.5 K/UL (ref 4–11)

## 2022-08-11 PROCEDURE — 2022F DILAT RTA XM EVC RTNOPTHY: CPT | Performed by: FAMILY MEDICINE

## 2022-08-11 PROCEDURE — 4004F PT TOBACCO SCREEN RCVD TLK: CPT | Performed by: FAMILY MEDICINE

## 2022-08-11 PROCEDURE — 83036 HEMOGLOBIN GLYCOSYLATED A1C: CPT | Performed by: FAMILY MEDICINE

## 2022-08-11 PROCEDURE — G8417 CALC BMI ABV UP PARAM F/U: HCPCS | Performed by: FAMILY MEDICINE

## 2022-08-11 PROCEDURE — 3017F COLORECTAL CA SCREEN DOC REV: CPT | Performed by: FAMILY MEDICINE

## 2022-08-11 PROCEDURE — G8427 DOCREV CUR MEDS BY ELIG CLIN: HCPCS | Performed by: FAMILY MEDICINE

## 2022-08-11 PROCEDURE — 3051F HG A1C>EQUAL 7.0%<8.0%: CPT | Performed by: FAMILY MEDICINE

## 2022-08-11 PROCEDURE — 99214 OFFICE O/P EST MOD 30 MIN: CPT | Performed by: FAMILY MEDICINE

## 2022-08-21 NOTE — PROGRESS NOTES
Dara Hook (:  1960) is a 63 y.o. female,Established patient, here for evaluation of the following chief complaint(s):  3 Month Follow-Up, Diabetes, and Hypothyroidism         ASSESSMENT/PLAN:  1. Uncontrolled type 2 diabetes mellitus with hyperosmolarity without coma, without long-term current use of insulin (HCC)  -     POCT glycosylated hemoglobin (Hb A1C)  -     Microalbumin / Creatinine Urine Ratio; Future  -     Lipid, Fasting; Future  Lab Results   Component Value Date    LABA1C 7.8 2022     Lab Results   Component Value Date    .1 2021     Fair control at this time. Patient has agreed to work on diet. 2. Hypothyroidism, unspecified type  -     TSH with Reflex; Future  3. Iron deficiency anemia, unspecified iron deficiency anemia type  -     CBC with Auto Differential; Future      No follow-ups on file. Subjective   SUBJECTIVE/OBJECTIVE:  Dara Hook is a 63 y.o. female. Patient presents with:  3 Month Follow-Up  Diabetes  Hypothyroidism      Is a 82-year-old female who presents for follow-up of the following conditions    1. Diabetes mellitus. Patient has been taking her medications regularly denies any significant side effects on her medication. Denies any significant polyuria polyphagia or polydipsia. No significant numbness or tingling in the extremities. No significant visual changes. 2.  Patient is hypothyroid. Patient has been taking medication for this. Denies any significant weight loss or weight gain. No hot or cold intolerance. 3.  On  Patient has a history of iron deficiency anemia. Patient has been on supplements in the past.  She has been feeling fine but would like this rechecked. Denies any significant other issues with the medication. The patients PMH, surgical history, family history, medications, allergies were all reviewed and updated as appropriate today.       Diabetes      Review of Systems       Objective   Physical Exam  Vitals and nursing note reviewed. Constitutional:       Appearance: Normal appearance. She is well-developed. HENT:      Head: Normocephalic and atraumatic. Right Ear: External ear normal.      Left Ear: External ear normal.      Nose: Nose normal.   Eyes:      Conjunctiva/sclera: Conjunctivae normal.      Pupils: Pupils are equal, round, and reactive to light. Cardiovascular:      Rate and Rhythm: Normal rate and regular rhythm. Heart sounds: Normal heart sounds. Pulmonary:      Effort: Pulmonary effort is normal.      Breath sounds: Normal breath sounds. Abdominal:      General: Bowel sounds are normal.      Palpations: Abdomen is soft. Musculoskeletal:         General: Normal range of motion. Cervical back: Normal range of motion and neck supple. Skin:     General: Skin is dry. Neurological:      Mental Status: She is alert and oriented to person, place, and time. Deep Tendon Reflexes: Reflexes are normal and symmetric. Psychiatric:         Behavior: Behavior normal.         Thought Content: Thought content normal.         Judgment: Judgment normal.            An electronic signature was used to authenticate this note.     --aWrren Palumbo MD

## 2022-08-22 RX ORDER — ROPINIROLE 2 MG/1
TABLET, FILM COATED ORAL
Qty: 60 TABLET | Refills: 0 | Status: SHIPPED | OUTPATIENT
Start: 2022-08-22 | End: 2022-09-06 | Stop reason: SDUPTHER

## 2022-08-25 ENCOUNTER — HOSPITAL ENCOUNTER (OUTPATIENT)
Dept: CT IMAGING | Age: 62
Discharge: HOME | End: 2022-08-25
Payer: COMMERCIAL

## 2022-08-25 ENCOUNTER — TELEPHONE (OUTPATIENT)
Dept: PULMONOLOGY | Age: 62
End: 2022-08-25

## 2022-08-25 DIAGNOSIS — Z87.891 PERSONAL HISTORY OF TOBACCO USE: Primary | ICD-10-CM

## 2022-08-25 DIAGNOSIS — Z87.891 PERSONAL HISTORY OF TOBACCO USE: ICD-10-CM

## 2022-08-25 PROCEDURE — 71271 CT THORAX LUNG CANCER SCR C-: CPT

## 2022-08-30 DIAGNOSIS — E03.9 HYPOTHYROIDISM, UNSPECIFIED TYPE: ICD-10-CM

## 2022-08-30 RX ORDER — LEVOTHYROXINE SODIUM 0.1 MG/1
TABLET ORAL
Qty: 30 TABLET | Refills: 3 | Status: SHIPPED | OUTPATIENT
Start: 2022-08-30

## 2022-08-30 NOTE — TELEPHONE ENCOUNTER
Last Office Visit  -  8/23/22  Next Office Visit  -  9/1/22    Last Filled  -  5/9/22  Last UDS -    Contract -

## 2022-09-06 ENCOUNTER — OFFICE VISIT (OUTPATIENT)
Dept: PULMONOLOGY | Age: 62
End: 2022-09-06
Payer: COMMERCIAL

## 2022-09-06 VITALS
BODY MASS INDEX: 33.57 KG/M2 | OXYGEN SATURATION: 98 % | DIASTOLIC BLOOD PRESSURE: 68 MMHG | HEART RATE: 87 BPM | WEIGHT: 171 LBS | HEIGHT: 60 IN | SYSTOLIC BLOOD PRESSURE: 138 MMHG

## 2022-09-06 DIAGNOSIS — Z87.891 PERSONAL HISTORY OF TOBACCO USE: ICD-10-CM

## 2022-09-06 DIAGNOSIS — J44.9 CHRONIC OBSTRUCTIVE PULMONARY DISEASE, UNSPECIFIED COPD TYPE (HCC): Primary | ICD-10-CM

## 2022-09-06 DIAGNOSIS — G25.81 RLS (RESTLESS LEGS SYNDROME): ICD-10-CM

## 2022-09-06 PROCEDURE — 99214 OFFICE O/P EST MOD 30 MIN: CPT | Performed by: INTERNAL MEDICINE

## 2022-09-06 PROCEDURE — 3017F COLORECTAL CA SCREEN DOC REV: CPT | Performed by: INTERNAL MEDICINE

## 2022-09-06 PROCEDURE — 4004F PT TOBACCO SCREEN RCVD TLK: CPT | Performed by: INTERNAL MEDICINE

## 2022-09-06 PROCEDURE — G8417 CALC BMI ABV UP PARAM F/U: HCPCS | Performed by: INTERNAL MEDICINE

## 2022-09-06 PROCEDURE — G8427 DOCREV CUR MEDS BY ELIG CLIN: HCPCS | Performed by: INTERNAL MEDICINE

## 2022-09-06 PROCEDURE — 3023F SPIROM DOC REV: CPT | Performed by: INTERNAL MEDICINE

## 2022-09-06 RX ORDER — ROPINIROLE 2 MG/1
TABLET, FILM COATED ORAL
Qty: 60 TABLET | Refills: 5 | Status: SHIPPED | OUTPATIENT
Start: 2022-09-06

## 2022-09-06 NOTE — PROGRESS NOTES
CHIEF COMPLAINT:  Restless legs syndrome     HPI:   Interval history:   COPD:   Tried vaping as bridge to quit, did not like the vaping products. Cough is significant. Smoking about 1 ppd. RLS: uses Requip with benefit, 2 mg morning, 2 mg evening. Leg sx don't usually start until after morning though. Dose not changed. No compulsive behavior. No sleep attacks. Legs occasionally still give trouble, but she is managing. She feels she is doing well. Does drive but never when sleepy. Presenting history:   The patient is a 49 yo woman with hx of tobacco abuse, brain aneurysm, DM presents with chronic dyspnea on exertion. Patient complains of shortness of breath. Symptoms include dyspnea on exertion. Symptoms began a few years ago, gradually worsening since that time. The dyspnea occurs with moderate activity. Patient denies hemoptysis . Aggravating factors include exertion, exercise and climbing stairs. The patient reports an exercise tolerance of approximately 3 blocks on the flat and 1 flight of stairs, limited primarily by dyspnea and leg pain. She smokes 1 ppd x 32 years. She has used spiriva and advair in the past without benefit. Currently using symbicort and albuterol with some response. Initially followed by Dr. Flo Gutierrez. reports that she has been smoking cigarettes. She started smoking about 44 years ago. She has a 58.50 pack-year smoking history. She has never used smokeless tobacco.    Objective:   Blood pressure 138/68, pulse 87, height 5' (1.524 m), weight 171 lb (77.6 kg), SpO2 98 %, not currently breastfeeding. Constitutional:  No acute distress. HENT:  Oropharynx is clear and moist.   Neck: No tracheal deviation present. Cardiovascular: Normal heart sounds. No lower extremity edema. Pulmonary/Chest: No wheezes. No rhonchi. No rales. No decreased breath sounds. No accessory muscle usage or stridor. Musculoskeletal: No cyanosis. No clubbing. Skin: Skin is warm and dry. Psychiatric: Normal mood and affect. Neurologic: speech fluent, alert and oriented, strength symmetric         PFTs 10/5/12  FVC 2.72(88%) FEV1 2.12 (86%) FEV1/FVC ratio  78% TLC 3.82 (83%) DLCO 14.71 (68%)  No BD response  PFT 4/29/14 FEV1 2.07 L 86% TLC 3.69 L (80%) DLCO 13.76 64%  PFT 7/19/18 FEV1 2.03 L 88% TLC 3.91 L 85%   DLCO 12.22 58%    6MWT 1120 feet, low sat 95%    IMAGING:    LDCT 8/25/22  There is a tiny 1-2 mm nodule in the left upper lobe, stable. No new or   suspicious nodule otherwise noted. Potentially Significant Incidentals (Lung-RADS Category S): None       Pulmonary incidentals:  Calcified subcarinal lymph nodes representing   granulomatous change. There is also a granuloma in the left lower lobe. Echo 2015  - Left ventricle: The cavity size was normal. Wall thickness  was normal. Systolic function was normal. The estimated  ejection fraction was in the range of 60% to 65%. Wall  motion was normal; there were no regional wall motion  abnormalities. Left ventricular diastolic function  parameters were normal. Pulmonary arteries: PA peak  pressure: 37mm Hg (S). ASSESSMENT AND PLAN:  Pulmonary Centrilobular Emphysema    Advair 115/21 BID (abdominal cramps not better off salmeterol), albuterol MDI and duonebs; intolerant of Spiriva due to sensation of swelling in throat. Restless legs syndrome:   Requip 2 mg at bedtime and up to 2 mg earlier in day. She is doing well and is stable   Depression: treated at [facility], has been long term treated with Venlafaxine   Tobacco abuse -  discussed importance of quitting    Greater than 44 pack year history. Pre-contemplative  H/O vocal cord polyp.   F/B Dr. Flex Chin    LDCT for LCS Aug 2023, see me after  See me in 6 months as well with full PFT, f/u RLS      Screening CT scan was considered in a lung cancer screening counseling and shared decision making visit today that included the following elements:   Eligibility: Age: 61. There are no signs or symptoms of lung cancer. Tobacco History 44 pack-years, quit zero years ago  Verbal counseling has been performed by me to include benefits and harms of screening, follow-up diagnostic testing, over-diagnosis, false positive rate, and total radiation exposure;   I have counseled on the importance of adherence to annual lung cancer LDCT screening, the impact of comorbidities and patient is willing to undergo diagnosis and treatment;   I have provided counseling on the importance of maintaining cigarette smoking abstinence if former smoker; or the importance of smoking cessation if current smoker and, if appropriate, furnishing of information about tobacco cessation interventions; and   I have furnished a written order for lung cancer screening with LDCT. Order for Screening chest CT scan should be placed with documentation as below:  Beneficiary date of birth;    Actual pack - year smoking history (number) from above;   Current smoking status, and for former smokers, the number of years since quitting smoking from above  Beneficiary is asymptomatic   National Provider Identifier (NPI) for Jassi Arguelles [id number]

## 2022-09-09 DIAGNOSIS — E78.5 DYSLIPIDEMIA: ICD-10-CM

## 2022-09-09 RX ORDER — EZETIMIBE 10 MG/1
TABLET ORAL
Qty: 30 TABLET | Refills: 5 | Status: SHIPPED | OUTPATIENT
Start: 2022-09-09

## 2022-09-09 RX ORDER — IBUPROFEN 800 MG/1
TABLET ORAL
Qty: 60 TABLET | Refills: 5 | Status: SHIPPED | OUTPATIENT
Start: 2022-09-09

## 2022-09-09 RX ORDER — OMEPRAZOLE 40 MG/1
CAPSULE, DELAYED RELEASE ORAL
Qty: 60 CAPSULE | Refills: 2 | Status: SHIPPED | OUTPATIENT
Start: 2022-09-09

## 2022-09-13 ENCOUNTER — OFFICE VISIT (OUTPATIENT)
Dept: ENDOCRINOLOGY | Age: 62
End: 2022-09-13
Payer: COMMERCIAL

## 2022-09-13 VITALS
SYSTOLIC BLOOD PRESSURE: 130 MMHG | HEART RATE: 90 BPM | OXYGEN SATURATION: 96 % | DIASTOLIC BLOOD PRESSURE: 68 MMHG | WEIGHT: 179.4 LBS | BODY MASS INDEX: 35.04 KG/M2

## 2022-09-13 DIAGNOSIS — E11.65 UNCONTROLLED TYPE 2 DIABETES MELLITUS WITH HYPERGLYCEMIA (HCC): Primary | ICD-10-CM

## 2022-09-13 PROCEDURE — 4004F PT TOBACCO SCREEN RCVD TLK: CPT | Performed by: INTERNAL MEDICINE

## 2022-09-13 PROCEDURE — 2022F DILAT RTA XM EVC RTNOPTHY: CPT | Performed by: INTERNAL MEDICINE

## 2022-09-13 PROCEDURE — 3017F COLORECTAL CA SCREEN DOC REV: CPT | Performed by: INTERNAL MEDICINE

## 2022-09-13 PROCEDURE — 3051F HG A1C>EQUAL 7.0%<8.0%: CPT | Performed by: INTERNAL MEDICINE

## 2022-09-13 PROCEDURE — G8427 DOCREV CUR MEDS BY ELIG CLIN: HCPCS | Performed by: INTERNAL MEDICINE

## 2022-09-13 PROCEDURE — G8417 CALC BMI ABV UP PARAM F/U: HCPCS | Performed by: INTERNAL MEDICINE

## 2022-09-13 PROCEDURE — 99214 OFFICE O/P EST MOD 30 MIN: CPT | Performed by: INTERNAL MEDICINE

## 2022-09-13 NOTE — PROGRESS NOTES
Seen as patient for diabetes    Has seen Norman Tillman    Interim:    No issues    Stopped trulicity due to diarrhea    Diagnosed with Type 2 diabetes mellitus in    Known diabetic complications: none   Uncontrolled, moderate    Current diabetic medications     Lantus 48 units  Metformin 500mg BID    States has not been on any other medication    Last A1c 7.8%<---- 8.2%<-----  7.4%<-----7.8%<---- 10.8%<----- 9.5%<--- 11.9% <---- 7.1%    Prior visit with dietician: no  Current diet: on average, 3 meals per day  Drinks regular soda cut down from 6 to 3/day  Current exercise: walking   Current monitoring regimen: home blood tests -1  times daily     Has brought blood glucose log/meter: yes  Home blood sugar records: 123-186  Any episodes of hypoglycemia? ----  Worsened by high CHO    No Hx of CAD , PVD, CVA    Hyperlipidemia:   For   Years  Takes lipitor 40mg zetia  Controlled      Last eye exam:   Last foot exam:   Last microalbumin to creatinine ratio: 213 on ---> 120 on   Lisinopril 5mg    She is on levothyroxine, stable    She is Moni's aunt    Past Medical History:   Diagnosis Date    Anxiety     Bipolar disorder ([facility] [address]     Brain aneurysm     [place]    Closed angle glaucoma     COPD (chronic obstructive pulmonary disease) ([facility] [address]     Hyperlipidemia     Hypertension     Hypothyroidism     Migraines     Open-angle glaucoma of both eyes     RLS (restless legs syndrome)     Rotator cuff disorder     right shoulder    TIA (transient ischemic attack) 11    Tobacco use disorder     Type II or unspecified type diabetes mellitus without mention of complication, not stated as uncontrolled     diet controlled     Past Surgical History:   Procedure Laterality Date    BLADDER SUSPENSION      CARPAL TUNNEL RELEASE Bilateral     right and left     SECTION      x 2    CHOLECYSTECTOMY      COLONOSCOPY      COLONOSCOPY  2016    diverticulosis    CYST REMOVAL      hidradenitis?     EYE SURGERY Right 1/25/16    cataract    EYE SURGERY Left 2/2/16    cataract removal    FOOT SURGERY  2018    GLAUCOMA SURGERY Bilateral     twice in right eye    HYSTERECTOMY (CERVIX STATUS UNKNOWN)  2002/2015    total    KNEE SURGERY Right 11/30/2018    OTHER SURGICAL HISTORY  09/2015    left oopherectomy    OTHER SURGICAL HISTORY  04/10/2019    CYSTOSCOPY, TRANSVAGINAL TAPING, CYSTOCELE REPAIR     IN LAP,SLING OPERATION N/A 4/10/2019    CYSTOSCOPY, TRANSVAGINAL TAPING, CYSTOCELE REPAIR performed by Marlon Weiss MD at [facility] Right 2008 and Brinda 1 2013    x2    THROAT SURGERY  06/2018    mass revmoved vocal cord     THROAT SURGERY      UPPER GASTROINTESTINAL ENDOSCOPY  09/12/2016    small bowel bx    URETHRA SURGERY  06/28/2018     \  Current Outpatient Medications   Medication Sig Dispense Refill    ibuprofen (ADVIL;MOTRIN) 800 MG tablet TAKE ONE (1) TABLET BY MOUTH TWO (2) TIMES DAILY AS NEEDED FOR PAIN 60 tablet 5    ezetimibe (ZETIA) 10 MG tablet TAKE ONE (1) TABLET BY MOUTH DAILY 30 tablet 5    omeprazole (PRILOSEC) 40 MG delayed release capsule TAKE ONE CAPSULE BY MOUTH TWICE A DAY 60 capsule 2    fluticasone-salmeterol (ADVAIR HFA) 115-21 MCG/ACT inhaler INHALE 2 PUFFS INTO THE LUNGS TWO (2) TIMES DAILY 12 g 5    rOPINIRole (REQUIP) 2 MG tablet 1 tab twice daily 60 tablet 5    levothyroxine (SYNTHROID) 100 MCG tablet TAKE ONE TABLET BY MOUTH ONCE DAILY 30 tablet 3    lisinopril (PRINIVIL;ZESTRIL) 5 MG tablet TAKE ONE (1) TABLET BY MOUTH DAILY 90 tablet 1    Cholecalciferol (VITAMIN D3) 50 MCG (2000 UT) CAPS       latanoprost (XALATAN) 0.005 % ophthalmic solution       insulin glargine (LANTUS SOLOSTAR) 100 UNIT/ML injection pen 48 units daily 15 mL 2    TRUE METRIX BLOOD GLUCOSE TEST strip TEST BLOOD SUGAR DAILY AS DIRECTED 100 each 3    losartan (COZAAR) 100 MG tablet TAKE ONE (1) TABLET BY MOUTH EVERY DAY 90 tablet 3    loratadine (CLARITIN) 10 MG tablet TAKE ONE TABLET BY MOUTH ONCE DAILY 30 tablet 11    metFORMIN (GLUCOPHAGE-XR) 500 MG extended release tablet TAKE ONE (1) TABLET TWICE DAILY BY MOUTH 60 tablet 5    albuterol sulfate  (90 Base) MCG/ACT inhaler Inhale 2 puffs into the lungs every 4 hours as needed for Wheezing 1 each 5    Lurasidone HCl (LATUDA PO) Take by mouth      Insulin Pen Needle (TECHLITE PEN NEEDLES) 32G X 4 MM MISC USE WITH INSULIN ONCE DAILY 100 each 3    pravastatin (PRAVACHOL) 40 MG tablet TAKE ONE TABLET BY MOUTH EACH EVENING 30 tablet 5    ferrous gluconate (FERGON) 240 (27 Fe) MG tablet Take 1 tablet by mouth 3 times daily (with meals) 90 tablet 0    atorvastatin (LIPITOR) 40 MG tablet TAKE ONE (1) TABLET BY MOUTH DAILY  90 tablet 1    FreeStyle Lancets MISC 1 each by Does not apply route daily 100 each 3    methocarbamol (ROBAXIN) 750 MG tablet       fluticasone (FLONASE) 50 MCG/ACT nasal spray 2 sprays by Each Nostril route daily 1 Bottle 5    azelastine (ASTELIN) 0.1 % nasal spray 2 sprays by Nasal route 2 times daily Use in each nostril as directed 2 Bottle 5    B Complex Vitamins (VITAMIN B COMPLEX PO) Take by mouth      Ascorbic Acid (KARTIK-C PO) Take by mouth      estradiol (ESTRACE) 1 MG tablet       venlafaxine (EFFEXOR XR) 150 MG extended release capsule       venlafaxine (EFFEXOR-XR) 75 MG XR capsule   Take 150 mg by mouth See Admin Instructions 150mg in morning, 75mg at night      lamoTRIgine (LAMICTAL) 100 MG tablet Take 100 mg by mouth 2 times daily. timolol (BETIMOL) 0.5 % ophthalmic solution 1 drop 2 times daily.       ferrous gluconate (FERGON) 324 (38 Fe) MG tablet TAKE ONE (1) TABLET BY MOUTH TWO (2) TIMES DAILY 60 tablet 3    ascorbic acid (VITAMIN C) 500 MG tablet TAKE ONE (1) TABLET BY MOUTH TWO (2) TIMES DAILY 30 tablet 3    glucose monitoring kit (FREESTYLE) monitoring kit 1 kit by Does not apply route daily 1 kit 0    fluorometholone (FML) 0.1 % ophthalmic suspension       albuterol (PROVENTIL) (2.5 MG/3ML) 0.083% nebulizer solution Take 3 mLs by nebulization every 4 hours as needed for Wheezing 375 mL 5     No current facility-administered medications for this visit. Review of Systems  Please see scanned document dated and signed      Objective:      /68   Pulse 90   Wt 179 lb 6.4 oz (81.4 kg)   LMP  (LMP Unknown)   SpO2 96%   BMI 35.04 kg/m²   Wt Readings from Last 3 Encounters:   09/13/22 179 lb 6.4 oz (81.4 kg)   09/06/22 171 lb (77.6 kg)   08/11/22 173 lb (78.5 kg)     Vitals:    09/13/22 1059   BP: 130/68   Pulse: 90   SpO2: 96%       Constitutional: Well-developed, appears stated age, cooperative, in no acute distress  H/E/N/M/T:atraumatic, normocephalic, external ears, nose, lips normal without lesions  Eyes: Lids, lashes, conjunctivae and sclerae normal, No proptosis, no redness  Neck: supple, symmetrical, no swelling  Skin: No obvious rashes or lesions present. Skin and hair texture normal  Psychiatric: Judgement and Insight:  judgement and insight appear normal  Neuro: Normal without focal findings, speech is normal normal, speech is spontaneous  Chest: No labored breathing, no chest deformity, no stridor  Musculoskeletal: No joint deformity, swelling    Lab Reviewed   No components found for: CHLPL  Lab Results   Component Value Date    TRIG 201 (H) 01/26/2021    TRIG 869 (H) 09/21/2020    TRIG 352 (H) 05/12/2017     Lab Results   Component Value Date    HDL 70 (H) 08/11/2022    HDL 75 (H) 08/23/2021    HDL 58 01/26/2021     Lab Results   Component Value Date    LDLCALC 126 (H) 08/11/2022    LDLCALC 88 08/23/2021    LDLCALC 41 01/26/2021     Lab Results   Component Value Date    LABVLDL 34 08/11/2022    LABVLDL 37 08/23/2021    LABVLDL 40 01/26/2021     Lab Results   Component Value Date    LABA1C 7.8 08/11/2022       Assessment:     Emilio Alvarez is a 63 y.o. female with :    1.T2DM: Uncontrolled, A1c high.  Discussed goals, she cut down regular soda, still having 3/day, advised need to cut down.referred to dietician. Advised may need rapid acting insulin, started GLP-1 agonist but did not tolerate. A1c is better  Add SGLT-2 inhibitor  lantus 48 units  2. Hypothyroidism: TSH at goal  3. HLD: LDL at goal  4. Obesity       Plan:      Lantus 48 units, advised self titration   continue metformin   Add jardiance   Advise to check blood sugar 2 times a day   Patient to send blood sugar log for titration. Advise to low simple carbohydrate and protein with each  meal diet. Diabetes Care: recommend yearly eye exam, foot exam and urine microalbumin to   creatinine ratio. Patient is up-to-date.

## 2022-09-19 RX ORDER — FLUTICASONE PROPIONATE 220 UG/1
AEROSOL, METERED RESPIRATORY (INHALATION)
Qty: 12 G | Refills: 5 | OUTPATIENT
Start: 2022-09-19

## 2022-09-28 DIAGNOSIS — E11.00 UNCONTROLLED TYPE 2 DIABETES MELLITUS WITH HYPEROSMOLARITY WITHOUT COMA, WITHOUT LONG-TERM CURRENT USE OF INSULIN (HCC): ICD-10-CM

## 2022-09-28 RX ORDER — METFORMIN HYDROCHLORIDE 500 MG/1
TABLET, EXTENDED RELEASE ORAL
Qty: 60 TABLET | Refills: 5 | Status: SHIPPED | OUTPATIENT
Start: 2022-09-28

## 2022-10-04 ENCOUNTER — HOSPITAL ENCOUNTER (OUTPATIENT)
Age: 62
Discharge: HOME | End: 2022-10-04
Payer: COMMERCIAL

## 2022-10-04 LAB
A/G RATIO: 2 (ref 1.1–2.2)
ALBUMIN SERPL-MCNC: 4.7 G/DL (ref 3.4–5)
ALP BLD-CCNC: 97 U/L (ref 40–129)
ALT SERPL-CCNC: 20 U/L (ref 10–40)
ANION GAP SERPL CALCULATED.3IONS-SCNC: 15 MMOL/L (ref 3–16)
AST SERPL-CCNC: 13 U/L (ref 15–37)
BASOPHILS ABSOLUTE: 0.1 K/UL (ref 0–0.2)
BASOPHILS RELATIVE PERCENT: 1.2 %
BILIRUB SERPL-MCNC: <0.2 MG/DL (ref 0–1)
BUN BLDV-MCNC: 15 MG/DL (ref 7–20)
CALCIUM SERPL-MCNC: 10 MG/DL (ref 8.3–10.6)
CHLORIDE BLD-SCNC: 90 MMOL/L (ref 99–110)
CHOLESTEROL, TOTAL: 201 MG/DL (ref 0–199)
CO2: 21 MMOL/L (ref 21–32)
CREAT SERPL-MCNC: 0.8 MG/DL (ref 0.6–1.1)
EOSINOPHILS ABSOLUTE: 0.3 K/UL (ref 0–0.6)
EOSINOPHILS RELATIVE PERCENT: 2.9 %
GFR AFRICAN AMERICAN: >60
GFR NON-AFRICAN AMERICAN: >60
GLUCOSE BLD-MCNC: 202 MG/DL (ref 70–99)
HCT VFR BLD CALC: 35.8 % (ref 36–48)
HDLC SERPL-MCNC: 86 MG/DL (ref 40–60)
HEMOGLOBIN: 12.2 G/DL (ref 12–16)
LDL CHOLESTEROL CALCULATED: 84 MG/DL
LYMPHOCYTES ABSOLUTE: 3 K/UL (ref 1–5.1)
LYMPHOCYTES RELATIVE PERCENT: 29 %
MCH RBC QN AUTO: 29.4 PG (ref 26–34)
MCHC RBC AUTO-ENTMCNC: 34.2 G/DL (ref 31–36)
MCV RBC AUTO: 86 FL (ref 80–100)
MONOCYTES ABSOLUTE: 0.4 K/UL (ref 0–1.3)
MONOCYTES RELATIVE PERCENT: 4.3 %
NEUTROPHILS ABSOLUTE: 6.4 K/UL (ref 1.7–7.7)
NEUTROPHILS RELATIVE PERCENT: 62.6 %
PDW BLD-RTO: 14.2 % (ref 12.4–15.4)
PLATELET # BLD: 408 K/UL (ref 135–450)
PMV BLD AUTO: 7 FL (ref 5–10.5)
POTASSIUM SERPL-SCNC: 4.3 MMOL/L (ref 3.5–5.1)
RBC # BLD: 4.17 M/UL (ref 4–5.2)
SODIUM BLD-SCNC: 126 MMOL/L (ref 136–145)
TOTAL PROTEIN: 7.1 G/DL (ref 6.4–8.2)
TRIGL SERPL-MCNC: 153 MG/DL (ref 0–150)
VITAMIN D 25-HYDROXY: 39 NG/ML
VLDLC SERPL CALC-MCNC: 31 MG/DL
WBC # BLD: 10.2 K/UL (ref 4–11)

## 2022-10-04 PROCEDURE — 85025 COMPLETE CBC W/AUTO DIFF WBC: CPT

## 2022-10-04 PROCEDURE — 83036 HEMOGLOBIN GLYCOSYLATED A1C: CPT

## 2022-10-04 PROCEDURE — 80061 LIPID PANEL: CPT

## 2022-10-04 PROCEDURE — 82306 VITAMIN D 25 HYDROXY: CPT

## 2022-10-04 PROCEDURE — 80053 COMPREHEN METABOLIC PANEL: CPT

## 2022-10-05 LAB
ESTIMATED AVERAGE GLUCOSE: 162.8 MG/DL
HBA1C MFR BLD: 7.3 %

## 2022-10-08 DIAGNOSIS — D50.9 IRON DEFICIENCY ANEMIA, UNSPECIFIED IRON DEFICIENCY ANEMIA TYPE: ICD-10-CM

## 2022-10-10 RX ORDER — DOXYCYCLINE HYCLATE 50 MG/1
CAPSULE, GELATIN COATED ORAL
Qty: 60 TABLET | Refills: 3 | Status: SHIPPED | OUTPATIENT
Start: 2022-10-10

## 2022-10-14 DIAGNOSIS — E11.65 UNCONTROLLED TYPE 2 DIABETES MELLITUS WITH HYPERGLYCEMIA (HCC): ICD-10-CM

## 2022-10-14 RX ORDER — INSULIN GLARGINE 100 [IU]/ML
INJECTION, SOLUTION SUBCUTANEOUS
Qty: 15 ML | Refills: 5 | Status: SHIPPED | OUTPATIENT
Start: 2022-10-14

## 2022-10-14 NOTE — TELEPHONE ENCOUNTER
Medication:   Requested Prescriptions     Pending Prescriptions Disp Refills    insulin glargine (LANTUS SOLOSTAR) 100 UNIT/ML injection pen [Pharmacy Med Name: Fredy Gordon 100/ML SOLN PEN-INJ] 15 mL 5     Sig: INJECT 48 UNITS UNDER THE SKIN ONCE DAILY       Last Filled:      Patient Phone Number: [phone] (home)     Last appt: 9/13/2022   Next appt: 12/21/2022    Last Labs DM:   Lab Results   Component Value Date/Time    LABA1C 7.3 10/04/2022 09:05 AM

## 2022-10-17 RX ORDER — FLUTICASONE PROPIONATE 220 UG/1
AEROSOL, METERED RESPIRATORY (INHALATION)
Qty: 12 G | Refills: 5 | OUTPATIENT
Start: 2022-10-17

## 2022-11-22 ENCOUNTER — HOSPITAL ENCOUNTER (OUTPATIENT)
Dept: WOMENS IMAGING | Age: 62
Discharge: HOME | End: 2022-11-22
Payer: COMMERCIAL

## 2022-11-22 DIAGNOSIS — Z12.31 VISIT FOR SCREENING MAMMOGRAM: ICD-10-CM

## 2022-11-22 PROCEDURE — 77063 BREAST TOMOSYNTHESIS BI: CPT

## 2023-01-03 DIAGNOSIS — D50.9 IRON DEFICIENCY ANEMIA, UNSPECIFIED IRON DEFICIENCY ANEMIA TYPE: ICD-10-CM

## 2023-01-03 RX ORDER — DOXYCYCLINE HYCLATE 50 MG/1
CAPSULE, GELATIN COATED ORAL
Qty: 60 TABLET | Refills: 3 | Status: SHIPPED | OUTPATIENT
Start: 2023-01-03

## 2023-01-03 RX ORDER — OMEPRAZOLE 40 MG/1
CAPSULE, DELAYED RELEASE ORAL
Qty: 60 CAPSULE | Refills: 2 | Status: SHIPPED | OUTPATIENT
Start: 2023-01-03

## 2023-01-03 NOTE — TELEPHONE ENCOUNTER
Last Office Visit  -  8/11/22  Next Office Visit  -  n/a    Last Filled  -    Last UDS -    Contract -

## 2023-01-03 NOTE — TELEPHONE ENCOUNTER
Last Office Visit  -  08/11/2022  Next Office Visit  -  n/a    Last Filled  -    Last UDS -    Contract -

## 2023-01-16 DIAGNOSIS — E11.00 UNCONTROLLED TYPE 2 DIABETES MELLITUS WITH HYPEROSMOLARITY WITHOUT COMA, WITHOUT LONG-TERM CURRENT USE OF INSULIN (HCC): Primary | ICD-10-CM

## 2023-01-16 RX ORDER — LISINOPRIL 5 MG/1
TABLET ORAL
Qty: 90 TABLET | Refills: 1 | Status: SHIPPED | OUTPATIENT
Start: 2023-01-16

## 2023-01-16 NOTE — TELEPHONE ENCOUNTER
Medication:   Requested Prescriptions     Pending Prescriptions Disp Refills    lisinopril (PRINIVIL;ZESTRIL) 5 MG tablet [Pharmacy Med Name: LISINOPRIL 5MG TABLET] 90 tablet 1     Sig: TAKE ONE (1) TABLET BY MOUTH DAILY        Last Filled:      Patient Phone Number: [phone] (home)     Last appt: 9/13/2022   Next appt: 4/4/2023    Last OARRS:   RX Monitoring 4/10/2019   Attestation -   Acute Pain Prescriptions Severe pain not adequately treated with lower dose.   Periodic Controlled Substance Monitoring -

## 2023-01-17 ENCOUNTER — TELEPHONE (OUTPATIENT)
Dept: ENDOCRINOLOGY | Age: 63
End: 2023-01-17

## 2023-01-17 ENCOUNTER — TELEPHONE (OUTPATIENT)
Dept: FAMILY MEDICINE CLINIC | Age: 63
End: 2023-01-17

## 2023-01-17 ENCOUNTER — OFFICE VISIT (OUTPATIENT)
Dept: FAMILY MEDICINE CLINIC | Age: 63
End: 2023-01-17

## 2023-01-17 VITALS
WEIGHT: 169 LBS | BODY MASS INDEX: 33.18 KG/M2 | HEART RATE: 99 BPM | HEIGHT: 60 IN | SYSTOLIC BLOOD PRESSURE: 128 MMHG | DIASTOLIC BLOOD PRESSURE: 60 MMHG | OXYGEN SATURATION: 96 %

## 2023-01-17 DIAGNOSIS — E11.00 UNCONTROLLED TYPE 2 DIABETES MELLITUS WITH HYPEROSMOLARITY WITHOUT COMA, WITHOUT LONG-TERM CURRENT USE OF INSULIN (HCC): ICD-10-CM

## 2023-01-17 DIAGNOSIS — L02.419 ABSCESS, AXILLA: Primary | ICD-10-CM

## 2023-01-17 RX ORDER — ACETAMINOPHEN AND CODEINE PHOSPHATE 300; 30 MG/1; MG/1
TABLET ORAL
COMMUNITY
Start: 2022-12-23

## 2023-01-17 RX ORDER — CLINDAMYCIN PHOSPHATE 11.9 MG/ML
SOLUTION TOPICAL
Qty: 30 ML | Refills: 5 | Status: SHIPPED | OUTPATIENT
Start: 2023-01-17 | End: 2023-02-16

## 2023-01-17 RX ORDER — PEN NEEDLE, DIABETIC 32GX 5/32"
NEEDLE, DISPOSABLE MISCELLANEOUS
Qty: 100 EACH | Refills: 3 | Status: SHIPPED | OUTPATIENT
Start: 2023-01-17

## 2023-01-17 RX ORDER — CLINDAMYCIN HYDROCHLORIDE 300 MG/1
300 CAPSULE ORAL 3 TIMES DAILY
Qty: 21 CAPSULE | Refills: 0 | Status: SHIPPED | OUTPATIENT
Start: 2023-01-17 | End: 2023-01-24

## 2023-01-17 NOTE — PROGRESS NOTES
Nikki West (:  1960) is a 63 y.o. female,Established patient, here for evaluation of the following chief complaint(s):  Cyst (Under arm)         ASSESSMENT/PLAN:  1. Abscess, axilla  -     clindamycin (CLEOCIN) 300 MG capsule; Take 1 capsule by mouth 3 times daily for 7 days, Disp-21 capsule, R-0Normal  -     clindamycin (CLEOCIN-T) 1 % external solution; Apply topically 2 times daily. , Disp-30 mL, R-5, Normal      No follow-ups on file. Subjective   SUBJECTIVE/OBJECTIVE:  Nikki West is a 63 y.o. female. Patient presents with:  Cyst: Under arm      This is a 12year-old female who has a history of prior multiple cysts in her axillae. She had not had any for some time and then developed 1 in her right axilla. She notes that this is been growing and then last evening it tended to burst inside without any drainage. It is now starting to calm down and having no other significant issues. She is  and swollen in that area but no larger swelling. The patients PMH, surgical history, family history, medications, allergies were all reviewed and updated as appropriate today. Review of Systems       Objective   Physical Exam  Vitals and nursing note reviewed. Constitutional:       Appearance: Normal appearance. She is well-developed. HENT:      Head: Normocephalic and atraumatic. Right Ear: External ear normal.      Left Ear: External ear normal.      Nose: Nose normal.   Eyes:      Conjunctiva/sclera: Conjunctivae normal.      Pupils: Pupils are equal, round, and reactive to light. Cardiovascular:      Rate and Rhythm: Normal rate and regular rhythm. Heart sounds: Normal heart sounds. Pulmonary:      Effort: Pulmonary effort is normal.      Breath sounds: Normal breath sounds. Abdominal:      General: Bowel sounds are normal.      Palpations: Abdomen is soft. Musculoskeletal:         General: Normal range of motion.       Cervical back: Normal range of motion and neck supple. Skin:     General: Skin is dry. Comments: Indurated area in her right axilla. No fluctuance. Slightly tender. Previous scars present. Neurological:      Mental Status: She is alert and oriented to person, place, and time. Deep Tendon Reflexes: Reflexes are normal and symmetric. Psychiatric:         Behavior: Behavior normal.         Thought Content: Thought content normal.         Judgment: Judgment normal.                An electronic signature was used to authenticate this note.     --Jabari Barron MD

## 2023-01-17 NOTE — TELEPHONE ENCOUNTER
Patient is requesting an order for her Insulin Pen Needle (TECHLITE PEN NEEDLES) 32G X 4 MM MISC           [address] [place], [place] [address] [phone] Ashley Long [phone]   [address] [facility] [address]   Phone:  [phone]  Fax:  [phone]

## 2023-01-17 NOTE — TELEPHONE ENCOUNTER
Patient states she had a cyst under her right arm that burst on the inside. Slight pain and a little redness. Wants to know if she should be concerned? Will she need to be seen?   Advise

## 2023-01-17 NOTE — TELEPHONE ENCOUNTER
Medication:   Requested Prescriptions     Pending Prescriptions Disp Refills    Insulin Pen Needle (TECHLITE PEN NEEDLES) 32G X 4 MM MISC 100 each 3     Sig: USE WITH INSULIN ONCE DAILY       Last Filled:      Patient Phone Number: [phone] (home)     Last appt: 9/13/2022   Next appt: 4/4/2023    Last Labs DM:   Lab Results   Component Value Date/Time    LABA1C 7.3 10/04/2022 09:05 AM

## 2023-01-18 DIAGNOSIS — E03.9 HYPOTHYROIDISM, UNSPECIFIED TYPE: ICD-10-CM

## 2023-01-18 RX ORDER — LEVOTHYROXINE SODIUM 0.1 MG/1
TABLET ORAL
Qty: 30 TABLET | Refills: 3 | Status: SHIPPED | OUTPATIENT
Start: 2023-01-18

## 2023-01-21 DIAGNOSIS — L02.419 ABSCESS, AXILLA: ICD-10-CM

## 2023-01-23 RX ORDER — CLINDAMYCIN HYDROCHLORIDE 300 MG/1
CAPSULE ORAL
Qty: 21 CAPSULE | Refills: 0 | OUTPATIENT
Start: 2023-01-23

## 2023-02-27 DIAGNOSIS — E78.5 DYSLIPIDEMIA: ICD-10-CM

## 2023-02-27 RX ORDER — EZETIMIBE 10 MG/1
TABLET ORAL
Qty: 30 TABLET | Refills: 5 | Status: SHIPPED | OUTPATIENT
Start: 2023-02-27

## 2023-02-27 RX ORDER — EMPAGLIFLOZIN 10 MG/1
TABLET, FILM COATED ORAL
Qty: 90 TABLET | Refills: 1 | Status: SHIPPED | OUTPATIENT
Start: 2023-02-27

## 2023-02-27 NOTE — TELEPHONE ENCOUNTER
NOV- 04/04/2023        Requested Prescriptions     Pending Prescriptions Disp Refills    JARDIANCE 10 MG tablet [Pharmacy Med Name: JARDIANCE 10MG TABLET] 90 tablet 1     Sig: TAKE ONE (1) TABLET BY MOUTH DAILY

## 2023-02-27 NOTE — TELEPHONE ENCOUNTER
Last Office Visit  -  1/17/23  Next Office Visit  -  n/a    Last Filled  -  9/9/22  Last UDS -    Contract -

## 2023-03-06 RX ORDER — ROPINIROLE 2 MG/1
TABLET, FILM COATED ORAL
Qty: 60 TABLET | Refills: 5 | Status: SHIPPED | OUTPATIENT
Start: 2023-03-06 | End: 2023-03-08 | Stop reason: SDUPTHER

## 2023-03-06 NOTE — TELEPHONE ENCOUNTER
Patient has appt 3/8/23.  I called patient and she said that she has enough Requip to last her to her visit with Dr. Uday Sanders

## 2023-03-08 ENCOUNTER — HOSPITAL ENCOUNTER (OUTPATIENT)
Dept: PULMONOLOGY | Age: 63
Discharge: HOME | End: 2023-03-08
Payer: COMMERCIAL

## 2023-03-08 ENCOUNTER — OFFICE VISIT (OUTPATIENT)
Dept: PULMONOLOGY | Age: 63
End: 2023-03-08
Payer: COMMERCIAL

## 2023-03-08 VITALS
OXYGEN SATURATION: 95 % | WEIGHT: 165 LBS | HEIGHT: 60 IN | SYSTOLIC BLOOD PRESSURE: 128 MMHG | HEART RATE: 84 BPM | DIASTOLIC BLOOD PRESSURE: 60 MMHG | BODY MASS INDEX: 32.39 KG/M2

## 2023-03-08 VITALS — OXYGEN SATURATION: 96 %

## 2023-03-08 DIAGNOSIS — Z87.891 PERSONAL HISTORY OF TOBACCO USE: ICD-10-CM

## 2023-03-08 DIAGNOSIS — J44.9 CHRONIC OBSTRUCTIVE PULMONARY DISEASE, UNSPECIFIED COPD TYPE (HCC): Primary | ICD-10-CM

## 2023-03-08 DIAGNOSIS — G25.81 RLS (RESTLESS LEGS SYNDROME): ICD-10-CM

## 2023-03-08 DIAGNOSIS — J44.9 CHRONIC OBSTRUCTIVE PULMONARY DISEASE, UNSPECIFIED COPD TYPE (HCC): ICD-10-CM

## 2023-03-08 LAB
DLCO %PRED: 68 %
DLCO PRED: NORMAL
DLCO/VA %PRED: NORMAL
DLCO/VA PRED: NORMAL
DLCO/VA: NORMAL
DLCO: NORMAL
EXPIRATORY TIME-POST: NORMAL
EXPIRATORY TIME: NORMAL
FEF 25-75% %CHNG: NORMAL
FEF 25-75% %PRED-POST: NORMAL
FEF 25-75% %PRED-PRE: NORMAL
FEF 25-75% PRED: NORMAL
FEF 25-75%-POST: NORMAL
FEF 25-75%-PRE: NORMAL
FEV1 %PRED-POST: NORMAL
FEV1 %PRED-PRE: 83 %
FEV1 PRED: NORMAL
FEV1-POST: NORMAL
FEV1-PRE: NORMAL
FEV1/FVC %PRED-POST: NORMAL
FEV1/FVC %PRED-PRE: NORMAL
FEV1/FVC PRED: NORMAL
FEV1/FVC-POST: NORMAL
FEV1/FVC-PRE: 77 %
FVC %PRED-POST: NORMAL
FVC %PRED-PRE: NORMAL
FVC PRED: NORMAL
FVC-POST: NORMAL
FVC-PRE: NORMAL
GAW %PRED: NORMAL
GAW PRED: NORMAL
GAW: NORMAL
IC %PRED: NORMAL
IC PRED: NORMAL
IC: NORMAL
MEP: NORMAL
MIP: NORMAL
MVV %PRED-PRE: NORMAL
MVV PRED: NORMAL
MVV-PRE: NORMAL
PEF %PRED-POST: NORMAL
PEF %PRED-PRE: NORMAL
PEF PRED: NORMAL
PEF%CHNG: NORMAL
PEF-POST: NORMAL
PEF-PRE: NORMAL
RAW %PRED: NORMAL
RAW PRED: NORMAL
RAW: NORMAL
RV %PRED: NORMAL
RV PRED: NORMAL
RV: NORMAL
SVC %PRED: NORMAL
SVC PRED: NORMAL
SVC: NORMAL
TLC %PRED: 87 %
TLC PRED: NORMAL
TLC: NORMAL
VA %PRED: NORMAL
VA PRED: NORMAL
VA: NORMAL
VTG %PRED: NORMAL
VTG PRED: NORMAL
VTG: NORMAL

## 2023-03-08 PROCEDURE — 99214 OFFICE O/P EST MOD 30 MIN: CPT | Performed by: INTERNAL MEDICINE

## 2023-03-08 PROCEDURE — 94729 DIFFUSING CAPACITY: CPT

## 2023-03-08 PROCEDURE — 4004F PT TOBACCO SCREEN RCVD TLK: CPT | Performed by: INTERNAL MEDICINE

## 2023-03-08 PROCEDURE — G8484 FLU IMMUNIZE NO ADMIN: HCPCS | Performed by: INTERNAL MEDICINE

## 2023-03-08 PROCEDURE — G8417 CALC BMI ABV UP PARAM F/U: HCPCS | Performed by: INTERNAL MEDICINE

## 2023-03-08 PROCEDURE — G8427 DOCREV CUR MEDS BY ELIG CLIN: HCPCS | Performed by: INTERNAL MEDICINE

## 2023-03-08 PROCEDURE — 94010 BREATHING CAPACITY TEST: CPT

## 2023-03-08 PROCEDURE — 3023F SPIROM DOC REV: CPT | Performed by: INTERNAL MEDICINE

## 2023-03-08 PROCEDURE — 94760 N-INVAS EAR/PLS OXIMETRY 1: CPT

## 2023-03-08 PROCEDURE — 3017F COLORECTAL CA SCREEN DOC REV: CPT | Performed by: INTERNAL MEDICINE

## 2023-03-08 PROCEDURE — 94726 PLETHYSMOGRAPHY LUNG VOLUMES: CPT

## 2023-03-08 PROCEDURE — 3078F DIAST BP <80 MM HG: CPT | Performed by: INTERNAL MEDICINE

## 2023-03-08 PROCEDURE — 3074F SYST BP LT 130 MM HG: CPT | Performed by: INTERNAL MEDICINE

## 2023-03-08 RX ORDER — ALBUTEROL SULFATE 90 UG/1
2 AEROSOL, METERED RESPIRATORY (INHALATION) EVERY 4 HOURS PRN
Qty: 1 EACH | Refills: 5 | Status: SHIPPED | OUTPATIENT
Start: 2023-03-08

## 2023-03-08 RX ORDER — ROPINIROLE 2 MG/1
TABLET, FILM COATED ORAL
Qty: 60 TABLET | Refills: 5 | Status: SHIPPED | OUTPATIENT
Start: 2023-03-08

## 2023-03-08 ASSESSMENT — PULMONARY FUNCTION TESTS
FEV1/FVC_PRE: 77
FEV1_PERCENT_PREDICTED_PRE: 83

## 2023-03-08 NOTE — PROGRESS NOTES
CHIEF COMPLAINT:  Restless legs syndrome     HPI:   Interval history:   COPD:   dyspnea on exertion with stairs     RLS: uses Requip with benefit, 2 mg morning, 2 mg evening. Leg sx don't usually start until after morning though. Dose not changed. No compulsive behavior. No sleep attacks. Legs occasionally still give trouble, but she is managing. She feels she is doing well. Does drive but never when sleepy. Presenting history:   The patient is a 51 yo woman with hx of tobacco abuse, brain aneurysm, DM presents with chronic dyspnea on exertion. Patient complains of shortness of breath. Symptoms include dyspnea on exertion. Symptoms began a few years ago, gradually worsening since that time. The dyspnea occurs with moderate activity. Patient denies hemoptysis . Aggravating factors include exertion, exercise and climbing stairs. The patient reports an exercise tolerance of approximately 3 blocks on the flat and 1 flight of stairs, limited primarily by dyspnea and leg pain. She smokes 1 ppd x 32 years. She has used spiriva and advair in the past without benefit. Currently using symbicort and albuterol with some response. Initially followed by Dr. Angie Luciano. reports that she has been smoking cigarettes. She started smoking about 45 years ago. She has a 58.50 pack-year smoking history. She has never used smokeless tobacco.    Objective:   Blood pressure 128/60, pulse 84, height 5' (1.524 m), weight 165 lb (74.8 kg), SpO2 95 %, not currently breastfeeding. Constitutional:  No acute distress. HENT:  Oropharynx is clear and moist.   Neck: No tracheal deviation present. Cardiovascular: Normal heart sounds. No lower extremity edema. Pulmonary/Chest: No wheezes. No rhonchi. No rales. No decreased breath sounds. No accessory muscle usage or stridor. Musculoskeletal: No cyanosis. No clubbing. Skin: Skin is warm and dry. Psychiatric: Normal mood and affect.   Neurologic: speech fluent, alert and oriented, strength symmetric         PFTs 10/5/12  FVC 2.72(88%) FEV1 2.12 (86%) FEV1/FVC ratio  78% TLC 3.82 (83%) DLCO 14.71 (68%)  No BD response  PFT 4/29/14 FEV1 2.07 L 86% TLC 3.69 L (80%) DLCO 13.76 64%  PFT 7/19/18 FEV1 2.03 L 88% TLC 3.91 L 85%   DLCO 12.22 58%  PFT 3/8/23  FEV1 1.83 L 83% TLC 3.96 L 87%   DLCO 14 68%    6MWT 1120 feet, low sat 95%    IMAGING:    LDCT 8/25/22  There is a tiny 1-2 mm nodule in the left upper lobe, stable.  No new or   suspicious nodule otherwise noted.       Potentially Significant Incidentals (Lung-RADS Category S): None       Pulmonary incidentals:  Calcified subcarinal lymph nodes representing   granulomatous change.  There is also a granuloma in the left lower lobe.       Echo 2015  - Left ventricle: The cavity size was normal. Wall thickness  was normal. Systolic function was normal. The estimated  ejection fraction was in the range of 60% to 65%. Wall  motion was normal; there were no regional wall motion  abnormalities. Left ventricular diastolic function  parameters were normal. Pulmonary arteries: PA peak  pressure: 37mm Hg (S).      ASSESSMENT AND PLAN:  Pulmonary Centrilobular Emphysema    Advair 115/21 BID (abdominal cramps not better off salmeterol), albuterol MDI and duonebs; intolerant of Spiriva due to sensation of swelling in throat.     Restless legs syndrome:   Requip 2 mg at bedtime and up to 2 mg earlier in day.  She is doing well and is stable   Depression: treated at [facility] in [place], has been long term treated with Venlafaxine   Tobacco abuse -  discussed importance of quitting    Greater than 44 pack year history.  Pre-contemplative  H/O vocal cord polyp.  F/B Dr. La    LDCT for LCS Aug 2023, see me after      Screening CT scan was considered in a lung cancer screening counseling and shared decision making visit today that included the following elements:   Eligibility: Age: 62.  There are no signs or symptoms of lung cancer.  Tobacco History 44  pack-years, quit zero years ago  Verbal counseling has been performed by me to include benefits and harms of screening, follow-up diagnostic testing, over-diagnosis, false positive rate, and total radiation exposure;   I have counseled on the importance of adherence to annual lung cancer LDCT screening, the impact of comorbidities and patient is willing to undergo diagnosis and treatment;   I have provided counseling on the importance of maintaining cigarette smoking abstinence if former smoker; or the importance of smoking cessation if current smoker and, if appropriate, furnishing of information about tobacco cessation interventions; and   I have furnished a written order for lung cancer screening with LDCT. Order for Screening chest CT scan should be placed with documentation as below:  Beneficiary date of birth;    Actual pack - year smoking history (number) from above;   Current smoking status, and for former smokers, the number of years since quitting smoking from above  Beneficiary is asymptomatic   National Provider Identifier (NPI) for Vinod Ochoa [id number]

## 2023-03-09 NOTE — PROCEDURES
[address]                 [address] [address]                               PULMONARY FUNCTION    PATIENT NAME: Ezekiel Rater                   :        1960  MED REC NO:   [id number]                          ROOM:  ACCOUNT NO:   [de-identified]                           ADMIT DATE: 2023  PROVIDER:     Zonia Muir MD    DATE OF PROCEDURE:  2023    REQUESTING PROVIDER:  Bhavna Farooq MD    INDICATION:  COPD. FINDINGS:  1. Spirometry: The FVC is 84% of predicted. The FEV1 is 1.83 liters  which is 83% of predicted. The FEV1/FVC ratio is 0.77.  2.  Plethysmography:  The total lung capacity is 87% of predicted. 3.  The diffusion capacity of carbon monoxide is decreased. 4.  The flow volume loop is within normal limits. IMPRESSION:  There is no obstructive or restrictive defect present. There is an isolated decrease in the diffusion capacity, which is  nonspecific, but could be seen with emphysema. Of note, the patient was  not given albuterol as the patient stated an allergy.         Donn Guzman MD    D: 2023 19:16:24       T: 2023 19:18:42     [email or web address]  Job#: [id number]     Doc#: [id number]    CC:

## 2023-03-13 DIAGNOSIS — J30.9 ALLERGIC RHINITIS: ICD-10-CM

## 2023-03-13 DIAGNOSIS — L02.419 ABSCESS, AXILLA: ICD-10-CM

## 2023-03-13 DIAGNOSIS — E11.00 UNCONTROLLED TYPE 2 DIABETES MELLITUS WITH HYPEROSMOLARITY WITHOUT COMA, WITHOUT LONG-TERM CURRENT USE OF INSULIN (HCC): ICD-10-CM

## 2023-03-13 RX ORDER — LORATADINE 10 MG/1
TABLET ORAL
Qty: 30 TABLET | Refills: 11 | Status: SHIPPED | OUTPATIENT
Start: 2023-03-13

## 2023-03-13 RX ORDER — METFORMIN HYDROCHLORIDE 500 MG/1
TABLET, EXTENDED RELEASE ORAL
Qty: 60 TABLET | Refills: 5 | Status: SHIPPED | OUTPATIENT
Start: 2023-03-13

## 2023-03-13 RX ORDER — CLINDAMYCIN PHOSPHATE 11.9 MG/ML
SOLUTION TOPICAL
Qty: 30 ML | Refills: 5 | Status: SHIPPED | OUTPATIENT
Start: 2023-03-13

## 2023-03-13 NOTE — TELEPHONE ENCOUNTER
Last Office Visit  -  01/17/2023  Next Office Visit  -  n/a    Last Filled  -    Last UDS -    Contract -

## 2023-03-27 RX ORDER — IBUPROFEN 800 MG/1
TABLET ORAL
Qty: 60 TABLET | Refills: 5 | Status: SHIPPED | OUTPATIENT
Start: 2023-03-27

## 2023-03-27 RX ORDER — OMEPRAZOLE 40 MG/1
CAPSULE, DELAYED RELEASE ORAL
Qty: 60 CAPSULE | Refills: 2 | Status: SHIPPED | OUTPATIENT
Start: 2023-03-27

## 2023-03-27 RX ORDER — LOSARTAN POTASSIUM 100 MG/1
TABLET ORAL
Qty: 90 TABLET | Refills: 3 | Status: SHIPPED | OUTPATIENT
Start: 2023-03-27

## 2023-03-27 NOTE — TELEPHONE ENCOUNTER
Last Office Visit  -  1/17/23  Next Office Visit  -  n/a    Last Filled  -    Last UDS -    Contract -

## 2023-04-04 ENCOUNTER — OFFICE VISIT (OUTPATIENT)
Dept: ENDOCRINOLOGY | Age: 63
End: 2023-04-04
Payer: COMMERCIAL

## 2023-04-04 VITALS
HEART RATE: 91 BPM | BODY MASS INDEX: 32.75 KG/M2 | OXYGEN SATURATION: 97 % | WEIGHT: 166.8 LBS | RESPIRATION RATE: 15 BRPM | HEIGHT: 60 IN | SYSTOLIC BLOOD PRESSURE: 122 MMHG | DIASTOLIC BLOOD PRESSURE: 63 MMHG | TEMPERATURE: 97 F

## 2023-04-04 DIAGNOSIS — E11.9 CONTROLLED TYPE 2 DIABETES MELLITUS WITHOUT COMPLICATION, WITH LONG-TERM CURRENT USE OF INSULIN (HCC): ICD-10-CM

## 2023-04-04 DIAGNOSIS — Z79.4 CONTROLLED TYPE 2 DIABETES MELLITUS WITHOUT COMPLICATION, WITH LONG-TERM CURRENT USE OF INSULIN (HCC): ICD-10-CM

## 2023-04-04 LAB — HBA1C MFR BLD: 6.7 %

## 2023-04-04 PROCEDURE — 2022F DILAT RTA XM EVC RTNOPTHY: CPT | Performed by: INTERNAL MEDICINE

## 2023-04-04 PROCEDURE — 99214 OFFICE O/P EST MOD 30 MIN: CPT | Performed by: INTERNAL MEDICINE

## 2023-04-04 PROCEDURE — 83036 HEMOGLOBIN GLYCOSYLATED A1C: CPT | Performed by: INTERNAL MEDICINE

## 2023-04-04 PROCEDURE — 3078F DIAST BP <80 MM HG: CPT | Performed by: INTERNAL MEDICINE

## 2023-04-04 PROCEDURE — 4004F PT TOBACCO SCREEN RCVD TLK: CPT | Performed by: INTERNAL MEDICINE

## 2023-04-04 PROCEDURE — 3046F HEMOGLOBIN A1C LEVEL >9.0%: CPT | Performed by: INTERNAL MEDICINE

## 2023-04-04 PROCEDURE — 3017F COLORECTAL CA SCREEN DOC REV: CPT | Performed by: INTERNAL MEDICINE

## 2023-04-04 PROCEDURE — 3074F SYST BP LT 130 MM HG: CPT | Performed by: INTERNAL MEDICINE

## 2023-04-04 PROCEDURE — G8427 DOCREV CUR MEDS BY ELIG CLIN: HCPCS | Performed by: INTERNAL MEDICINE

## 2023-04-04 PROCEDURE — G8417 CALC BMI ABV UP PARAM F/U: HCPCS | Performed by: INTERNAL MEDICINE

## 2023-04-04 RX ORDER — INSULIN GLARGINE 100 [IU]/ML
INJECTION, SOLUTION SUBCUTANEOUS
Qty: 15 ML | Refills: 5 | Status: SHIPPED | OUTPATIENT
Start: 2023-04-04

## 2023-04-04 RX ORDER — LANCETS 28 GAUGE
1 EACH MISCELLANEOUS DAILY
Qty: 100 EACH | Refills: 3 | Status: SHIPPED | OUTPATIENT
Start: 2023-04-04

## 2023-04-04 RX ORDER — PEN NEEDLE, DIABETIC 32GX 5/32"
NEEDLE, DISPOSABLE MISCELLANEOUS
Qty: 100 EACH | Refills: 3 | Status: SHIPPED | OUTPATIENT
Start: 2023-04-04

## 2023-04-04 RX ORDER — CALCIUM CITRATE/VITAMIN D3 200MG-6.25
TABLET ORAL
Qty: 100 EACH | Refills: 3 | Status: SHIPPED | OUTPATIENT
Start: 2023-04-04

## 2023-04-04 NOTE — PROGRESS NOTES
08/23/2021     Lab Results   Component Value Date    LABA1C 7.3 10/04/2022       Assessment:     Vin Mejia is a 63 y.o. female with :    1.T2DM: Controlled, A1c high-now improved. Discussed goals, she cut down regular soda, referred to dietician. Advised may need rapid acting insulin, started GLP-1 agonist but did not tolerate. A1c is better  Added SGLT-2 inhibitor  lantus 48 units  2. Hypothyroidism: TSH at goal  3. HLD: LDL at goal  4. Obesity       Plan:      Lantus 48 units, advised self titration   continue metformin    jardiance   Advise to check blood sugar 2 times a day   Patient to send blood sugar log for titration. Advise to low simple carbohydrate and protein with each  meal diet. Diabetes Care: recommend yearly eye exam, foot exam and urine microalbumin to   creatinine ratio. Patient is up-to-date.

## 2023-04-24 DIAGNOSIS — D50.9 IRON DEFICIENCY ANEMIA, UNSPECIFIED IRON DEFICIENCY ANEMIA TYPE: ICD-10-CM

## 2023-04-24 RX ORDER — DOXYCYCLINE HYCLATE 50 MG/1
CAPSULE, GELATIN COATED ORAL
Qty: 60 TABLET | Refills: 3 | Status: SHIPPED | OUTPATIENT
Start: 2023-04-24

## 2023-04-24 NOTE — TELEPHONE ENCOUNTER
Last Office Visit  -  1/17/23  Next Office Visit  -  n/a    Last Filled  -  1/3/23  Last UDS -    Contract -

## 2023-05-22 RX ORDER — OMEPRAZOLE 40 MG/1
CAPSULE, DELAYED RELEASE ORAL
Qty: 60 CAPSULE | Refills: 2 | Status: SHIPPED | OUTPATIENT
Start: 2023-05-22

## 2023-05-22 NOTE — TELEPHONE ENCOUNTER
Last Office Visit  -  1/17/23  Next Office Visit  -  n/a    Last Filled  -  3/27/23  Last UDS -    Contract -

## 2023-06-05 RX ORDER — FLUTICASONE PROPIONATE AND SALMETEROL XINAFOATE 115; 21 UG/1; UG/1
AEROSOL, METERED RESPIRATORY (INHALATION)
Qty: 12 G | Refills: 5 | OUTPATIENT
Start: 2023-06-05

## 2023-07-03 DIAGNOSIS — L02.419 ABSCESS, AXILLA: ICD-10-CM

## 2023-07-03 RX ORDER — CLINDAMYCIN PHOSPHATE 11.9 MG/ML
SOLUTION TOPICAL
Qty: 30 ML | Refills: 5 | Status: SHIPPED | OUTPATIENT
Start: 2023-07-03

## 2023-07-03 NOTE — TELEPHONE ENCOUNTER
Last Office Visit  -  1/17/23  Next Office Visit  -  n/a    Last Filled  -  3/13/23  Last UDS -    Contract -

## 2023-07-10 DIAGNOSIS — E11.00 UNCONTROLLED TYPE 2 DIABETES MELLITUS WITH HYPEROSMOLARITY WITHOUT COMA, WITHOUT LONG-TERM CURRENT USE OF INSULIN (HCC): ICD-10-CM

## 2023-07-10 RX ORDER — LISINOPRIL 5 MG/1
TABLET ORAL
Qty: 90 TABLET | Refills: 1 | Status: SHIPPED | OUTPATIENT
Start: 2023-07-10

## 2023-07-10 NOTE — TELEPHONE ENCOUNTER
Medication:   Requested Prescriptions     Pending Prescriptions Disp Refills    lisinopril (PRINIVIL;ZESTRIL) 5 MG tablet [Pharmacy Med Name: LISINOPRIL 5MG TABLET] 90 tablet 1     Sig: TAKE ONE (1) TABLET BY MOUTH DAILY       Last Filled:      Patient Phone Number: [phone] (home)     Last appt: 4/4/2023   Next appt: 7/24/2023    Last Lipid:   Lab Results   Component Value Date/Time    CHOL 201 10/04/2022 09:05 AM    TRIG 153 10/04/2022 09:05 AM    HDL 86 10/04/2022 09:05 AM    HDL 62 12/27/2010 12:59 PM    [facility] [place], [place] 84 10/04/2022 09:05 AM

## 2023-07-24 ENCOUNTER — OFFICE VISIT (OUTPATIENT)
Dept: ENDOCRINOLOGY | Age: 63
End: 2023-07-24
Payer: COMMERCIAL

## 2023-07-24 VITALS
SYSTOLIC BLOOD PRESSURE: 137 MMHG | HEART RATE: 100 BPM | HEIGHT: 60 IN | DIASTOLIC BLOOD PRESSURE: 55 MMHG | RESPIRATION RATE: 16 BRPM | TEMPERATURE: 98 F | BODY MASS INDEX: 32.67 KG/M2 | WEIGHT: 166.4 LBS | OXYGEN SATURATION: 96 %

## 2023-07-24 DIAGNOSIS — E11.9 CONTROLLED TYPE 2 DIABETES MELLITUS WITHOUT COMPLICATION, WITH LONG-TERM CURRENT USE OF INSULIN (HCC): ICD-10-CM

## 2023-07-24 DIAGNOSIS — Z79.4 CONTROLLED TYPE 2 DIABETES MELLITUS WITHOUT COMPLICATION, WITH LONG-TERM CURRENT USE OF INSULIN (HCC): ICD-10-CM

## 2023-07-24 DIAGNOSIS — E11.00 UNCONTROLLED TYPE 2 DIABETES MELLITUS WITH HYPEROSMOLARITY WITHOUT COMA, WITHOUT LONG-TERM CURRENT USE OF INSULIN (HCC): ICD-10-CM

## 2023-07-24 LAB — TSH SERPL DL<=0.005 MIU/L-ACNC: 0.85 UIU/ML (ref 0.27–4.2)

## 2023-07-24 PROCEDURE — 3017F COLORECTAL CA SCREEN DOC REV: CPT | Performed by: INTERNAL MEDICINE

## 2023-07-24 PROCEDURE — 99214 OFFICE O/P EST MOD 30 MIN: CPT | Performed by: INTERNAL MEDICINE

## 2023-07-24 PROCEDURE — 3075F SYST BP GE 130 - 139MM HG: CPT | Performed by: INTERNAL MEDICINE

## 2023-07-24 PROCEDURE — 3044F HG A1C LEVEL LT 7.0%: CPT | Performed by: INTERNAL MEDICINE

## 2023-07-24 PROCEDURE — G8427 DOCREV CUR MEDS BY ELIG CLIN: HCPCS | Performed by: INTERNAL MEDICINE

## 2023-07-24 PROCEDURE — 4004F PT TOBACCO SCREEN RCVD TLK: CPT | Performed by: INTERNAL MEDICINE

## 2023-07-24 PROCEDURE — 3078F DIAST BP <80 MM HG: CPT | Performed by: INTERNAL MEDICINE

## 2023-07-24 PROCEDURE — 2022F DILAT RTA XM EVC RTNOPTHY: CPT | Performed by: INTERNAL MEDICINE

## 2023-07-24 PROCEDURE — G8417 CALC BMI ABV UP PARAM F/U: HCPCS | Performed by: INTERNAL MEDICINE

## 2023-07-24 RX ORDER — LISINOPRIL 5 MG/1
TABLET ORAL
Qty: 90 TABLET | Refills: 1 | Status: SHIPPED | OUTPATIENT
Start: 2023-07-24

## 2023-07-24 RX ORDER — INSULIN GLARGINE 100 [IU]/ML
INJECTION, SOLUTION SUBCUTANEOUS
Qty: 15 ML | Refills: 5 | Status: SHIPPED | OUTPATIENT
Start: 2023-07-24

## 2023-07-24 RX ORDER — PEN NEEDLE, DIABETIC 32GX 5/32"
NEEDLE, DISPOSABLE MISCELLANEOUS
Qty: 100 EACH | Refills: 3 | Status: SHIPPED | OUTPATIENT
Start: 2023-07-24

## 2023-07-24 NOTE — PROGRESS NOTES
Seen as patient for diabetes    Has seen Casi Ward    Interim:    No issues  Stable glucose  Forgot log  Lost 10 lbs    Stopped trulicity due to diarrhea    Diagnosed with Type 2 diabetes mellitus in    Known diabetic complications: none   Uncontrolled, moderate    Current diabetic medications     Lantus 48 units  Metformin 500mg BID  Jardiance    States has not been on any other medication    Last A1c 6.7%<----7.8%<---- 8.2%<-----  7.4%<-----7.8%<---- 10.8%<----- 9.5%<--- 11.9% <---- 7.1%    Prior visit with dietician: no  Current diet: on average, 3 meals per day  Drinks regular soda cut down from 6 to 0/day  Current exercise: walking   Current monitoring regimen: home blood tests -1  times daily     Has brought blood glucose log/meter: yes  Home blood sugar records: 100s  Any episodes of hypoglycemia? ----  Worsened by high CHO    No Hx of CAD , PVD, CVA    Hyperlipidemia:   For   Years  Takes lipitor 40mg zetia  Controlled      Last eye exam: 10/22  Last foot exam:   Last microalbumin to creatinine ratio: 213 on ---> 120 on   Lisinopril 5mg    She is on levothyroxine, stable    She is Moni's aunt    Past Medical History:   Diagnosis Date    Anxiety     Bipolar disorder ([address])     Brain aneurysm     Zucarello    Closed angle glaucoma     COPD (chronic obstructive pulmonary disease) ([address])     Hyperlipidemia     Hypertension     Hypothyroidism     Migraines     Open-angle glaucoma of both eyes     RLS (restless legs syndrome)     Rotator cuff disorder     right shoulder    TIA (transient ischemic attack) 11    Tobacco use disorder     Type II or unspecified type diabetes mellitus without mention of complication, not stated as uncontrolled     diet controlled     Past Surgical History:   Procedure Laterality Date    BLADDER SUSPENSION      CARPAL TUNNEL RELEASE Bilateral     right and left     SECTION      x 2    CHOLECYSTECTOMY      COLONOSCOPY      COLONOSCOPY  2016

## 2023-07-25 LAB
EST. AVERAGE GLUCOSE BLD GHB EST-MCNC: 145.6 MG/DL
HBA1C MFR BLD: 6.7 %

## 2023-08-13 DIAGNOSIS — J44.9 CHRONIC OBSTRUCTIVE PULMONARY DISEASE, UNSPECIFIED COPD TYPE (HCC): Primary | ICD-10-CM

## 2023-08-13 DIAGNOSIS — E78.5 DYSLIPIDEMIA: ICD-10-CM

## 2023-08-13 DIAGNOSIS — E11.00 UNCONTROLLED TYPE 2 DIABETES MELLITUS WITH HYPEROSMOLARITY WITHOUT COMA, WITHOUT LONG-TERM CURRENT USE OF INSULIN (HCC): ICD-10-CM

## 2023-08-13 RX ORDER — EZETIMIBE 10 MG/1
TABLET ORAL
Qty: 30 TABLET | Refills: 5 | Status: SHIPPED | OUTPATIENT
Start: 2023-08-13

## 2023-08-13 RX ORDER — METFORMIN HYDROCHLORIDE 500 MG/1
TABLET, EXTENDED RELEASE ORAL
Qty: 60 TABLET | Refills: 5 | Status: SHIPPED | OUTPATIENT
Start: 2023-08-13

## 2023-08-14 DIAGNOSIS — E03.9 HYPOTHYROIDISM, UNSPECIFIED TYPE: ICD-10-CM

## 2023-08-14 RX ORDER — ROPINIROLE 2 MG/1
TABLET, FILM COATED ORAL
Qty: 60 TABLET | Refills: 5 | Status: SHIPPED | OUTPATIENT
Start: 2023-08-14

## 2023-08-14 RX ORDER — ALBUTEROL SULFATE 90 UG/1
AEROSOL, METERED RESPIRATORY (INHALATION)
Qty: 18 G | Refills: 5 | Status: SHIPPED | OUTPATIENT
Start: 2023-08-14

## 2023-08-14 RX ORDER — LEVOTHYROXINE SODIUM 0.1 MG/1
100 TABLET ORAL DAILY
Qty: 30 TABLET | Refills: 3 | Status: SHIPPED | OUTPATIENT
Start: 2023-08-14

## 2023-08-14 NOTE — TELEPHONE ENCOUNTER
Last Office Visit  -  1/17/23  Next Office Visit  -  n/a    Last Filled  -  4/10/23  Last UDS -    Contract -

## 2023-08-28 ENCOUNTER — HOSPITAL ENCOUNTER (OUTPATIENT)
Dept: CT IMAGING | Age: 63
Discharge: HOME | End: 2023-08-28
Payer: COMMERCIAL

## 2023-08-28 DIAGNOSIS — Z87.891 PERSONAL HISTORY OF TOBACCO USE: ICD-10-CM

## 2023-08-28 PROCEDURE — 71271 CT THORAX LUNG CANCER SCR C-: CPT

## 2023-08-30 ENCOUNTER — OFFICE VISIT (OUTPATIENT)
Dept: PULMONOLOGY | Age: 63
End: 2023-08-30
Payer: COMMERCIAL

## 2023-08-30 VITALS
BODY MASS INDEX: 31.1 KG/M2 | WEIGHT: 158.4 LBS | HEART RATE: 102 BPM | RESPIRATION RATE: 16 BRPM | SYSTOLIC BLOOD PRESSURE: 134 MMHG | DIASTOLIC BLOOD PRESSURE: 60 MMHG | HEIGHT: 60 IN | OXYGEN SATURATION: 100 %

## 2023-08-30 DIAGNOSIS — G25.81 RLS (RESTLESS LEGS SYNDROME): ICD-10-CM

## 2023-08-30 DIAGNOSIS — J43.2 CENTRILOBULAR EMPHYSEMA (HCC): Primary | ICD-10-CM

## 2023-08-30 PROCEDURE — 3023F SPIROM DOC REV: CPT | Performed by: INTERNAL MEDICINE

## 2023-08-30 PROCEDURE — 4004F PT TOBACCO SCREEN RCVD TLK: CPT | Performed by: INTERNAL MEDICINE

## 2023-08-30 PROCEDURE — G8427 DOCREV CUR MEDS BY ELIG CLIN: HCPCS | Performed by: INTERNAL MEDICINE

## 2023-08-30 PROCEDURE — G8417 CALC BMI ABV UP PARAM F/U: HCPCS | Performed by: INTERNAL MEDICINE

## 2023-08-30 PROCEDURE — 3017F COLORECTAL CA SCREEN DOC REV: CPT | Performed by: INTERNAL MEDICINE

## 2023-08-30 PROCEDURE — 3078F DIAST BP <80 MM HG: CPT | Performed by: INTERNAL MEDICINE

## 2023-08-30 PROCEDURE — 3075F SYST BP GE 130 - 139MM HG: CPT | Performed by: INTERNAL MEDICINE

## 2023-08-30 PROCEDURE — 99214 OFFICE O/P EST MOD 30 MIN: CPT | Performed by: INTERNAL MEDICINE

## 2023-08-30 RX ORDER — DORZOLAMIDE HCL 20 MG/ML
SOLUTION/ DROPS OPHTHALMIC
COMMUNITY
Start: 2023-08-12

## 2023-08-30 NOTE — PROGRESS NOTES
affect. Neurologic: speech fluent, alert and oriented, strength symmetric         PFTs 10/5/12  FVC 2.72(88%) FEV1 2.12 (86%) FEV1/FVC ratio  78% TLC 3.82 (83%) DLCO 14.71 (68%)  No BD response  PFT 4/29/14 FEV1 2.07 L 86% TLC 3.69 L (80%) DLCO 13.76 64%  PFT 7/19/18 FEV1 2.03 L 88% TLC 3.91 L 85%   DLCO 12.22 58%  PFT 3/8/23  FEV1 1.83 L 83% TLC 3.96 L 87%   DLCO 14 68%    6MWT 1120 feet, low sat 95%    IMAGING:    LDCT 8/28/23  2 mm nodule in the left lung apex, image 19, stable. Calcified granulomata    Other incidentals: Moderate atherosclerotic calcification of the coronary  arteries. Echo 2015  - Left ventricle: The cavity size was normal. Wall thickness  was normal. Systolic function was normal. The estimated  ejection fraction was in the range of 60% to 65%. Wall  motion was normal; there were no regional wall motion  abnormalities. Left ventricular diastolic function  parameters were normal. Pulmonary arteries: PA peak  pressure: 37mm Hg (S). ASSESSMENT AND PLAN:  Pulmonary Centrilobular Emphysema    Advair 115/21 BID (abdominal cramps not better off salmeterol), albuterol MDI and duonebs; intolerant of Spiriva due to sensation of swelling in throat. Restless legs syndrome:   Requip 2 mg at bedtime and up to 2 mg earlier in day. She is doing well and is stable   Depression: treated at [address] [place] [address] in [facility], has been long term treated with Venlafaxine   Tobacco abuse -  discussed importance of quitting    Greater than 44 pack year history. Pre-contemplative   We discussed chantix and wellbutrin today   H/O vocal cord polyp.   F/B Dr. Rashmi Mak    See me in 6 months   LDCT for LCS Aug 2024, schedule at future visit

## 2023-10-07 DIAGNOSIS — D50.9 IRON DEFICIENCY ANEMIA, UNSPECIFIED IRON DEFICIENCY ANEMIA TYPE: ICD-10-CM

## 2023-10-09 RX ORDER — DOXYCYCLINE HYCLATE 50 MG/1
CAPSULE, GELATIN COATED ORAL
Qty: 60 TABLET | Refills: 3 | Status: SHIPPED | OUTPATIENT
Start: 2023-10-09

## 2023-10-11 ENCOUNTER — TELEPHONE (OUTPATIENT)
Dept: ENDOCRINOLOGY | Age: 63
End: 2023-10-11

## 2023-10-11 DIAGNOSIS — E11.9 CONTROLLED TYPE 2 DIABETES MELLITUS WITHOUT COMPLICATION, WITH LONG-TERM CURRENT USE OF INSULIN (HCC): Primary | ICD-10-CM

## 2023-10-11 DIAGNOSIS — Z79.4 CONTROLLED TYPE 2 DIABETES MELLITUS WITHOUT COMPLICATION, WITH LONG-TERM CURRENT USE OF INSULIN (HCC): Primary | ICD-10-CM

## 2023-10-11 RX ORDER — BLOOD-GLUCOSE METER
1 KIT MISCELLANEOUS DAILY
Qty: 100 STRIP | Refills: 0 | Status: SHIPPED | OUTPATIENT
Start: 2023-10-11

## 2023-10-11 NOTE — TELEPHONE ENCOUNTER
Call from [employer] requesting refill for [facility]    [place] [address], [address] [phone] Brian Spicer [phone]   [address] [place] [address], [address] [place] [address] Drive [id number]     LOV   7-4-23  [facility]    12-4-23

## 2023-10-19 RX ORDER — OMEPRAZOLE 40 MG/1
40 CAPSULE, DELAYED RELEASE ORAL 2 TIMES DAILY
Qty: 60 CAPSULE | Refills: 2 | Status: SHIPPED | OUTPATIENT
Start: 2023-10-19

## 2023-12-04 ENCOUNTER — OFFICE VISIT (OUTPATIENT)
Dept: ENDOCRINOLOGY | Age: 63
End: 2023-12-04
Payer: COMMERCIAL

## 2023-12-04 VITALS
OXYGEN SATURATION: 97 % | DIASTOLIC BLOOD PRESSURE: 65 MMHG | RESPIRATION RATE: 15 BRPM | HEART RATE: 90 BPM | BODY MASS INDEX: 31.49 KG/M2 | HEIGHT: 60 IN | TEMPERATURE: 98 F | WEIGHT: 160.4 LBS | SYSTOLIC BLOOD PRESSURE: 130 MMHG

## 2023-12-04 DIAGNOSIS — Z79.4 CONTROLLED TYPE 2 DIABETES MELLITUS WITHOUT COMPLICATION, WITH LONG-TERM CURRENT USE OF INSULIN (HCC): ICD-10-CM

## 2023-12-04 DIAGNOSIS — E11.9 CONTROLLED TYPE 2 DIABETES MELLITUS WITHOUT COMPLICATION, WITH LONG-TERM CURRENT USE OF INSULIN (HCC): ICD-10-CM

## 2023-12-04 LAB — HBA1C MFR BLD: 6.5 %

## 2023-12-04 PROCEDURE — 83036 HEMOGLOBIN GLYCOSYLATED A1C: CPT | Performed by: INTERNAL MEDICINE

## 2023-12-04 PROCEDURE — G8417 CALC BMI ABV UP PARAM F/U: HCPCS | Performed by: INTERNAL MEDICINE

## 2023-12-04 PROCEDURE — 3017F COLORECTAL CA SCREEN DOC REV: CPT | Performed by: INTERNAL MEDICINE

## 2023-12-04 PROCEDURE — 4004F PT TOBACCO SCREEN RCVD TLK: CPT | Performed by: INTERNAL MEDICINE

## 2023-12-04 PROCEDURE — 3044F HG A1C LEVEL LT 7.0%: CPT | Performed by: INTERNAL MEDICINE

## 2023-12-04 PROCEDURE — 3075F SYST BP GE 130 - 139MM HG: CPT | Performed by: INTERNAL MEDICINE

## 2023-12-04 PROCEDURE — 2022F DILAT RTA XM EVC RTNOPTHY: CPT | Performed by: INTERNAL MEDICINE

## 2023-12-04 PROCEDURE — G8427 DOCREV CUR MEDS BY ELIG CLIN: HCPCS | Performed by: INTERNAL MEDICINE

## 2023-12-04 PROCEDURE — 3078F DIAST BP <80 MM HG: CPT | Performed by: INTERNAL MEDICINE

## 2023-12-04 PROCEDURE — G8484 FLU IMMUNIZE NO ADMIN: HCPCS | Performed by: INTERNAL MEDICINE

## 2023-12-04 PROCEDURE — 99214 OFFICE O/P EST MOD 30 MIN: CPT | Performed by: INTERNAL MEDICINE

## 2023-12-04 RX ORDER — INSULIN GLARGINE 100 [IU]/ML
INJECTION, SOLUTION SUBCUTANEOUS
Qty: 15 ML | Refills: 5 | Status: SHIPPED | OUTPATIENT
Start: 2023-12-04

## 2023-12-04 RX ORDER — LISINOPRIL 5 MG/1
TABLET ORAL
Qty: 90 TABLET | Refills: 1 | Status: SHIPPED | OUTPATIENT
Start: 2023-12-04

## 2023-12-04 RX ORDER — CALCIUM CITRATE/VITAMIN D3 200MG-6.25
TABLET ORAL
Qty: 100 EACH | Refills: 3 | Status: SHIPPED | OUTPATIENT
Start: 2023-12-04

## 2023-12-04 NOTE — PROGRESS NOTES
Seen as patient for diabetes      Interim:    No issues  Stable glucose    Stopped trulicity due to diarrhea    Diagnosed with Type 2 diabetes mellitus in    Known diabetic complications: none   Uncontrolled, moderate    Current diabetic medications     Lantus 48 units  Metformin 500mg BID  Jardiance    States has not been on any other medication    Last A1c 6.5%<---- 6.7%<----7.8%<---- 8.2%<-----  7.4%<-----7.8%<---- 10.8%<----- 9.5%<--- 11.9% <---- 7.1%    Prior visit with dietician: no  Current diet: on average, 3 meals per day  Drinks regular soda cut down from 6 to 0/day  Current exercise: walking   Current monitoring regimen: home blood tests -1  times daily     Has brought blood glucose log/meter: yes  Home blood sugar records:   Any episodes of hypoglycemia? ----  Worsened by high CHO    No Hx of CAD , PVD, CVA    Hyperlipidemia:   For   Years  Takes lipitor 40mg zetia  Controlled      Last eye exam: 10/23  Last foot exam:   Last microalbumin to creatinine ratio: 213 on ---> 120 on   Lisinopril 5mg    She is on levothyroxine, stable    She is Moni's aunt    Past Medical History:   Diagnosis Date    Anxiety     Bipolar disorder ([address])     Brain aneurysm     Zucarello    Closed angle glaucoma     COPD (chronic obstructive pulmonary disease) ([address])     Hyperlipidemia     Hypertension     Hypothyroidism     Migraines     Open-angle glaucoma of both eyes     RLS (restless legs syndrome)     Rotator cuff disorder     right shoulder    TIA (transient ischemic attack) 11    Tobacco use disorder     Type II or unspecified type diabetes mellitus without mention of complication, not stated as uncontrolled     diet controlled     Past Surgical History:   Procedure Laterality Date    BLADDER SUSPENSION      CARPAL TUNNEL RELEASE Bilateral     right and left     SECTION      x 2    CHOLECYSTECTOMY      COLONOSCOPY      COLONOSCOPY  2016    diverticulosis    CYST REMOVAL

## 2023-12-05 ENCOUNTER — HOSPITAL ENCOUNTER (OUTPATIENT)
Age: 63
Discharge: HOME | End: 2023-12-05
Payer: COMMERCIAL

## 2023-12-05 LAB
CREAT UR-MCNC: 39.6 MG/DL (ref 28–259)
MICROALBUMIN UR DL<=1MG/L-MCNC: <1.2 MG/DL
MICROALBUMIN/CREAT UR: NORMAL MG/G (ref 0–30)

## 2023-12-05 PROCEDURE — 82043 UR ALBUMIN QUANTITATIVE: CPT

## 2023-12-05 PROCEDURE — 82570 ASSAY OF URINE CREATININE: CPT

## 2023-12-12 ENCOUNTER — HOSPITAL ENCOUNTER (OUTPATIENT)
Dept: WOMENS IMAGING | Age: 63
Discharge: HOME | End: 2023-12-12
Payer: COMMERCIAL

## 2023-12-12 VITALS — WEIGHT: 160 LBS | HEIGHT: 60 IN | BODY MASS INDEX: 31.41 KG/M2

## 2023-12-12 DIAGNOSIS — Z12.31 SCREENING MAMMOGRAM FOR BREAST CANCER: ICD-10-CM

## 2023-12-12 PROCEDURE — 77063 BREAST TOMOSYNTHESIS BI: CPT

## 2023-12-14 RX ORDER — OMEPRAZOLE 40 MG/1
40 CAPSULE, DELAYED RELEASE ORAL 2 TIMES DAILY
Qty: 60 CAPSULE | Refills: 0 | Status: SHIPPED | OUTPATIENT
Start: 2023-12-14

## 2023-12-31 DIAGNOSIS — E03.9 HYPOTHYROIDISM, UNSPECIFIED TYPE: ICD-10-CM

## 2024-01-01 RX ORDER — LEVOTHYROXINE SODIUM 0.1 MG/1
100 TABLET ORAL DAILY
Qty: 30 TABLET | Refills: 3 | Status: SHIPPED | OUTPATIENT
Start: 2024-01-01

## 2024-01-05 RX ORDER — FLUTICASONE PROPIONATE AND SALMETEROL XINAFOATE 115; 21 UG/1; UG/1
AEROSOL, METERED RESPIRATORY (INHALATION)
Qty: 12 G | Refills: 5 | Status: SHIPPED | OUTPATIENT
Start: 2024-01-05

## 2024-01-05 NOTE — TELEPHONE ENCOUNTER
Refill Request     CONFIRM preferrred pharmacy with the patient.    If Mail Order Rx - Pend for 90 day refill.      Last Seen: Last Seen Department: 8/30/2023  Last Seen by PCP: 8/30/2023    Last Written: 03/08/2023    If no future appointment scheduled, route STAFF MESSAGE with patient name to the  Pool for scheduling.      Next Appointment:   Future Appointments   Date Time Provider Department Center   3/6/2024  9:00 AM Samm Barlow MD [facility]   4/16/2024  9:50 AM Carlos Chaudhry MD [facility]       Message sent to  to schedule appt with patient?  NO      Requested Prescriptions     Pending Prescriptions Disp Refills    fluticasone-salmeterol (ADVAIR HFA) 115-21 MCG/ACT inhaler [Pharmacy Med Name: ADVAIR /21MCG ORALINH 120S] 12 g 5     Sig: INHALE 2 PUFFS BY MOUTH TWICE DAILY      IMAGING:    LDCT 8/28/23  2 mm nodule in the left lung apex, image 19, stable.  Calcified granulomata    Other incidentals:  Moderate atherosclerotic calcification of the coronary  arteries.     Echo 2015  - Left ventricle: The cavity size was normal. Wall thickness  was normal. Systolic function was normal. The estimated  ejection fraction was in the range of 60% to 65%. Wall  motion was normal; there were no regional wall motion  abnormalities. Left ventricular diastolic function  parameters were normal. Pulmonary arteries: PA peak  pressure: 37mm Hg (S).        ASSESSMENT AND PLAN:  Pulmonary Centrilobular Emphysema    Advair 115/21 BID (abdominal cramps not better off salmeterol), albuterol MDI and duonebs; intolerant of Spiriva due to sensation of swelling in throat.     Restless legs syndrome:   Requip 2 mg at bedtime and up to 2 mg earlier in day.  She is doing well and is stable   Depression: treated at [facility] in [place], has been long term treated with Venlafaxine   Tobacco abuse -  discussed importance of quitting         Greater than 44 pack year history.  Pre-contemplative

## 2024-01-19 RX ORDER — OMEPRAZOLE 40 MG/1
40 CAPSULE, DELAYED RELEASE ORAL 2 TIMES DAILY
Qty: 60 CAPSULE | Refills: 0 | Status: SHIPPED | OUTPATIENT
Start: 2024-01-19

## 2024-01-19 NOTE — TELEPHONE ENCOUNTER
Last Office Visit  -  1/17/23  Next Office Visit  -  1/31/24    Last Filled  -  12/14/23  Last UDS -    Contract -

## 2024-01-29 DIAGNOSIS — E78.5 DYSLIPIDEMIA: ICD-10-CM

## 2024-01-29 RX ORDER — EZETIMIBE 10 MG/1
10 TABLET ORAL DAILY
Qty: 30 TABLET | Refills: 5 | Status: SHIPPED | OUTPATIENT
Start: 2024-01-29

## 2024-01-29 NOTE — TELEPHONE ENCOUNTER
Last Office Visit  -  1/17/23  Next Office Visit  -  1/31/24    Last Filled  -  8/13/23  Last UDS -    Contract -

## 2024-01-31 ENCOUNTER — OFFICE VISIT (OUTPATIENT)
Dept: FAMILY MEDICINE CLINIC | Age: 64
End: 2024-01-31
Payer: COMMERCIAL

## 2024-01-31 VITALS
DIASTOLIC BLOOD PRESSURE: 50 MMHG | OXYGEN SATURATION: 97 % | BODY MASS INDEX: 31.8 KG/M2 | HEART RATE: 112 BPM | WEIGHT: 162 LBS | HEIGHT: 60 IN | SYSTOLIC BLOOD PRESSURE: 130 MMHG

## 2024-01-31 DIAGNOSIS — Z23 NEED FOR SHINGLES VACCINE: ICD-10-CM

## 2024-01-31 DIAGNOSIS — Z00.00 ENCOUNTER FOR WELL ADULT EXAM WITHOUT ABNORMAL FINDINGS: Primary | ICD-10-CM

## 2024-01-31 DIAGNOSIS — J43.2 CENTRILOBULAR EMPHYSEMA (HCC): ICD-10-CM

## 2024-01-31 DIAGNOSIS — E11.00 UNCONTROLLED TYPE 2 DIABETES MELLITUS WITH HYPEROSMOLARITY WITHOUT COMA, WITHOUT LONG-TERM CURRENT USE OF INSULIN (HCC): ICD-10-CM

## 2024-01-31 DIAGNOSIS — Z23 NEED FOR PNEUMOCOCCAL 20-VALENT CONJUGATE VACCINATION: ICD-10-CM

## 2024-01-31 LAB
ALBUMIN SERPL-MCNC: 4.7 G/DL (ref 3.4–5)
ALBUMIN/GLOB SERPL: 2 {RATIO} (ref 1.1–2.2)
ALP SERPL-CCNC: 103 U/L (ref 40–129)
ALT SERPL-CCNC: 22 U/L (ref 10–40)
ANION GAP SERPL CALCULATED.3IONS-SCNC: 14 MMOL/L (ref 3–16)
AST SERPL-CCNC: 19 U/L (ref 15–37)
BILIRUB SERPL-MCNC: <0.2 MG/DL (ref 0–1)
BUN SERPL-MCNC: 13 MG/DL (ref 7–20)
CALCIUM SERPL-MCNC: 10 MG/DL (ref 8.3–10.6)
CHLORIDE SERPL-SCNC: 100 MMOL/L (ref 99–110)
CHOLEST SERPL-MCNC: 258 MG/DL (ref 0–199)
CO2 SERPL-SCNC: 22 MMOL/L (ref 21–32)
CREAT SERPL-MCNC: 0.8 MG/DL (ref 0.6–1.2)
GFR SERPLBLD CREATININE-BSD FMLA CKD-EPI: >60 ML/MIN/{1.73_M2}
GLUCOSE SERPL-MCNC: 150 MG/DL (ref 70–99)
HDLC SERPL-MCNC: 67 MG/DL (ref 40–60)
LDLC SERPL CALC-MCNC: ABNORMAL MG/DL
LDLC SERPL-MCNC: 152 MG/DL
POTASSIUM SERPL-SCNC: 4.7 MMOL/L (ref 3.5–5.1)
PROT SERPL-MCNC: 7.1 G/DL (ref 6.4–8.2)
SODIUM SERPL-SCNC: 136 MMOL/L (ref 136–145)
TRIGL SERPL-MCNC: 370 MG/DL (ref 0–150)
VLDLC SERPL CALC-MCNC: ABNORMAL MG/DL

## 2024-01-31 PROCEDURE — 3075F SYST BP GE 130 - 139MM HG: CPT | Performed by: FAMILY MEDICINE

## 2024-01-31 PROCEDURE — 90750 HZV VACC RECOMBINANT IM: CPT | Performed by: FAMILY MEDICINE

## 2024-01-31 PROCEDURE — 90677 PCV20 VACCINE IM: CPT | Performed by: FAMILY MEDICINE

## 2024-01-31 PROCEDURE — 99396 PREV VISIT EST AGE 40-64: CPT | Performed by: FAMILY MEDICINE

## 2024-01-31 PROCEDURE — 90471 IMMUNIZATION ADMIN: CPT | Performed by: FAMILY MEDICINE

## 2024-01-31 PROCEDURE — 3078F DIAST BP <80 MM HG: CPT | Performed by: FAMILY MEDICINE

## 2024-01-31 PROCEDURE — 90472 IMMUNIZATION ADMIN EACH ADD: CPT | Performed by: FAMILY MEDICINE

## 2024-01-31 PROCEDURE — G8484 FLU IMMUNIZE NO ADMIN: HCPCS | Performed by: FAMILY MEDICINE

## 2024-01-31 ASSESSMENT — PATIENT HEALTH QUESTIONNAIRE - PHQ9
2. FEELING DOWN, DEPRESSED OR HOPELESS: 0
8. MOVING OR SPEAKING SO SLOWLY THAT OTHER PEOPLE COULD HAVE NOTICED. OR THE OPPOSITE, BEING SO FIGETY OR RESTLESS THAT YOU HAVE BEEN MOVING AROUND A LOT MORE THAN USUAL: 0
4. FEELING TIRED OR HAVING LITTLE ENERGY: 0
3. TROUBLE FALLING OR STAYING ASLEEP: 0
SUM OF ALL RESPONSES TO PHQ QUESTIONS 1-9: 0
9. THOUGHTS THAT YOU WOULD BE BETTER OFF DEAD, OR OF HURTING YOURSELF: 0
1. LITTLE INTEREST OR PLEASURE IN DOING THINGS: 0
7. TROUBLE CONCENTRATING ON THINGS, SUCH AS READING THE NEWSPAPER OR WATCHING TELEVISION: 0
5. POOR APPETITE OR OVEREATING: 0
SUM OF ALL RESPONSES TO PHQ9 QUESTIONS 1 & 2: 0
10. IF YOU CHECKED OFF ANY PROBLEMS, HOW DIFFICULT HAVE THESE PROBLEMS MADE IT FOR YOU TO DO YOUR WORK, TAKE CARE OF THINGS AT HOME, OR GET ALONG WITH OTHER PEOPLE: 0
SUM OF ALL RESPONSES TO PHQ QUESTIONS 1-9: 0

## 2024-01-31 NOTE — PROGRESS NOTES
Well Adult Note  Name: Justyna Villegas Today’s Date: 2024   MRN: [id number] Sex: Female   Age: 63 y.o. Ethnicity: Non- / Non    : 1960 Race: White (non-)      Justyna Villegas is here for well adult exam.  History:  63-year-old female presents for follow-up of diabetes mellitus and for complete physical exam.  She is having no complaints today.    Review of Systems   Constitutional:  Negative for fatigue and unexpected weight change.   HENT:  Negative for congestion, rhinorrhea and trouble swallowing.    Eyes:  Negative for pain and visual disturbance.   Respiratory:  Negative for chest tightness and shortness of breath.    Cardiovascular:  Negative for chest pain and palpitations.   Gastrointestinal:  Negative for constipation and diarrhea.   Endocrine: Negative for cold intolerance and heat intolerance.   Genitourinary:  Negative for frequency and urgency.   Musculoskeletal:  Negative for arthralgias and back pain.   Skin:  Negative for color change and rash.   Allergic/Immunologic: Negative for environmental allergies and food allergies.   Neurological:  Negative for dizziness and light-headedness.   Hematological:  Negative for adenopathy. Does not bruise/bleed easily.   Psychiatric/Behavioral:  Negative for dysphoric mood and sleep disturbance.        Allergies   Allergen Reactions    Budesonide Anaphylaxis and Swelling     Throat swelled    Tiotropium Bromide Monohydrate Anaphylaxis and Rash     Other reaction(s): Unknown  Other reaction(s): Unknown    Albuterol Swelling    Cymbalta [Duloxetine Hcl] Other (See Comments) and Swelling     Throat swelled  suicidal  Throat swelling    Duloxetine     Maxalt [Rizatriptan Benzoate]      Does not recall    Meloxicam Other (See Comments)     Unsure of Rx  crazy    Risperidone      Unsure of Rx    Rizatriptan Other (See Comments)     Pt unsure    Spiriva [Tiotropium Bromide Monohydrate] Swelling     Neck swelling    Tiotropium

## 2024-02-06 RX ORDER — PRAVASTATIN SODIUM 20 MG
20 TABLET ORAL DAILY
Qty: 90 TABLET | Refills: 1 | Status: SHIPPED | OUTPATIENT
Start: 2024-02-06

## 2024-02-26 RX ORDER — ROPINIROLE 2 MG/1
TABLET, FILM COATED ORAL
Qty: 60 TABLET | Refills: 5 | Status: SHIPPED | OUTPATIENT
Start: 2024-02-26

## 2024-02-26 NOTE — TELEPHONE ENCOUNTER
Refill Request     CONFIRM preferrred pharmacy with the patient.    If Mail Order Rx - Pend for 90 day refill.      Last Seen: Last Seen Department: 8/30/2023  Last Seen by PCP: Visit date not found    Last Written: 08/14/2023    If no future appointment scheduled, route STAFF MESSAGE with patient name to the  Pool for scheduling.      Next Appointment:   Future Appointments   Date Time Provider Department Center   3/6/2024  9:00 AM Samm Barlow MD [facility]   4/16/2024  9:50 AM Carlos Chaudhry MD [facility]   4/30/2024  8:00 AM Edin Carr MD [facility]       Message sent to  to schedule appt with patient?  NO      Requested Prescriptions     Pending Prescriptions Disp Refills    rOPINIRole (REQUIP) 2 MG tablet [Pharmacy Med Name: ROPINIROLE 2MG TABLETS] 60 tablet 5     Sig: TAKE 1 TABLET BY MOUTH TWICE DAILY      ASSESSMENT AND PLAN:  Pulmonary Centrilobular Emphysema    Advair 115/21 BID (abdominal cramps not better off salmeterol), albuterol MDI and duonebs; intolerant of Spiriva due to sensation of swelling in throat.     Restless legs syndrome:   Requip 2 mg at bedtime and up to 2 mg earlier in day.  She is doing well and is stable   Depression: treated at [facility] in [place], has been long term treated with Venlafaxine   Tobacco abuse -  discussed importance of quitting         Greater than 44 pack year history.  Pre-contemplative        We discussed chantix and wellbutrin today   H/O vocal cord polyp.  F/B Dr. La    See me in 6 months   LDCT for LCS Aug 2024, schedule at future visit

## 2024-03-06 ENCOUNTER — OFFICE VISIT (OUTPATIENT)
Dept: PULMONOLOGY | Age: 64
End: 2024-03-06

## 2024-03-06 VITALS
SYSTOLIC BLOOD PRESSURE: 132 MMHG | BODY MASS INDEX: 31.8 KG/M2 | OXYGEN SATURATION: 97 % | HEART RATE: 101 BPM | HEIGHT: 60 IN | RESPIRATION RATE: 18 BRPM | DIASTOLIC BLOOD PRESSURE: 58 MMHG | WEIGHT: 162 LBS

## 2024-03-06 DIAGNOSIS — G25.81 RLS (RESTLESS LEGS SYNDROME): ICD-10-CM

## 2024-03-06 DIAGNOSIS — Z87.891 PERSONAL HISTORY OF TOBACCO USE: Primary | ICD-10-CM

## 2024-03-06 DIAGNOSIS — J44.9 CHRONIC OBSTRUCTIVE PULMONARY DISEASE, UNSPECIFIED COPD TYPE (HCC): ICD-10-CM

## 2024-03-06 RX ORDER — ROPINIROLE 2 MG/1
TABLET, FILM COATED ORAL
Qty: 60 TABLET | Refills: 5 | Status: SHIPPED | OUTPATIENT
Start: 2024-03-06

## 2024-03-06 RX ORDER — FLUTICASONE PROPIONATE AND SALMETEROL XINAFOATE 115; 21 UG/1; UG/1
AEROSOL, METERED RESPIRATORY (INHALATION)
Qty: 12 G | Refills: 5 | Status: SHIPPED | OUTPATIENT
Start: 2024-03-06

## 2024-03-06 NOTE — PATIENT INSTRUCTIONS
follow-up, he or she will help you understand what to do next.  After a lung cancer screening, you can go back to your usual activities right away.  A lung cancer screening test can't tell if you have lung cancer. If your results are positive, your doctor can't tell whether an abnormal finding is a harmless nodule, cancer, or something else without doing more tests.  What can you do to help prevent lung cancer?  Some lung cancers can't be prevented. But if you smoke, quitting smoking is the best step you can take to prevent lung cancer. If you want to quit, your doctor can recommend medicines or other ways to help.  Follow-up care is a key part of your treatment and safety. Be sure to make and go to all appointments, and call your doctor if you are having problems. It's also a good idea to know your test results and keep a list of the medicines you take.  Where can you learn more?  Go to https://www.[employer].net/patientEd and enter Q940 to learn more about \"Learning About Lung Cancer Screening.\"  Current as of: February 28, 2023               Content Version: 13.9  © 4468-6495 [email or web address].   Care instructions adapted under license by [employer]. If you have questions about a medical condition or this instruction, always ask your healthcare professional. [employer] disclaims any warranty or liability for your use of this information.

## 2024-03-06 NOTE — PROGRESS NOTES
CHIEF COMPLAINT:  Restless legs syndrome     HPI:   Interval history:   COPD:   dyspnea on exertion with stairs, doing okay with advair.  Two sisters with emphysema, she is worried but cannot take cessation meds 2/2 therapist input; she doesn't really want to quit yet, she tells me, but she is clearly getting closer.  2 ppd.    RLS: uses Requip with benefit, 2 mg around 1 pm, 2 mg evening.     Dose not changed.  No compulsive behavior.  No sleep attacks.  She feels she is doing well.  Does drive but never when sleepy.       Presenting history:   The patient is a 53 yo woman with hx of tobacco abuse, brain aneurysm, DM presents with chronic dyspnea on exertion. Patient complains of shortness of breath. Symptoms include dyspnea on exertion. Symptoms began a few years ago, gradually worsening since that time. The dyspnea occurs with moderate activity. Patient denies hemoptysis . Aggravating factors include exertion, exercise and climbing stairs.The patient reports an exercise tolerance of approximately 3 blocks on the flat and 1 flight of stairs, limited primarily by dyspnea and leg pain. She smokes 1 ppd x 32 years. She has used spiriva and advair in the past without benefit. Currently using symbicort and albuterol with some response. Initially followed by Dr. Nguyen.     reports that she has been smoking cigarettes. She started smoking about 46 years ago. She has a 69.3 pack-year smoking history. She has been exposed to tobacco smoke. She has never used smokeless tobacco.    Objective:   Blood pressure (!) 132/58, pulse (!) 101, resp. rate 18, height 1.524 m (5'), weight 73.5 kg (162 lb), SpO2 97 %, not currently breastfeeding.  Constitutional:  No acute distress.   HENT:  Oropharynx is clear and moist.   Neck: No tracheal deviation present.   Cardiovascular: Normal heart sounds.  No lower extremity edema.  Pulmonary/Chest: No wheezes. No rhonchi. No rales. + decreased breath sounds.  No accessory muscle usage or

## 2024-03-11 RX ORDER — OMEPRAZOLE 40 MG/1
40 CAPSULE, DELAYED RELEASE ORAL 2 TIMES DAILY
Qty: 60 CAPSULE | Refills: 0 | Status: SHIPPED | OUTPATIENT
Start: 2024-03-11

## 2024-04-07 RX ORDER — OMEPRAZOLE 40 MG/1
40 CAPSULE, DELAYED RELEASE ORAL 2 TIMES DAILY
Qty: 60 CAPSULE | Refills: 0 | Status: SHIPPED | OUTPATIENT
Start: 2024-04-07

## 2024-04-16 ENCOUNTER — OFFICE VISIT (OUTPATIENT)
Dept: ENDOCRINOLOGY | Age: 64
End: 2024-04-16
Payer: COMMERCIAL

## 2024-04-16 VITALS
WEIGHT: 167 LBS | HEIGHT: 60 IN | HEART RATE: 91 BPM | BODY MASS INDEX: 32.79 KG/M2 | OXYGEN SATURATION: 98 % | SYSTOLIC BLOOD PRESSURE: 142 MMHG | DIASTOLIC BLOOD PRESSURE: 66 MMHG | RESPIRATION RATE: 16 BRPM

## 2024-04-16 DIAGNOSIS — Z79.4 CONTROLLED TYPE 2 DIABETES MELLITUS WITHOUT COMPLICATION, WITH LONG-TERM CURRENT USE OF INSULIN (HCC): ICD-10-CM

## 2024-04-16 DIAGNOSIS — R80.9 MICROALBUMINURIA: Primary | ICD-10-CM

## 2024-04-16 DIAGNOSIS — E11.9 CONTROLLED TYPE 2 DIABETES MELLITUS WITHOUT COMPLICATION, WITH LONG-TERM CURRENT USE OF INSULIN (HCC): ICD-10-CM

## 2024-04-16 LAB — HBA1C MFR BLD: 7 %

## 2024-04-16 PROCEDURE — 4004F PT TOBACCO SCREEN RCVD TLK: CPT | Performed by: INTERNAL MEDICINE

## 2024-04-16 PROCEDURE — G8427 DOCREV CUR MEDS BY ELIG CLIN: HCPCS | Performed by: INTERNAL MEDICINE

## 2024-04-16 PROCEDURE — 99214 OFFICE O/P EST MOD 30 MIN: CPT | Performed by: INTERNAL MEDICINE

## 2024-04-16 PROCEDURE — 83036 HEMOGLOBIN GLYCOSYLATED A1C: CPT | Performed by: INTERNAL MEDICINE

## 2024-04-16 PROCEDURE — 3046F HEMOGLOBIN A1C LEVEL >9.0%: CPT | Performed by: INTERNAL MEDICINE

## 2024-04-16 PROCEDURE — 3017F COLORECTAL CA SCREEN DOC REV: CPT | Performed by: INTERNAL MEDICINE

## 2024-04-16 PROCEDURE — 3077F SYST BP >= 140 MM HG: CPT | Performed by: INTERNAL MEDICINE

## 2024-04-16 PROCEDURE — G8417 CALC BMI ABV UP PARAM F/U: HCPCS | Performed by: INTERNAL MEDICINE

## 2024-04-16 PROCEDURE — G2211 COMPLEX E/M VISIT ADD ON: HCPCS | Performed by: INTERNAL MEDICINE

## 2024-04-16 PROCEDURE — 2022F DILAT RTA XM EVC RTNOPTHY: CPT | Performed by: INTERNAL MEDICINE

## 2024-04-16 PROCEDURE — 3078F DIAST BP <80 MM HG: CPT | Performed by: INTERNAL MEDICINE

## 2024-04-16 RX ORDER — BLOOD-GLUCOSE METER
1 KIT MISCELLANEOUS DAILY
Qty: 100 STRIP | Refills: 0 | Status: SHIPPED | OUTPATIENT
Start: 2024-04-16

## 2024-04-16 RX ORDER — INSULIN GLARGINE 100 [IU]/ML
INJECTION, SOLUTION SUBCUTANEOUS
Qty: 15 ML | Refills: 5 | Status: SHIPPED | OUTPATIENT
Start: 2024-04-16

## 2024-04-16 RX ORDER — LISINOPRIL 5 MG/1
TABLET ORAL
Qty: 90 TABLET | Refills: 1 | Status: SHIPPED | OUTPATIENT
Start: 2024-04-16

## 2024-04-16 NOTE — PROGRESS NOTES
Seen as patient for diabetes      Interim:    No issues  Stable glucose  Drinks some regular soda    Stopped trulicity due to diarrhea    Diagnosed with Type 2 diabetes mellitus in    Known diabetic complications: none   Uncontrolled, moderate    Current diabetic medications     Lantus 48 units  Metformin 500mg BID  Jardiance    States has not been on any other medication    Last A1c 7%<----6.5%<---- 6.7%<----7.8%<---- 8.2%<-----  7.4%<-----7.8%<---- 10.8%<----- 9.5%<--- 11.9% <---- 7.1%    Prior visit with dietician: no  Current diet: on average, 3 meals per day  Drinks regular soda cut down from 6 to 0/day  Current exercise: walking   Current monitoring regimen: home blood tests -1  times per week    Has brought blood glucose log/meter: yes  Home blood sugar records: 100s  Any episodes of hypoglycemia? ----  Worsened by high CHO    No Hx of CAD , PVD, CVA    Hyperlipidemia:   For   Years  Takes lipitor 40mg zetia  Controlled      Last eye exam: 10/23  Last foot exam:   She has microalbuminuria  Last microalbumin to creatinine ratio: 213 on ---> 120 on --->   Lisinopril 5mg    She is on levothyroxine, stable    She is Moni's aunt    Past Medical History:   Diagnosis Date    Anxiety     Bipolar disorder ([facility])     Brain aneurysm     Zucarello    Closed angle glaucoma     COPD (chronic obstructive pulmonary disease) ([facility])     Hyperlipidemia     Hypertension     Hypothyroidism     Migraines     Open-angle glaucoma of both eyes     RLS (restless legs syndrome)     Rotator cuff disorder     right shoulder    TIA (transient ischemic attack) 11    Tobacco use disorder     Type II or unspecified type diabetes mellitus without mention of complication, not stated as uncontrolled     diet controlled     Past Surgical History:   Procedure Laterality Date    BLADDER SUSPENSION      CARPAL TUNNEL RELEASE Bilateral     right and left     SECTION      x 2    CHOLECYSTECTOMY      COLONOSCOPY

## 2024-04-17 ENCOUNTER — TELEPHONE (OUTPATIENT)
Dept: ENDOCRINOLOGY | Age: 64
End: 2024-04-17

## 2024-04-17 NOTE — TELEPHONE ENCOUNTER
Submitted PA for Freestyle test strips  [facility] Key: [id number] STATUS: PENDING.    Follow up done daily; if no decision with in three days we will refax.  If another three days goes by with no decision will call the insurance for status.

## 2024-04-18 RX ORDER — BLOOD-GLUCOSE METER
EACH MISCELLANEOUS
Qty: 1 KIT | Refills: 0 | Status: SHIPPED | OUTPATIENT
Start: 2024-04-18

## 2024-04-18 RX ORDER — BLOOD SUGAR DIAGNOSTIC
1 STRIP MISCELLANEOUS DAILY
Qty: 100 EACH | Refills: 3 | Status: SHIPPED | OUTPATIENT
Start: 2024-04-18

## 2024-04-22 DIAGNOSIS — E03.9 HYPOTHYROIDISM, UNSPECIFIED TYPE: ICD-10-CM

## 2024-04-22 RX ORDER — LEVOTHYROXINE SODIUM 0.1 MG/1
100 TABLET ORAL DAILY
Qty: 30 TABLET | Refills: 3 | Status: SHIPPED | OUTPATIENT
Start: 2024-04-22

## 2024-04-22 NOTE — TELEPHONE ENCOUNTER
Last Office Visit  -  1/31/24  Next Office Visit  -  4/30/24    Last Filled  -    Last UDS -    Contract -

## 2024-04-30 ENCOUNTER — OFFICE VISIT (OUTPATIENT)
Dept: FAMILY MEDICINE CLINIC | Age: 64
End: 2024-04-30

## 2024-04-30 VITALS
WEIGHT: 163 LBS | DIASTOLIC BLOOD PRESSURE: 62 MMHG | HEIGHT: 60 IN | OXYGEN SATURATION: 99 % | SYSTOLIC BLOOD PRESSURE: 136 MMHG | BODY MASS INDEX: 32 KG/M2 | HEART RATE: 88 BPM

## 2024-04-30 DIAGNOSIS — J43.2 CENTRILOBULAR EMPHYSEMA (HCC): ICD-10-CM

## 2024-04-30 DIAGNOSIS — E11.00 UNCONTROLLED TYPE 2 DIABETES MELLITUS WITH HYPEROSMOLARITY WITHOUT COMA, WITHOUT LONG-TERM CURRENT USE OF INSULIN (HCC): Primary | ICD-10-CM

## 2024-04-30 DIAGNOSIS — E03.9 HYPOTHYROIDISM, UNSPECIFIED TYPE: ICD-10-CM

## 2024-04-30 DIAGNOSIS — Z23 NEED FOR SHINGLES VACCINE: ICD-10-CM

## 2024-04-30 LAB — HBA1C MFR BLD: 6.6 %

## 2024-04-30 RX ORDER — ZOSTER VACCINE RECOMBINANT, ADJUVANTED 50 MCG/0.5
0.5 KIT INTRAMUSCULAR SEE ADMIN INSTRUCTIONS
Qty: 0.5 ML | Refills: 0 | Status: SHIPPED | OUTPATIENT
Start: 2024-04-30 | End: 2024-10-27

## 2024-04-30 RX ORDER — ZOSTER VACCINE RECOMBINANT, ADJUVANTED 50 MCG/0.5
0.5 KIT INTRAMUSCULAR SEE ADMIN INSTRUCTIONS
Qty: 0.5 ML | Refills: 0 | Status: CANCELLED | OUTPATIENT
Start: 2024-04-30 | End: 2024-10-27

## 2024-05-01 DIAGNOSIS — E11.00 UNCONTROLLED TYPE 2 DIABETES MELLITUS WITH HYPEROSMOLARITY WITHOUT COMA, WITHOUT LONG-TERM CURRENT USE OF INSULIN (HCC): ICD-10-CM

## 2024-05-01 RX ORDER — PRAVASTATIN SODIUM 20 MG
20 TABLET ORAL DAILY
Qty: 90 TABLET | Refills: 1 | Status: SHIPPED | OUTPATIENT
Start: 2024-05-01

## 2024-05-01 NOTE — TELEPHONE ENCOUNTER
Last Office Visit  -  4/30/24  Next Office Visit  -  8/28/24    Last Filled  -  2/6/24  Last UDS -    Contract -

## 2024-05-01 NOTE — TELEPHONE ENCOUNTER
Pharmacy requesting rx of metFORMIN (GLUCOPHAGE-XR) 500 MG extended release tablet, #60      Last Office Visit  -  4/30/2024    Next Office Visit  -  8/28/2024

## 2024-05-02 RX ORDER — METFORMIN HYDROCHLORIDE 500 MG/1
TABLET, EXTENDED RELEASE ORAL
Qty: 60 TABLET | Refills: 5 | Status: SHIPPED | OUTPATIENT
Start: 2024-05-02

## 2024-05-03 RX ORDER — OMEPRAZOLE 40 MG/1
40 CAPSULE, DELAYED RELEASE ORAL 2 TIMES DAILY
Qty: 60 CAPSULE | Refills: 0 | Status: SHIPPED | OUTPATIENT
Start: 2024-05-03

## 2024-05-03 NOTE — TELEPHONE ENCOUNTER
Last Office Visit  -  4/30/24  Next Office Visit  -  8/28/24    Last Filled  -    Last UDS -    Contract -

## 2024-06-10 RX ORDER — OMEPRAZOLE 40 MG/1
40 CAPSULE, DELAYED RELEASE ORAL 2 TIMES DAILY
Qty: 60 CAPSULE | Refills: 0 | Status: SHIPPED | OUTPATIENT
Start: 2024-06-10

## 2024-07-07 RX ORDER — OMEPRAZOLE 40 MG/1
40 CAPSULE, DELAYED RELEASE ORAL 2 TIMES DAILY
Qty: 60 CAPSULE | Refills: 5 | Status: SHIPPED | OUTPATIENT
Start: 2024-07-07

## 2024-07-13 DIAGNOSIS — E78.5 DYSLIPIDEMIA: ICD-10-CM

## 2024-07-14 RX ORDER — EZETIMIBE 10 MG/1
10 TABLET ORAL DAILY
Qty: 30 TABLET | Refills: 5 | Status: SHIPPED | OUTPATIENT
Start: 2024-07-14

## 2024-08-06 DIAGNOSIS — Z79.4 CONTROLLED TYPE 2 DIABETES MELLITUS WITHOUT COMPLICATION, WITH LONG-TERM CURRENT USE OF INSULIN (HCC): ICD-10-CM

## 2024-08-06 DIAGNOSIS — E11.9 CONTROLLED TYPE 2 DIABETES MELLITUS WITHOUT COMPLICATION, WITH LONG-TERM CURRENT USE OF INSULIN (HCC): ICD-10-CM

## 2024-08-07 RX ORDER — INSULIN GLARGINE 100 [IU]/ML
INJECTION, SOLUTION SUBCUTANEOUS
Qty: 15 ML | Refills: 5 | Status: SHIPPED | OUTPATIENT
Start: 2024-08-07

## 2024-08-07 NOTE — TELEPHONE ENCOUNTER
Medication:   Requested Prescriptions     Signed Prescriptions Disp Refills    insulin glargine (LANTUS SOLOSTAR) 100 UNIT/ML injection pen 15 mL 5     Sig: ADMINISTER 48 UNITS UNDER THE SKIN EVERY DAY     Authorizing Provider: JILL BAUM     Ordering User: LAURA DEL ROSARIO       Last Filled:      Patient Phone Number: [phone] (home)     Last appt: 4/16/2024   Next appt: 8/21/2024    Last Labs DM:   Lab Results   Component Value Date/Time    LABA1C 6.6 04/30/2024 08:03 AM

## 2024-08-13 ENCOUNTER — HOSPITAL ENCOUNTER (OUTPATIENT)
Dept: CT IMAGING | Age: 64
Discharge: HOME | End: 2024-08-13
Payer: COMMERCIAL

## 2024-08-13 DIAGNOSIS — R91.1 LUNG NODULE: ICD-10-CM

## 2024-08-13 PROCEDURE — 71271 CT THORAX LUNG CANCER SCR C-: CPT

## 2024-08-14 DIAGNOSIS — E03.9 HYPOTHYROIDISM, UNSPECIFIED TYPE: ICD-10-CM

## 2024-08-14 RX ORDER — LEVOTHYROXINE SODIUM 0.1 MG/1
100 TABLET ORAL DAILY
Qty: 30 TABLET | Refills: 3 | Status: SHIPPED | OUTPATIENT
Start: 2024-08-14

## 2024-08-21 ENCOUNTER — OFFICE VISIT (OUTPATIENT)
Dept: ENDOCRINOLOGY | Age: 64
End: 2024-08-21
Payer: COMMERCIAL

## 2024-08-21 VITALS
SYSTOLIC BLOOD PRESSURE: 132 MMHG | OXYGEN SATURATION: 99 % | BODY MASS INDEX: 32.2 KG/M2 | HEIGHT: 60 IN | RESPIRATION RATE: 15 BRPM | WEIGHT: 164 LBS | DIASTOLIC BLOOD PRESSURE: 66 MMHG | HEART RATE: 78 BPM

## 2024-08-21 DIAGNOSIS — Z79.4 CONTROLLED TYPE 2 DIABETES MELLITUS WITHOUT COMPLICATION, WITH LONG-TERM CURRENT USE OF INSULIN (HCC): ICD-10-CM

## 2024-08-21 DIAGNOSIS — E11.9 CONTROLLED TYPE 2 DIABETES MELLITUS WITHOUT COMPLICATION, WITH LONG-TERM CURRENT USE OF INSULIN (HCC): ICD-10-CM

## 2024-08-21 DIAGNOSIS — E03.9 ACQUIRED HYPOTHYROIDISM: Primary | ICD-10-CM

## 2024-08-21 LAB — HBA1C MFR BLD: 7.5 %

## 2024-08-21 PROCEDURE — G8417 CALC BMI ABV UP PARAM F/U: HCPCS | Performed by: INTERNAL MEDICINE

## 2024-08-21 PROCEDURE — 2022F DILAT RTA XM EVC RTNOPTHY: CPT | Performed by: INTERNAL MEDICINE

## 2024-08-21 PROCEDURE — 3078F DIAST BP <80 MM HG: CPT | Performed by: INTERNAL MEDICINE

## 2024-08-21 PROCEDURE — 3044F HG A1C LEVEL LT 7.0%: CPT | Performed by: INTERNAL MEDICINE

## 2024-08-21 PROCEDURE — 3075F SYST BP GE 130 - 139MM HG: CPT | Performed by: INTERNAL MEDICINE

## 2024-08-21 PROCEDURE — 83036 HEMOGLOBIN GLYCOSYLATED A1C: CPT | Performed by: INTERNAL MEDICINE

## 2024-08-21 PROCEDURE — G8427 DOCREV CUR MEDS BY ELIG CLIN: HCPCS | Performed by: INTERNAL MEDICINE

## 2024-08-21 PROCEDURE — 99214 OFFICE O/P EST MOD 30 MIN: CPT | Performed by: INTERNAL MEDICINE

## 2024-08-21 PROCEDURE — 1036F TOBACCO NON-USER: CPT | Performed by: INTERNAL MEDICINE

## 2024-08-21 PROCEDURE — 3017F COLORECTAL CA SCREEN DOC REV: CPT | Performed by: INTERNAL MEDICINE

## 2024-08-21 RX ORDER — INSULIN GLARGINE 100 [IU]/ML
INJECTION, SOLUTION SUBCUTANEOUS
Qty: 15 ML | Refills: 5 | Status: SHIPPED | OUTPATIENT
Start: 2024-08-21

## 2024-08-21 RX ORDER — LANCETS 33 GAUGE
EACH MISCELLANEOUS
Qty: 100 EACH | Refills: 3 | Status: SHIPPED | OUTPATIENT
Start: 2024-08-21

## 2024-08-21 NOTE — PROGRESS NOTES
(H) 01/26/2021     Lab Results   Component Value Date    HDL 67 (H) 01/31/2024    HDL 86 (H) 10/04/2022    HDL 70 (H) 08/11/2022     No components found for: \"LDLCALC\"    No components found for: \"LABVLDL\"    Lab Results   Component Value Date    LABA1C 6.6 04/30/2024       Assessment:     Justyna Villegas is a 63 y.o. female with :    1.T2DM: Controlled, A1c high-now improved. Discussed goals, she cut down regular soda, referred to dietician. Advised may need rapid acting insulin, started GLP-1 agonist but did not tolerate. A1c is better  Added SGLT-2 inhibitor  lantus 48 units  Needs to bring log.   2.Hypothyroidism: TSH at goal  3.HLD: LDL at goal  4.Obesity  5.Microalbuminuria: Improved     Plan:      Lantus 48---> 52  units, advised self titration   continue metformin    jardiance   Advise to check blood sugar 2 times a day   Patient to send blood sugar log for titration.    Advise to low simple carbohydrate and protein with each  meal diet.    Diabetes Care: recommend yearly eye exam, foot exam and urine microalbumin to   creatinine ratio. Patient is up-to-date.

## 2024-08-28 ENCOUNTER — OFFICE VISIT (OUTPATIENT)
Dept: FAMILY MEDICINE CLINIC | Age: 64
End: 2024-08-28
Payer: COMMERCIAL

## 2024-08-28 VITALS
HEIGHT: 60 IN | SYSTOLIC BLOOD PRESSURE: 110 MMHG | DIASTOLIC BLOOD PRESSURE: 52 MMHG | BODY MASS INDEX: 32.2 KG/M2 | HEART RATE: 90 BPM | OXYGEN SATURATION: 97 % | WEIGHT: 164 LBS

## 2024-08-28 DIAGNOSIS — Z23 NEED FOR TDAP VACCINATION: Primary | ICD-10-CM

## 2024-08-28 DIAGNOSIS — Z79.4 TYPE 2 DIABETES MELLITUS WITH HYPERGLYCEMIA, WITH LONG-TERM CURRENT USE OF INSULIN (HCC): ICD-10-CM

## 2024-08-28 DIAGNOSIS — E03.9 HYPOTHYROIDISM, UNSPECIFIED TYPE: ICD-10-CM

## 2024-08-28 DIAGNOSIS — E11.65 TYPE 2 DIABETES MELLITUS WITH HYPERGLYCEMIA, WITH LONG-TERM CURRENT USE OF INSULIN (HCC): ICD-10-CM

## 2024-08-28 PROCEDURE — 3078F DIAST BP <80 MM HG: CPT | Performed by: FAMILY MEDICINE

## 2024-08-28 PROCEDURE — 3051F HG A1C>EQUAL 7.0%<8.0%: CPT | Performed by: FAMILY MEDICINE

## 2024-08-28 PROCEDURE — 90471 IMMUNIZATION ADMIN: CPT | Performed by: FAMILY MEDICINE

## 2024-08-28 PROCEDURE — 1036F TOBACCO NON-USER: CPT | Performed by: FAMILY MEDICINE

## 2024-08-28 PROCEDURE — 3074F SYST BP LT 130 MM HG: CPT | Performed by: FAMILY MEDICINE

## 2024-08-28 PROCEDURE — 99214 OFFICE O/P EST MOD 30 MIN: CPT | Performed by: FAMILY MEDICINE

## 2024-08-28 PROCEDURE — 3017F COLORECTAL CA SCREEN DOC REV: CPT | Performed by: FAMILY MEDICINE

## 2024-08-28 PROCEDURE — G8427 DOCREV CUR MEDS BY ELIG CLIN: HCPCS | Performed by: FAMILY MEDICINE

## 2024-08-28 PROCEDURE — 90715 TDAP VACCINE 7 YRS/> IM: CPT | Performed by: FAMILY MEDICINE

## 2024-08-28 PROCEDURE — G8417 CALC BMI ABV UP PARAM F/U: HCPCS | Performed by: FAMILY MEDICINE

## 2024-08-28 PROCEDURE — 2022F DILAT RTA XM EVC RTNOPTHY: CPT | Performed by: FAMILY MEDICINE

## 2024-08-28 SDOH — ECONOMIC STABILITY: FOOD INSECURITY: WITHIN THE PAST 12 MONTHS, YOU WORRIED THAT YOUR FOOD WOULD RUN OUT BEFORE YOU GOT MONEY TO BUY MORE.: SOMETIMES TRUE

## 2024-08-28 SDOH — ECONOMIC STABILITY: INCOME INSECURITY: HOW HARD IS IT FOR YOU TO PAY FOR THE VERY BASICS LIKE FOOD, HOUSING, MEDICAL CARE, AND HEATING?: SOMEWHAT HARD

## 2024-08-28 SDOH — ECONOMIC STABILITY: FOOD INSECURITY: WITHIN THE PAST 12 MONTHS, THE FOOD YOU BOUGHT JUST DIDN'T LAST AND YOU DIDN'T HAVE MONEY TO GET MORE.: SOMETIMES TRUE

## 2024-08-28 NOTE — ASSESSMENT & PLAN NOTE
Unclear control, continue current medications  Hemoglobin A1C   Date Value Ref Range Status   08/21/2024 7.5 % Final     Slightly elevated over time.     Continue insulin dosage and metformin.

## 2024-08-28 NOTE — PROGRESS NOTES
Justyna Villegas (:  1960) is a 63 y.o. female,Established patient, here for evaluation of the following chief complaint(s):  Diabetes         Assessment & Plan  Need for Tdap vaccination         Orders:    Tdap, BOOSTRIX, (age 10 yrs+), IM    Hypothyroidism, unspecified type   Well-controlled, continue current medications  Lab Results   Component Value Date    TSH 0.85 2024     Appears to be euthyroid at this time.     Continue current dose of Synthroid.  Type 2 diabetes mellitus with hyperglycemia, with long-term current use of insulin (HCC)   Unclear control, continue current medications  Hemoglobin A1C   Date Value Ref Range Status   2024 7.5 % Final     Slightly elevated over time.     Continue insulin dosage and metformin.    No follow-ups on file.       Subjective   Justyna Villegas is a 63 y.o. female. Patient presents with:  Diabetes      63-year-old female presents for follow-up of diabetes mellitus and hypothyroidism.  She has been taking her medication regularly.  Her blood sugars have been a little bit higher recently.  Patient denies any significant polyuria polyphagia or polydipsia.  Patient has been also feeling normal amount of energy and has had no weight changes recently.  Denies hot or cold intolerance.  The patients PMH, surgical history, family history, medications, allergies were all reviewed and updated as appropriate today.          Review of Systems       Objective   Physical Exam  Vitals and nursing note reviewed.   Constitutional:       Appearance: Normal appearance. She is well-developed.   HENT:      Head: Normocephalic and atraumatic.      Right Ear: External ear normal.      Left Ear: External ear normal.      Nose: Nose normal.   Eyes:      Conjunctiva/sclera: Conjunctivae normal.      Pupils: Pupils are equal, round, and reactive to light.   Cardiovascular:      Rate and Rhythm: Normal rate and regular rhythm.      Heart sounds: Normal heart sounds.

## 2024-08-30 NOTE — PROGRESS NOTES
small amounts and protein. Recommended taking Levothyroxine alone with water. Insulin management: Demonstration and patient teach back techniques for adequate insulin administration. Reviewed rational for and locations for injection site rotation.   Handouts provided:  Planning Healthy Meals-[employer], How to inject insulin- [employer]ADE and Sample menus-[employer]  NUTRITION MONITORING AND EVALUATION  3 meals  MyPlate targeting 30 gram carbohydrate meals  Increase water/decrease Pepsi       Patient Active Problem List   Diagnosis    TIA (transient ischemic attack)    Acquired hypothyroidism    Dyspnea    Tobacco abuse    Abnormal PFT    Centrilobular emphysema (HCC)    Excessive sleepiness    Abnormal finding on Pap smear, HPV DNA positive    Restless leg syndrome    Leg pain    Diabetic polyneuropathy (HCC)    Uncontrolled type 2 diabetes mellitus with hyperosmolarity without coma, without long-term current use of insulin (HCC)    Restless legs syndrome (RLS)    Low grade squamous intraepith lesion on cytologic smear vagina (lgsil)    Ovarian cyst, left    Fracture of coccyx (HCC)    Fracture of spinal vertebra    Hip pain    Cataract    Glaucoma    Viral upper respiratory tract infection    Migraine with aura and without status migrainosus, not intractable    Essential hypertension    Moderate episode of recurrent major depressive disorder (HCC)    Bilateral carotid artery disease (HCC)    Neck pain of over 3 months duration    New persistent daily headache    Cervical disc disorder of mid-cervical region    Headache, cervicogenic    HTN (hypertension), benign    Dysthymia    Uncontrolled type 2 diabetes mellitus with complication, without long-term current use of insulin (HCC)    Dyslipidemia    COPD (chronic obstructive pulmonary disease) (HCC)    Obesity    Displacement of lumbar intervertebral disc without myelopathy    Internal derangement of right knee    Lumbosacral spondylosis without myelopathy    Sesamoiditis    Synovitis    Valgus deformity of great toe       Current Outpatient Medications   Medication Sig Dispense Refill    levothyroxine (SYNTHROID) 100 MCG tablet TAKE ONE TABLET BY MOUTH ONCE DAILY  30 tablet 3    fluticasone (FLOVENT HFA) 220 MCG/ACT inhaler Inhale 1 puff into the lungs 2 times daily This inhaler replaces advair for one month 1 Inhaler 1    lisinopril (PRINIVIL;ZESTRIL) 5 MG tablet Take 1 tablet by mouth daily 90 tablet 1    Dulaglutide (TRULICITY) 8.49 AQ/0.8UX SOPN Inject 0.75 mg into the skin once a week 4 pen 2    insulin glargine (LANTUS SOLOSTAR) 100 UNIT/ML injection pen 26-36 units daily 5 pen 2    atorvastatin (LIPITOR) 40 MG tablet TAKE ONE (1) TABLET BY MOUTH DAILY  90 tablet 1    rOPINIRole (REQUIP) 2 MG tablet TAKE ONE TABLET BY MOUTH EVERY NIGHT AT BEDTIME AND IN THE EVENING AS NEEDED  60 tablet 5    ADVAIR -21 MCG/ACT inhaler INHALE ONE (1) PUFF INTO THE LUNGS TWO (2) TIMES DAILY  12 g 5    glucose monitoring kit (FREESTYLE) monitoring kit 1 kit by Does not apply route daily 1 kit 0    FreeStyle Lancets MISC 1 each by Does not apply route daily 100 each 3    blood glucose test strips (ASCENSIA AUTODISC VI;ONE TOUCH ULTRA TEST VI) strip 1 each by In Vitro route daily As needed.  100 each 3    methocarbamol (ROBAXIN) 750 MG tablet       fluorometholone (FML) 0.1 % ophthalmic suspension       fluticasone (FLONASE) 50 MCG/ACT nasal spray 2 sprays by Each Nostril route daily 1 Bottle 5    azelastine (ASTELIN) 0.1 % nasal spray 2 sprays by Nasal route 2 times daily Use in each nostril as directed 2 Bottle 5    loratadine (CLARITIN) 10 MG tablet TAKE ONE TABLET BY MOUTH ONCE DAILY  30 tablet 11    benzonatate (TESSALON) 100 MG capsule TAKE ONE (1) CAPSULE BY MOUTH THREE (3) TIMES DAILY AS NEEDED FOR COUGH  30 capsule 0     MG tablet TAKE ONE (1) TABLET BY MOUTH TWO (2) TIMES DAILY AS NEEDED FOR PAIN  60 tablet 5  omeprazole (PRILOSEC) 40 MG delayed release capsule TAKE ONE CAPSULE BY MOUTH TWICE A DAY  60 capsule 2    metFORMIN (GLUCOPHAGE-XR) 500 MG extended release tablet TAKE ONE (1) TABLET TWICE DAILY BY MOUTH  60 tablet 5    ezetimibe (ZETIA) 10 MG tablet TAKE ONE (1) TABLET BY MOUTH DAILY  30 tablet 5    ipratropium-albuterol (DUONEB) 0.5-2.5 (3) MG/3ML SOLN nebulizer solution INHALE THREE (3) MLS INTO THE LUNGS EVERY FOUR (4) HOURS AS NEEDED FOR SHORTNESS OF BREATH  360 mL 5    FreeStyle Lancets MISC USE ONCE DAILY  100 each 11    Insulin Pen Needle 32G X 4 MM MISC 1 each by Does not apply route daily 100 each 3    FREESTYLE LITE strip USE DAILY AS NEEDED  50 each 5    B Complex Vitamins (VITAMIN B COMPLEX PO) Take by mouth      Ascorbic Acid (KARTIK-C PO) Take by mouth      estradiol (ESTRACE) 1 MG tablet       albuterol (PROVENTIL) (2.5 MG/3ML) 0.083% nebulizer solution Take 3 mLs by nebulization every 4 hours as needed for Wheezing 375 mL 5    albuterol sulfate  (90 Base) MCG/ACT inhaler INHALE 2 PUFFS INTO THE LUNGS EVERY 4 HOURS AS NEEDED FOR WHEEZING 1 Inhaler 5    LATUDA 40 MG TABS tablet       FREESTYLE LITE strip USE DAILY AS NEEDED 100 strip 0    Menthol, Topical Analgesic, 4 % GEL Apply 1 Dose topically 3 times daily To the affected area- the neck 74 mL 3    HYDROcodone-acetaminophen (NORCO) 5-325 MG per tablet       venlafaxine (EFFEXOR XR) 150 MG extended release capsule       diazepam (VALIUM) 5 MG tablet Take 5 mg by mouth daily as needed for Anxiety      fluticasone (FLONASE) 50 MCG/ACT nasal spray 1 spray by Nasal route daily 1 Bottle 3    ESTROGENS CONJUGATED PO Take by mouth      venlafaxine (EFFEXOR-XR) 75 MG XR capsule   Take 150 mg by mouth See Admin Instructions 150mg in morning, 75mg at night      lamotrigine (LAMICTAL) 100 MG tablet Take 100 mg by mouth 2 times daily.  timolol (BETIMOL) 0.5 % ophthalmic solution 1 drop 2 times daily.       brimonidine (ALPHAGAN P) 0.15 % ophthalmic solution 1 drop 2 times daily. No current facility-administered medications for this visit. NUTRITION ASSESSMENT    Biochemical Data:    Lab Results   Component Value Date    LABA1C 10.8 08/03/2021     Lab Results   Component Value Date    .8 08/21/2019       Lab Results   Component Value Date    CHOL 139 01/26/2021    CHOL 307 (H) 09/21/2020    CHOL 225 (H) 05/12/2017     Lab Results   Component Value Date    TRIG 201 (H) 01/26/2021    TRIG 869 (H) 09/21/2020    TRIG 352 (H) 05/12/2017     Lab Results   Component Value Date    HDL 58 01/26/2021    HDL 49 09/21/2020    HDL 71 (H) 08/21/2019     Lab Results   Component Value Date    LDLCALC 41 01/26/2021    LDLCALC see below 09/21/2020    LDLCALC 110 (H) 08/21/2019     Lab Results   Component Value Date    LABVLDL 40 01/26/2021    LABVLDL see below 09/21/2020    LABVLDL 32 08/21/2019     No results found for: [facility]    Lab Results   Component Value Date    WBC 10.7 09/21/2020    HGB 11.1 (L) 09/21/2020    HCT 35.4 (L) 09/21/2020    MCV 71.0 (L) 09/21/2020     09/21/2020       Lab Results   Component Value Date    CREATININE 0.7 01/26/2021    BUN 12 01/26/2021     (L) 01/26/2021    K 4.6 01/26/2021    CL 98 (L) 01/26/2021    CO2 22 01/26/2021       Diabetes Medications: Yes  Knows name and dose of prescribed medications Yes  Knows prescribed schedule for medicationsYes  Recent change in medication type/dosage: Yes  Stores  medications properlyYes  Comments:     Monitoring:   Has BG meter: Yes  Testing frequency: 2  Recent results: -230, PM after eating 270-306  Hypoglycemia? No    Anthropometric Measurements:   Wt:   Wt Readings from Last 3 Encounters:   08/10/21 174 lb 9.6 oz (79.2 kg)   08/03/21 175 lb (79.4 kg)   05/27/21 176 lb (79.8 kg)      BMI:   BMI Readings from Last 3 Encounters:   08/10/21 34.10 kg/m²   08/03/21 34.18 kg/m²   05/27/21 34.37 kg/m²     Patient's stated goal weight: 7% Weight loss goal weight:     Physical Activity History:   Physical activity: none, low energy  Frequency of activity:   Duration of activity:   Obstacles to activity:     Sleep: poor, short durations, naps daily    Food and Nutrition History:   Nutrition Awareness/Previous DSMES: No  Number of people in household: 1  Frequency of Meals Eaten away from home:rarely  Food Availability Problems  Within the past 12 months, have you worried that your food would run out before you got money to buy more? No  Within the past 12 months, has the food you bought not lasted till the end of the month and you didn't have money to get more? No  Beverage consumption: water - gallon or more, Pepsi-5 cans, tea-not daily, milk-24 ounces  Alcohol consumption: No  Usual Food consumption:   2 meals, 5-15 gram carbohydrate meals, large amount meat     Barriers:   -none          Follow Up Plan: 3 months  Contact information given.     Referring Provider: Alejandra Harrison MD  Time spent with patient: 60 minutes [Follow-Up] : a follow-up visit [Pneumonia] : pneumonia

## 2024-09-04 DIAGNOSIS — D50.9 IRON DEFICIENCY ANEMIA, UNSPECIFIED IRON DEFICIENCY ANEMIA TYPE: ICD-10-CM

## 2024-09-05 RX ORDER — FERROUS GLUCONATE 324(38)MG
1 TABLET ORAL 2 TIMES DAILY
Qty: 60 TABLET | Refills: 3 | Status: SHIPPED | OUTPATIENT
Start: 2024-09-05

## 2024-09-24 ENCOUNTER — TELEPHONE (OUTPATIENT)
Dept: ENDOCRINOLOGY | Age: 64
End: 2024-09-24

## 2024-09-25 DIAGNOSIS — Z79.4 CONTROLLED TYPE 2 DIABETES MELLITUS WITHOUT COMPLICATION, WITH LONG-TERM CURRENT USE OF INSULIN (HCC): Primary | ICD-10-CM

## 2024-09-25 DIAGNOSIS — E11.9 CONTROLLED TYPE 2 DIABETES MELLITUS WITHOUT COMPLICATION, WITH LONG-TERM CURRENT USE OF INSULIN (HCC): Primary | ICD-10-CM

## 2024-09-25 RX ORDER — PEN NEEDLE, DIABETIC 32GX 5/32"
NEEDLE, DISPOSABLE MISCELLANEOUS
Qty: 100 EACH | Refills: 3 | Status: SHIPPED | OUTPATIENT
Start: 2024-09-25

## 2024-10-09 DIAGNOSIS — J44.9 CHRONIC OBSTRUCTIVE PULMONARY DISEASE, UNSPECIFIED COPD TYPE (HCC): Primary | ICD-10-CM

## 2024-10-09 RX ORDER — FLUTICASONE PROPIONATE AND SALMETEROL XINAFOATE 115; 21 UG/1; UG/1
AEROSOL, METERED RESPIRATORY (INHALATION)
Qty: 12 G | Refills: 5 | Status: SHIPPED | OUTPATIENT
Start: 2024-10-09

## 2024-10-17 RX ORDER — LISINOPRIL 5 MG/1
TABLET ORAL
Qty: 90 TABLET | Refills: 0 | Status: SHIPPED | OUTPATIENT
Start: 2024-10-17

## 2024-10-24 DIAGNOSIS — E11.00 UNCONTROLLED TYPE 2 DIABETES MELLITUS WITH HYPEROSMOLARITY WITHOUT COMA, WITHOUT LONG-TERM CURRENT USE OF INSULIN (HCC): ICD-10-CM

## 2024-10-24 RX ORDER — METFORMIN HYDROCHLORIDE 500 MG/1
TABLET, EXTENDED RELEASE ORAL
Qty: 60 TABLET | Refills: 5 | Status: SHIPPED | OUTPATIENT
Start: 2024-10-24

## 2024-10-24 NOTE — TELEPHONE ENCOUNTER
Last Office Visit  -  8/28/24  Next Office Visit  -  12/30/24    Last Filled  -  5/2/24  Last UDS -    Contract -

## 2024-11-06 RX ORDER — PRAVASTATIN SODIUM 40 MG
TABLET ORAL
Qty: 30 TABLET | Refills: 5 | Status: SHIPPED | OUTPATIENT
Start: 2024-11-06

## 2024-11-06 NOTE — TELEPHONE ENCOUNTER
Patient is requesting a rx for pravastatin (PRAVACHOL) 40 MG tablet.    Last seen 08/08/2024  Next visit 12/30/2024

## 2024-11-12 DIAGNOSIS — E03.9 HYPOTHYROIDISM, UNSPECIFIED TYPE: ICD-10-CM

## 2024-11-12 RX ORDER — LEVOTHYROXINE SODIUM 100 UG/1
100 TABLET ORAL DAILY
Qty: 30 TABLET | Refills: 3 | Status: SHIPPED | OUTPATIENT
Start: 2024-11-12

## 2024-11-12 NOTE — TELEPHONE ENCOUNTER
Last Office Visit  -  8/28/24  Next Office Visit  -  12/30/24    Last Filled  -  8/14/24  Last UDS -    Contract -

## 2024-11-18 ENCOUNTER — TELEPHONE (OUTPATIENT)
Dept: PULMONOLOGY | Age: 64
End: 2024-11-18

## 2024-11-18 NOTE — TELEPHONE ENCOUNTER
Patient cancelled appointment for Dr. Howe for Thursday 20th at 8:45 she did not want to reschedule at this time.

## 2024-11-19 ENCOUNTER — OFFICE VISIT (OUTPATIENT)
Dept: ENDOCRINOLOGY | Age: 64
End: 2024-11-19
Payer: COMMERCIAL

## 2024-11-19 VITALS
HEIGHT: 60 IN | DIASTOLIC BLOOD PRESSURE: 89 MMHG | BODY MASS INDEX: 32.98 KG/M2 | HEART RATE: 93 BPM | SYSTOLIC BLOOD PRESSURE: 167 MMHG | WEIGHT: 168 LBS

## 2024-11-19 DIAGNOSIS — M79.673 PAIN OF FOOT, UNSPECIFIED LATERALITY: ICD-10-CM

## 2024-11-19 DIAGNOSIS — E11.9 CONTROLLED TYPE 2 DIABETES MELLITUS WITHOUT COMPLICATION, WITH LONG-TERM CURRENT USE OF INSULIN (HCC): Primary | ICD-10-CM

## 2024-11-19 DIAGNOSIS — E03.9 ACQUIRED HYPOTHYROIDISM: ICD-10-CM

## 2024-11-19 DIAGNOSIS — Z79.4 CONTROLLED TYPE 2 DIABETES MELLITUS WITHOUT COMPLICATION, WITH LONG-TERM CURRENT USE OF INSULIN (HCC): Primary | ICD-10-CM

## 2024-11-19 LAB — HBA1C MFR BLD: 7.5 %

## 2024-11-19 PROCEDURE — G8427 DOCREV CUR MEDS BY ELIG CLIN: HCPCS | Performed by: INTERNAL MEDICINE

## 2024-11-19 PROCEDURE — G8417 CALC BMI ABV UP PARAM F/U: HCPCS | Performed by: INTERNAL MEDICINE

## 2024-11-19 PROCEDURE — G8484 FLU IMMUNIZE NO ADMIN: HCPCS | Performed by: INTERNAL MEDICINE

## 2024-11-19 PROCEDURE — 3079F DIAST BP 80-89 MM HG: CPT | Performed by: INTERNAL MEDICINE

## 2024-11-19 PROCEDURE — 3051F HG A1C>EQUAL 7.0%<8.0%: CPT | Performed by: INTERNAL MEDICINE

## 2024-11-19 PROCEDURE — 1036F TOBACCO NON-USER: CPT | Performed by: INTERNAL MEDICINE

## 2024-11-19 PROCEDURE — 83036 HEMOGLOBIN GLYCOSYLATED A1C: CPT | Performed by: INTERNAL MEDICINE

## 2024-11-19 PROCEDURE — 2022F DILAT RTA XM EVC RTNOPTHY: CPT | Performed by: INTERNAL MEDICINE

## 2024-11-19 PROCEDURE — 3017F COLORECTAL CA SCREEN DOC REV: CPT | Performed by: INTERNAL MEDICINE

## 2024-11-19 PROCEDURE — 99214 OFFICE O/P EST MOD 30 MIN: CPT | Performed by: INTERNAL MEDICINE

## 2024-11-19 PROCEDURE — 3077F SYST BP >= 140 MM HG: CPT | Performed by: INTERNAL MEDICINE

## 2024-11-19 RX ORDER — INSULIN GLARGINE 100 [IU]/ML
INJECTION, SOLUTION SUBCUTANEOUS
Qty: 15 ML | Refills: 5 | Status: SHIPPED | OUTPATIENT
Start: 2024-11-19 | End: 2024-11-19 | Stop reason: SDUPTHER

## 2024-11-19 RX ORDER — INSULIN GLARGINE 100 [IU]/ML
INJECTION, SOLUTION SUBCUTANEOUS
Qty: 30 ML | Refills: 5 | Status: SHIPPED | OUTPATIENT
Start: 2024-11-19

## 2024-11-19 NOTE — PROGRESS NOTES
Seen as patient for diabetes      Interim:    No issues  Stable glucose  C/o aches in bottom of feet when walking  Tingling but not at night    Trying to quit smoking    Stopped trulicity due to diarrhea    Diagnosed with Type 2 diabetes mellitus in    Known diabetic complications: none   controlled, moderate    Current diabetic medications     Lantus 58 units  Metformin 500mg BID  Jardiance    Last A1c 7.5%<----7%<----6.5%<---- 6.7%<----7.8%<---- 8.2%<-----  7.4%<-----7.8%<---- 10.8%<----- 9.5%<--- 11.9% <---- 7.1%    Prior visit with dietician: no  Current diet: on average, 3 meals per day  Drinks regular soda cut down from 6 to 0/day  Current exercise: walking   Current monitoring regimen: home blood tests -1  times per week    Has brought blood glucose log/meter:yes  Home blood sugar records: 132-204  Any episodes of hypoglycemia? ----  Worsened by high CHO    No Hx of CAD , PVD, CVA    Hyperlipidemia:   For   Years  Takes lipitor 40mg zetia  Controlled      Last eye exam: 10/23  Last foot exam:   She has microalbuminuria  Last microalbumin to creatinine ratio: 213 on ---> 120 on --->   Lisinopril 5mg    She is on levothyroxine, stable  Last TSH       Past Medical History:   Diagnosis Date    Anxiety     Bipolar disorder ([facility])     Brain aneurysm     Zucarello    Closed angle glaucoma     COPD (chronic obstructive pulmonary disease) (HCC)     Hyperlipidemia     Hypertension     Hypothyroidism     Migraines     Open-angle glaucoma of both eyes     RLS (restless legs syndrome)     Rotator cuff disorder     right shoulder    TIA (transient ischemic attack) 11    Tobacco use disorder     Type II or unspecified type diabetes mellitus without mention of complication, not stated as uncontrolled     diet controlled     Past Surgical History:   Procedure Laterality Date    BLADDER SUSPENSION      CARPAL TUNNEL RELEASE Bilateral     right and left     SECTION      x 2    CHOLECYSTECTOMY

## 2024-12-08 RX ORDER — OMEPRAZOLE 40 MG/1
40 CAPSULE, DELAYED RELEASE ORAL 2 TIMES DAILY
Qty: 60 CAPSULE | Refills: 5 | Status: SHIPPED | OUTPATIENT
Start: 2024-12-08

## 2024-12-13 ENCOUNTER — HOSPITAL ENCOUNTER (OUTPATIENT)
Dept: WOMENS IMAGING | Age: 64
Discharge: HOME | End: 2024-12-13
Payer: COMMERCIAL

## 2024-12-13 VITALS — HEIGHT: 60 IN | BODY MASS INDEX: 33.38 KG/M2 | WEIGHT: 170 LBS

## 2024-12-13 DIAGNOSIS — E78.5 DYSLIPIDEMIA: ICD-10-CM

## 2024-12-13 DIAGNOSIS — Z12.31 SCREENING MAMMOGRAM FOR BREAST CANCER: ICD-10-CM

## 2024-12-13 PROCEDURE — 77063 BREAST TOMOSYNTHESIS BI: CPT

## 2024-12-13 RX ORDER — EZETIMIBE 10 MG/1
10 TABLET ORAL DAILY
Qty: 30 TABLET | Refills: 5 | Status: SHIPPED | OUTPATIENT
Start: 2024-12-13

## 2024-12-13 NOTE — TELEPHONE ENCOUNTER
Last Office Visit  -  8/28/24  Next Office Visit  -  12/30/24    Last Filled  -  7/14/24  Last UDS -    Contract -

## 2024-12-23 DIAGNOSIS — E11.9 CONTROLLED TYPE 2 DIABETES MELLITUS WITHOUT COMPLICATION, WITH LONG-TERM CURRENT USE OF INSULIN (HCC): ICD-10-CM

## 2024-12-23 DIAGNOSIS — E11.9 CONTROLLED TYPE 2 DIABETES MELLITUS WITHOUT COMPLICATION, WITH LONG-TERM CURRENT USE OF INSULIN (HCC): Primary | ICD-10-CM

## 2024-12-23 DIAGNOSIS — Z79.4 CONTROLLED TYPE 2 DIABETES MELLITUS WITHOUT COMPLICATION, WITH LONG-TERM CURRENT USE OF INSULIN (HCC): Primary | ICD-10-CM

## 2024-12-23 DIAGNOSIS — Z79.4 CONTROLLED TYPE 2 DIABETES MELLITUS WITHOUT COMPLICATION, WITH LONG-TERM CURRENT USE OF INSULIN (HCC): ICD-10-CM

## 2024-12-23 RX ORDER — BLOOD SUGAR DIAGNOSTIC
1 STRIP MISCELLANEOUS DAILY
Qty: 100 EACH | Refills: 3 | Status: SHIPPED | OUTPATIENT
Start: 2024-12-23

## 2024-12-23 RX ORDER — INSULIN GLARGINE 100 [IU]/ML
INJECTION, SOLUTION SUBCUTANEOUS
Qty: 15 ML | Refills: 2 | Status: SHIPPED | OUTPATIENT
Start: 2024-12-23

## 2024-12-23 NOTE — TELEPHONE ENCOUNTER
Medication:   Requested Prescriptions     Pending Prescriptions Disp Refills    insulin glargine (LANTUS SOLOSTAR) 100 UNIT/ML injection pen [Pharmacy Med Name: LANTUS SOLOSTAR PEN INJ 3ML] 15 mL 2     Sig: ADMINISTER 52 UNITS UNDER THE SKIN EVERY DAY       Last Filled:      Patient Phone Number: [phone] (home)     Last appt: 11/19/2024   Next appt: 12/23/2024    Last Labs DM:   Lab Results   Component Value Date/Time    LABA1C 7.5 11/19/2024 08:58 AM

## 2024-12-23 NOTE — TELEPHONE ENCOUNTER
Pt calling requesting refill on   One Touch Ultra Blue Test Strips    Pharmacy info - [facility]# [phone]

## 2024-12-30 ENCOUNTER — OFFICE VISIT (OUTPATIENT)
Dept: FAMILY MEDICINE CLINIC | Age: 64
End: 2024-12-30

## 2024-12-30 VITALS
HEART RATE: 92 BPM | BODY MASS INDEX: 33.18 KG/M2 | OXYGEN SATURATION: 98 % | HEIGHT: 60 IN | DIASTOLIC BLOOD PRESSURE: 58 MMHG | SYSTOLIC BLOOD PRESSURE: 136 MMHG | WEIGHT: 169 LBS

## 2024-12-30 DIAGNOSIS — E78.5 DYSLIPIDEMIA: ICD-10-CM

## 2024-12-30 DIAGNOSIS — Z79.4 TYPE 2 DIABETES MELLITUS WITH HYPERGLYCEMIA, WITH LONG-TERM CURRENT USE OF INSULIN (HCC): ICD-10-CM

## 2024-12-30 DIAGNOSIS — E11.42 DIABETIC POLYNEUROPATHY ASSOCIATED WITH TYPE 2 DIABETES MELLITUS (HCC): ICD-10-CM

## 2024-12-30 DIAGNOSIS — I10 ESSENTIAL HYPERTENSION: ICD-10-CM

## 2024-12-30 DIAGNOSIS — E03.9 HYPOTHYROIDISM, UNSPECIFIED TYPE: Primary | ICD-10-CM

## 2024-12-30 DIAGNOSIS — E11.65 TYPE 2 DIABETES MELLITUS WITH HYPERGLYCEMIA, WITH LONG-TERM CURRENT USE OF INSULIN (HCC): ICD-10-CM

## 2024-12-30 DIAGNOSIS — B37.2 YEAST DERMATITIS: ICD-10-CM

## 2024-12-30 LAB
CREAT UR-MCNC: 29.1 MG/DL (ref 28–259)
MICROALBUMIN UR DL<=1MG/L-MCNC: 3.26 MG/DL
MICROALBUMIN/CREAT UR: 112 MG/G (ref 0–30)

## 2024-12-30 RX ORDER — NYSTATIN 100000 [USP'U]/G
POWDER TOPICAL
Qty: 60 G | Refills: 0 | Status: SHIPPED | OUTPATIENT
Start: 2024-12-30

## 2024-12-30 NOTE — PROGRESS NOTES
Justyna Villegas (:  1960) is a 64 y.o. female,Established patient, here for evaluation of the following chief complaint(s):  Hypertension         Assessment & Plan  Hypothyroidism, unspecified type   Chronic, at goal (stable), continue current treatment plan         Type 2 diabetes mellitus with hyperglycemia, with long-term current use of insulin (HCC)   Chronic, not at goal (unstable),  , medication adherence emphasized, and lifestyle modifications recommended         Hemoglobin A1C   Date Value Ref Range Status   2024 7.5 % Final       Dyslipidemia   Chronic, not at goal (unstable), medication adherence emphasized and lifestyle modifications recommended         Lab Results   Component Value Date    CHOL 258 (H) 2024    TRIG 370 (H) 2024    HDL 67 (H) 2024    LDL see below 2024    VLDL see below 2024       Diabetic polyneuropathy associated with type 2 diabetes mellitus (HCC)   Chronic, at goal (stable), continue current treatment plan         Essential hypertension   Chronic, at goal (stable), continue current treatment plan           No follow-ups on file.       Subjective   Justyna Villegas is a 64 y.o. female. Patient presents with:  Hypertension      1. Hypothyroidism, unspecified type  Patient has a history of hypothyroidism.  She particular medication regularly.  Having no significant complaint.  Patient denies any changes in her weight.  No changes in her energy level. Hemoglobin A1C     Lab Results       Component                Value               Date                       TSH                      0.85                2024                2. Type 2 diabetes mellitus with hyperglycemia, with long-term current use of insulin (HCC)  Patient's diabetes has been overall fair controlled.  Patient has been taking her medication regularly.  Denies any significant exacerbation of symptoms.  Denies any high or low blood sugars.  Denies any polyuria polyphagia

## 2024-12-30 NOTE — ASSESSMENT & PLAN NOTE
Chronic, not at goal (unstable), medication adherence emphasized and lifestyle modifications recommended         Lab Results   Component Value Date    CHOL 258 (H) 01/31/2024    TRIG 370 (H) 01/31/2024    HDL 67 (H) 01/31/2024    LDL see below 01/31/2024    VLDL see below 01/31/2024

## 2025-01-22 RX ORDER — OMEPRAZOLE 40 MG/1
40 CAPSULE, DELAYED RELEASE ORAL 2 TIMES DAILY
Qty: 60 CAPSULE | Refills: 5 | Status: SHIPPED | OUTPATIENT
Start: 2025-01-22

## 2025-01-22 NOTE — TELEPHONE ENCOUNTER
Last Office Visit  -  12/30/24  Next Office Visit  -  6/30/25    Last Filled  -    Last UDS -    Contract -

## 2025-02-19 ENCOUNTER — OFFICE VISIT (OUTPATIENT)
Dept: ENDOCRINOLOGY | Age: 65
End: 2025-02-19
Payer: COMMERCIAL

## 2025-02-19 VITALS
SYSTOLIC BLOOD PRESSURE: 130 MMHG | BODY MASS INDEX: 33.59 KG/M2 | OXYGEN SATURATION: 98 % | WEIGHT: 172 LBS | DIASTOLIC BLOOD PRESSURE: 72 MMHG | RESPIRATION RATE: 15 BRPM | HEART RATE: 89 BPM

## 2025-02-19 DIAGNOSIS — E11.9 CONTROLLED TYPE 2 DIABETES MELLITUS WITHOUT COMPLICATION, WITH LONG-TERM CURRENT USE OF INSULIN (HCC): ICD-10-CM

## 2025-02-19 DIAGNOSIS — E03.9 ACQUIRED HYPOTHYROIDISM: Primary | ICD-10-CM

## 2025-02-19 DIAGNOSIS — Z79.4 CONTROLLED TYPE 2 DIABETES MELLITUS WITHOUT COMPLICATION, WITH LONG-TERM CURRENT USE OF INSULIN (HCC): ICD-10-CM

## 2025-02-19 LAB — HBA1C MFR BLD: 8.1 %

## 2025-02-19 PROCEDURE — G8417 CALC BMI ABV UP PARAM F/U: HCPCS | Performed by: INTERNAL MEDICINE

## 2025-02-19 PROCEDURE — 3017F COLORECTAL CA SCREEN DOC REV: CPT | Performed by: INTERNAL MEDICINE

## 2025-02-19 PROCEDURE — 2022F DILAT RTA XM EVC RTNOPTHY: CPT | Performed by: INTERNAL MEDICINE

## 2025-02-19 PROCEDURE — 3046F HEMOGLOBIN A1C LEVEL >9.0%: CPT | Performed by: INTERNAL MEDICINE

## 2025-02-19 PROCEDURE — 3075F SYST BP GE 130 - 139MM HG: CPT | Performed by: INTERNAL MEDICINE

## 2025-02-19 PROCEDURE — 83036 HEMOGLOBIN GLYCOSYLATED A1C: CPT | Performed by: INTERNAL MEDICINE

## 2025-02-19 PROCEDURE — G8427 DOCREV CUR MEDS BY ELIG CLIN: HCPCS | Performed by: INTERNAL MEDICINE

## 2025-02-19 PROCEDURE — 3078F DIAST BP <80 MM HG: CPT | Performed by: INTERNAL MEDICINE

## 2025-02-19 PROCEDURE — 4004F PT TOBACCO SCREEN RCVD TLK: CPT | Performed by: INTERNAL MEDICINE

## 2025-02-19 PROCEDURE — 99214 OFFICE O/P EST MOD 30 MIN: CPT | Performed by: INTERNAL MEDICINE

## 2025-02-19 RX ORDER — BLOOD SUGAR DIAGNOSTIC
1 STRIP MISCELLANEOUS DAILY
Qty: 100 EACH | Refills: 3 | Status: SHIPPED | OUTPATIENT
Start: 2025-02-19

## 2025-02-19 RX ORDER — LISINOPRIL 5 MG/1
TABLET ORAL
Qty: 90 TABLET | Refills: 0 | Status: SHIPPED | OUTPATIENT
Start: 2025-02-19

## 2025-02-19 RX ORDER — ACYCLOVIR 800 MG/1
TABLET ORAL
Qty: 2 EACH | Refills: 3 | Status: SHIPPED | OUTPATIENT
Start: 2025-02-19

## 2025-02-19 RX ORDER — INSULIN GLARGINE 100 [IU]/ML
INJECTION, SOLUTION SUBCUTANEOUS
Qty: 30 ML | Refills: 3 | Status: SHIPPED | OUTPATIENT
Start: 2025-02-19

## 2025-02-19 RX ORDER — KETOROLAC TROMETHAMINE 30 MG/ML
INJECTION, SOLUTION INTRAMUSCULAR; INTRAVENOUS
Qty: 1 EACH | Refills: 0 | Status: SHIPPED | OUTPATIENT
Start: 2025-02-19

## 2025-02-19 NOTE — PROGRESS NOTES
Seen as patient for diabetes      Interim:    No issues  Stable glucose  Now taking 2 soda a day  Sister is sick    Trying to quit smoking    Stopped trulicity due to diarrhea    Diagnosed with Type 2 diabetes mellitus in    Known diabetic complications: microalbuminuria  controlled, moderate    Current diabetic medications     Lantus 70 units  Metformin 500mg BID  Jardiance    Last A1c 8.1%<--- 7.5%<----7%<----6.5%<---- 6.7%<----7.8%<---- 8.2%<-----  7.4%<-----7.8%<---- 10.8%<----- 9.5%<--- 11.9% <---- 7.1%    Prior visit with dietician: no  Current diet: on average, 3 meals per day  Drinks regular soda cut down from 6 to 0/day  Current exercise: walking   Current monitoring regimen: home blood tests -1  times per week    Has brought blood glucose log/meter:yes  Home blood sugar records: 133-257  Any episodes of hypoglycemia? ----  Worsened by high CHO    No Hx of CAD , PVD, CVA    Hyperlipidemia:   For   Years  Takes lipitor 40mg zetia  Controlled      Last eye exam: 10/24  Last foot exam:   She has microalbuminuria  Last microalbumin to creatinine ratio: 213 on ---> 120 on ---> --->112 on   Lisinopril 5mg    She is on levothyroxine, stable  Last TSH       Past Medical History:   Diagnosis Date    Anxiety     Bipolar disorder ([facility])     Brain aneurysm     Zucarello    Closed angle glaucoma     COPD (chronic obstructive pulmonary disease) ([facility])     Hyperlipidemia     Hypertension     Hypothyroidism     Migraines     Open-angle glaucoma of both eyes     RLS (restless legs syndrome)     Rotator cuff disorder     right shoulder    TIA (transient ischemic attack) 11    Tobacco use disorder     Type II or unspecified type diabetes mellitus without mention of complication, not stated as uncontrolled     diet controlled     Past Surgical History:   Procedure Laterality Date    BLADDER SUSPENSION      CARPAL TUNNEL RELEASE Bilateral     right and left     SECTION      x 2

## 2025-03-14 DIAGNOSIS — J44.9 CHRONIC OBSTRUCTIVE PULMONARY DISEASE, UNSPECIFIED COPD TYPE (HCC): ICD-10-CM

## 2025-03-14 RX ORDER — FLUTICASONE PROPIONATE AND SALMETEROL XINAFOATE 115; 21 UG/1; UG/1
2 AEROSOL, METERED RESPIRATORY (INHALATION) 2 TIMES DAILY
Qty: 12 G | Refills: 5 | OUTPATIENT
Start: 2025-03-14

## 2025-04-02 DIAGNOSIS — E11.00 UNCONTROLLED TYPE 2 DIABETES MELLITUS WITH HYPEROSMOLARITY WITHOUT COMA, WITHOUT LONG-TERM CURRENT USE OF INSULIN (HCC): ICD-10-CM

## 2025-04-02 RX ORDER — PRAVASTATIN SODIUM 40 MG
40 TABLET ORAL NIGHTLY
Qty: 30 TABLET | Refills: 5 | Status: SHIPPED | OUTPATIENT
Start: 2025-04-02

## 2025-04-02 RX ORDER — METFORMIN HYDROCHLORIDE 500 MG/1
500 TABLET, EXTENDED RELEASE ORAL 2 TIMES DAILY
Qty: 60 TABLET | Refills: 5 | Status: SHIPPED | OUTPATIENT
Start: 2025-04-02

## 2025-04-05 DIAGNOSIS — E03.9 HYPOTHYROIDISM, UNSPECIFIED TYPE: ICD-10-CM

## 2025-04-06 RX ORDER — LEVOTHYROXINE SODIUM 100 UG/1
100 TABLET ORAL DAILY
Qty: 30 TABLET | Refills: 3 | Status: SHIPPED | OUTPATIENT
Start: 2025-04-06

## 2025-04-26 NOTE — TELEPHONE ENCOUNTER
Last Office Visit  -  1/4/22  Next Office Visit  -  5/12/22    Last Filled  -    Last UDS -    Contract -
None known in lifetime

## 2025-05-07 DIAGNOSIS — E03.9 HYPOTHYROIDISM, UNSPECIFIED TYPE: ICD-10-CM

## 2025-05-07 RX ORDER — LEVOTHYROXINE SODIUM 100 UG/1
100 TABLET ORAL DAILY
Qty: 30 TABLET | Refills: 3 | Status: SHIPPED | OUTPATIENT
Start: 2025-05-07

## 2025-05-19 RX ORDER — LANCETS 33 GAUGE
EACH MISCELLANEOUS
Qty: 100 EACH | Refills: 3 | Status: SHIPPED | OUTPATIENT
Start: 2025-05-19

## 2025-05-20 DIAGNOSIS — E11.9 CONTROLLED TYPE 2 DIABETES MELLITUS WITHOUT COMPLICATION, WITH LONG-TERM CURRENT USE OF INSULIN (HCC): ICD-10-CM

## 2025-05-20 DIAGNOSIS — Z79.4 CONTROLLED TYPE 2 DIABETES MELLITUS WITHOUT COMPLICATION, WITH LONG-TERM CURRENT USE OF INSULIN (HCC): ICD-10-CM

## 2025-05-20 RX ORDER — LISINOPRIL 5 MG/1
5 TABLET ORAL DAILY
Qty: 90 TABLET | Refills: 0 | Status: SHIPPED | OUTPATIENT
Start: 2025-05-20

## 2025-05-20 NOTE — TELEPHONE ENCOUNTER
Medication:   Requested Prescriptions     Pending Prescriptions Disp Refills    lisinopril (PRINIVIL;ZESTRIL) 5 MG tablet [Pharmacy Med Name: LISINOPRIL 5MG TABLETS] 90 tablet 0     Sig: TAKE 1 TABLET BY MOUTH DAILY        Last Filled:      Patient Phone Number: [phone] (home)     Last appt: 2/19/2025   Next appt: 6/3/2025    Last OARRS:       4/10/2019     6:13 PM   RX Monitoring   Acute Pain Prescriptions Severe pain not adequately treated with lower dose.

## 2025-05-25 DIAGNOSIS — Z79.4 CONTROLLED TYPE 2 DIABETES MELLITUS WITHOUT COMPLICATION, WITH LONG-TERM CURRENT USE OF INSULIN (HCC): ICD-10-CM

## 2025-05-25 DIAGNOSIS — E11.9 CONTROLLED TYPE 2 DIABETES MELLITUS WITHOUT COMPLICATION, WITH LONG-TERM CURRENT USE OF INSULIN (HCC): ICD-10-CM

## 2025-05-27 RX ORDER — LISINOPRIL 5 MG/1
5 TABLET ORAL DAILY
Qty: 90 TABLET | Refills: 0 | OUTPATIENT
Start: 2025-05-27

## 2025-06-03 ENCOUNTER — OFFICE VISIT (OUTPATIENT)
Dept: ENDOCRINOLOGY | Age: 65
End: 2025-06-03
Payer: COMMERCIAL

## 2025-06-03 VITALS
OXYGEN SATURATION: 96 % | BODY MASS INDEX: 32.81 KG/M2 | HEART RATE: 97 BPM | DIASTOLIC BLOOD PRESSURE: 62 MMHG | SYSTOLIC BLOOD PRESSURE: 140 MMHG | WEIGHT: 168 LBS

## 2025-06-03 DIAGNOSIS — E11.9 CONTROLLED TYPE 2 DIABETES MELLITUS WITHOUT COMPLICATION, WITH LONG-TERM CURRENT USE OF INSULIN (HCC): ICD-10-CM

## 2025-06-03 DIAGNOSIS — Z79.4 CONTROLLED TYPE 2 DIABETES MELLITUS WITHOUT COMPLICATION, WITH LONG-TERM CURRENT USE OF INSULIN (HCC): ICD-10-CM

## 2025-06-03 DIAGNOSIS — R80.9 MICROALBUMINURIA: Primary | ICD-10-CM

## 2025-06-03 PROCEDURE — 3077F SYST BP >= 140 MM HG: CPT | Performed by: INTERNAL MEDICINE

## 2025-06-03 PROCEDURE — 2022F DILAT RTA XM EVC RTNOPTHY: CPT | Performed by: INTERNAL MEDICINE

## 2025-06-03 PROCEDURE — 83036 HEMOGLOBIN GLYCOSYLATED A1C: CPT | Performed by: INTERNAL MEDICINE

## 2025-06-03 PROCEDURE — 3078F DIAST BP <80 MM HG: CPT | Performed by: INTERNAL MEDICINE

## 2025-06-03 PROCEDURE — 4004F PT TOBACCO SCREEN RCVD TLK: CPT | Performed by: INTERNAL MEDICINE

## 2025-06-03 PROCEDURE — G8427 DOCREV CUR MEDS BY ELIG CLIN: HCPCS | Performed by: INTERNAL MEDICINE

## 2025-06-03 PROCEDURE — 3017F COLORECTAL CA SCREEN DOC REV: CPT | Performed by: INTERNAL MEDICINE

## 2025-06-03 PROCEDURE — 3052F HG A1C>EQUAL 8.0%<EQUAL 9.0%: CPT | Performed by: INTERNAL MEDICINE

## 2025-06-03 PROCEDURE — 95251 CONT GLUC MNTR ANALYSIS I&R: CPT | Performed by: INTERNAL MEDICINE

## 2025-06-03 PROCEDURE — G8417 CALC BMI ABV UP PARAM F/U: HCPCS | Performed by: INTERNAL MEDICINE

## 2025-06-03 PROCEDURE — 99214 OFFICE O/P EST MOD 30 MIN: CPT | Performed by: INTERNAL MEDICINE

## 2025-06-03 RX ORDER — ACYCLOVIR 800 MG/1
TABLET ORAL
Qty: 2 EACH | Refills: 3 | Status: SHIPPED | OUTPATIENT
Start: 2025-06-03

## 2025-06-03 NOTE — PROGRESS NOTES
by Does not apply route daily 1 kit 0    fluorometholone (FML) 0.1 % ophthalmic suspension       azelastine (ASTELIN) 0.1 % nasal spray 2 sprays by Nasal route 2 times daily Use in each nostril as directed (Patient not taking: Reported on 6/3/2025) 2 Bottle 5    B Complex Vitamins (VITAMIN B COMPLEX PO) Take by mouth      estradiol (ESTRACE) 1 MG tablet       albuterol (PROVENTIL) (2.5 MG/3ML) 0.083% nebulizer solution Take 3 mLs by nebulization every 4 hours as needed for Wheezing 375 mL 5    venlafaxine (EFFEXOR-XR) 75 MG XR capsule Take 2 capsules by mouth See Admin Instructions 150mg in morning, 75mg at night      lamoTRIgine (LAMICTAL) 100 MG tablet Take 1 tablet by mouth 2 times daily       No current facility-administered medications for this visit.       Review of Systems  Please see scanned document dated and signed      Objective:      BP (!) 140/62   Pulse 97   Wt 76.2 kg (168 lb)   LMP  (LMP Unknown)   SpO2 96%   BMI 32.81 kg/m²   Wt Readings from Last 3 Encounters:   06/03/25 76.2 kg (168 lb)   02/19/25 78 kg (172 lb)   12/30/24 76.7 kg (169 lb)     Vitals:    06/03/25 1057   BP: (!) 140/62   Pulse: 97   SpO2: 96%       Constitutional: Well-developed, appears stated age, cooperative, in no acute distress  H/E/N/M/T:atraumatic, normocephalic, external ears, nose, lips normal without lesions  Eyes: Lids, lashes, conjunctivae and sclerae normal, No proptosis, no redness  Neck: supple, symmetrical, no swelling  Skin: No obvious rashes or lesions present.  Skin and hair texture normal  Psychiatric: Judgement and Insight:  judgement and insight appear normal  Neuro: Normal without focal findings, speech is normal normal, speech is spontaneous  Chest: No labored breathing, no chest deformity, no stridor  Musculoskeletal: No joint deformity, swelling    4/24 Foot exam:  Monofilament detected , no ulcers    Lab Reviewed   No components found for: \"[facility]\"  Lab Results   Component Value Date    TRIG 370 (H)

## 2025-06-18 RX ORDER — OMEPRAZOLE 40 MG/1
40 CAPSULE, DELAYED RELEASE ORAL 2 TIMES DAILY
Qty: 60 CAPSULE | Refills: 5 | Status: SHIPPED | OUTPATIENT
Start: 2025-06-18

## 2025-06-18 NOTE — TELEPHONE ENCOUNTER
Last Office Visit  -  12/30/24  Next Office Visit  -  6/0/25    Last Filled  -    Last UDS -    Contract -

## 2025-06-20 DIAGNOSIS — E11.9 CONTROLLED TYPE 2 DIABETES MELLITUS WITHOUT COMPLICATION, WITH LONG-TERM CURRENT USE OF INSULIN (HCC): ICD-10-CM

## 2025-06-20 DIAGNOSIS — Z79.4 CONTROLLED TYPE 2 DIABETES MELLITUS WITHOUT COMPLICATION, WITH LONG-TERM CURRENT USE OF INSULIN (HCC): ICD-10-CM

## 2025-06-23 RX ORDER — INSULIN GLARGINE 100 [IU]/ML
INJECTION, SOLUTION SUBCUTANEOUS
Qty: 30 ML | Refills: 1 | Status: SHIPPED | OUTPATIENT
Start: 2025-06-23

## 2025-06-30 ENCOUNTER — OFFICE VISIT (OUTPATIENT)
Dept: FAMILY MEDICINE CLINIC | Age: 65
End: 2025-06-30
Payer: COMMERCIAL

## 2025-06-30 VITALS
DIASTOLIC BLOOD PRESSURE: 46 MMHG | HEIGHT: 60 IN | WEIGHT: 160 LBS | OXYGEN SATURATION: 97 % | BODY MASS INDEX: 31.41 KG/M2 | SYSTOLIC BLOOD PRESSURE: 120 MMHG | HEART RATE: 91 BPM

## 2025-06-30 DIAGNOSIS — E11.65 TYPE 2 DIABETES MELLITUS WITH HYPERGLYCEMIA, WITH LONG-TERM CURRENT USE OF INSULIN (HCC): ICD-10-CM

## 2025-06-30 DIAGNOSIS — E03.9 HYPOTHYROIDISM, UNSPECIFIED TYPE: Primary | ICD-10-CM

## 2025-06-30 DIAGNOSIS — E03.9 HYPOTHYROIDISM, UNSPECIFIED TYPE: ICD-10-CM

## 2025-06-30 DIAGNOSIS — Z79.4 TYPE 2 DIABETES MELLITUS WITH HYPERGLYCEMIA, WITH LONG-TERM CURRENT USE OF INSULIN (HCC): ICD-10-CM

## 2025-06-30 DIAGNOSIS — R19.5 LOOSE STOOLS: ICD-10-CM

## 2025-06-30 DIAGNOSIS — Z72.0 TOBACCO ABUSE: ICD-10-CM

## 2025-06-30 DIAGNOSIS — J43.2 CENTRILOBULAR EMPHYSEMA (HCC): ICD-10-CM

## 2025-06-30 LAB
ALBUMIN SERPL-MCNC: 4.6 G/DL (ref 3.4–5)
ALBUMIN/GLOB SERPL: 1.8 {RATIO} (ref 1.1–2.2)
ALP SERPL-CCNC: 95 U/L (ref 40–129)
ALT SERPL-CCNC: 31 U/L (ref 10–40)
ANION GAP SERPL CALCULATED.3IONS-SCNC: 17 MMOL/L (ref 3–16)
AST SERPL-CCNC: 26 U/L (ref 15–37)
BILIRUB SERPL-MCNC: <0.2 MG/DL (ref 0–1)
BUN SERPL-MCNC: 14 MG/DL (ref 7–20)
CALCIUM SERPL-MCNC: 10.5 MG/DL (ref 8.3–10.6)
CHLORIDE SERPL-SCNC: 98 MMOL/L (ref 99–110)
CHOLEST SERPL-MCNC: 203 MG/DL (ref 0–199)
CO2 SERPL-SCNC: 18 MMOL/L (ref 21–32)
CREAT SERPL-MCNC: 0.8 MG/DL (ref 0.6–1.2)
CREAT UR-MCNC: 31.8 MG/DL (ref 28–259)
GFR SERPLBLD CREATININE-BSD FMLA CKD-EPI: 83 ML/MIN/{1.73_M2}
GLUCOSE SERPL-MCNC: 208 MG/DL (ref 70–99)
HDLC SERPL-MCNC: 59 MG/DL (ref 40–60)
LDL CHOLESTEROL: 85 MG/DL
MICROALBUMIN UR DL<=1MG/L-MCNC: 1.62 MG/DL
MICROALBUMIN/CREAT UR: 50.9 MG/G (ref 0–30)
POTASSIUM SERPL-SCNC: 4.2 MMOL/L (ref 3.5–5.1)
PROT SERPL-MCNC: 7.2 G/DL (ref 6.4–8.2)
SODIUM SERPL-SCNC: 133 MMOL/L (ref 136–145)
T4 FREE SERPL-MCNC: 1.1 NG/DL (ref 0.9–1.8)
TRIGL SERPL-MCNC: 294 MG/DL (ref 0–150)
TSH SERPL DL<=0.005 MIU/L-ACNC: 0.2 UIU/ML (ref 0.27–4.2)
VLDLC SERPL CALC-MCNC: 59 MG/DL

## 2025-06-30 PROCEDURE — 3017F COLORECTAL CA SCREEN DOC REV: CPT | Performed by: FAMILY MEDICINE

## 2025-06-30 PROCEDURE — 3078F DIAST BP <80 MM HG: CPT | Performed by: FAMILY MEDICINE

## 2025-06-30 PROCEDURE — 3074F SYST BP LT 130 MM HG: CPT | Performed by: FAMILY MEDICINE

## 2025-06-30 PROCEDURE — 2022F DILAT RTA XM EVC RTNOPTHY: CPT | Performed by: FAMILY MEDICINE

## 2025-06-30 PROCEDURE — 3052F HG A1C>EQUAL 8.0%<EQUAL 9.0%: CPT | Performed by: FAMILY MEDICINE

## 2025-06-30 PROCEDURE — 99214 OFFICE O/P EST MOD 30 MIN: CPT | Performed by: FAMILY MEDICINE

## 2025-06-30 PROCEDURE — G8417 CALC BMI ABV UP PARAM F/U: HCPCS | Performed by: FAMILY MEDICINE

## 2025-06-30 PROCEDURE — 3023F SPIROM DOC REV: CPT | Performed by: FAMILY MEDICINE

## 2025-06-30 PROCEDURE — 4004F PT TOBACCO SCREEN RCVD TLK: CPT | Performed by: FAMILY MEDICINE

## 2025-06-30 PROCEDURE — G8427 DOCREV CUR MEDS BY ELIG CLIN: HCPCS | Performed by: FAMILY MEDICINE

## 2025-06-30 RX ORDER — CALCIUM POLYCARBOPHIL 625 MG
625 TABLET ORAL DAILY
Qty: 14 TABLET | Refills: 0 | Status: SHIPPED | OUTPATIENT
Start: 2025-06-30

## 2025-06-30 SDOH — ECONOMIC STABILITY: FOOD INSECURITY: WITHIN THE PAST 12 MONTHS, THE FOOD YOU BOUGHT JUST DIDN'T LAST AND YOU DIDN'T HAVE MONEY TO GET MORE.: SOMETIMES TRUE

## 2025-06-30 SDOH — ECONOMIC STABILITY: FOOD INSECURITY: WITHIN THE PAST 12 MONTHS, YOU WORRIED THAT YOUR FOOD WOULD RUN OUT BEFORE YOU GOT MONEY TO BUY MORE.: SOMETIMES TRUE

## 2025-06-30 ASSESSMENT — PATIENT HEALTH QUESTIONNAIRE - PHQ9
6. FEELING BAD ABOUT YOURSELF - OR THAT YOU ARE A FAILURE OR HAVE LET YOURSELF OR YOUR FAMILY DOWN: NOT AT ALL
4. FEELING TIRED OR HAVING LITTLE ENERGY: SEVERAL DAYS
SUM OF ALL RESPONSES TO PHQ QUESTIONS 1-9: 1
SUM OF ALL RESPONSES TO PHQ QUESTIONS 1-9: 1
1. LITTLE INTEREST OR PLEASURE IN DOING THINGS: NOT AT ALL
SUM OF ALL RESPONSES TO PHQ QUESTIONS 1-9: 1
7. TROUBLE CONCENTRATING ON THINGS, SUCH AS READING THE NEWSPAPER OR WATCHING TELEVISION: NOT AT ALL
10. IF YOU CHECKED OFF ANY PROBLEMS, HOW DIFFICULT HAVE THESE PROBLEMS MADE IT FOR YOU TO DO YOUR WORK, TAKE CARE OF THINGS AT HOME, OR GET ALONG WITH OTHER PEOPLE: NOT DIFFICULT AT ALL
5. POOR APPETITE OR OVEREATING: NOT AT ALL
9. THOUGHTS THAT YOU WOULD BE BETTER OFF DEAD, OR OF HURTING YOURSELF: NOT AT ALL
SUM OF ALL RESPONSES TO PHQ QUESTIONS 1-9: 1
2. FEELING DOWN, DEPRESSED OR HOPELESS: NOT AT ALL
3. TROUBLE FALLING OR STAYING ASLEEP: NOT AT ALL
8. MOVING OR SPEAKING SO SLOWLY THAT OTHER PEOPLE COULD HAVE NOTICED. OR THE OPPOSITE, BEING SO FIGETY OR RESTLESS THAT YOU HAVE BEEN MOVING AROUND A LOT MORE THAN USUAL: NOT AT ALL

## 2025-06-30 NOTE — PROGRESS NOTES
Neurological:      Mental Status: She is alert and oriented to person, place, and time.      Deep Tendon Reflexes: Reflexes are normal and symmetric.   Psychiatric:         Behavior: Behavior normal.         Thought Content: Thought content normal.         Judgment: Judgment normal.                  An electronic signature was used to authenticate this note.    --Edin Carr MD

## 2025-07-01 ENCOUNTER — RESULTS FOLLOW-UP (OUTPATIENT)
Dept: FAMILY MEDICINE CLINIC | Age: 65
End: 2025-07-01

## 2025-07-14 DIAGNOSIS — D50.9 IRON DEFICIENCY ANEMIA, UNSPECIFIED IRON DEFICIENCY ANEMIA TYPE: ICD-10-CM

## 2025-07-14 RX ORDER — FERROUS GLUCONATE 324(38)MG
1 TABLET ORAL 2 TIMES DAILY
Qty: 60 TABLET | Refills: 3 | Status: SHIPPED | OUTPATIENT
Start: 2025-07-14

## 2025-07-17 ENCOUNTER — TELEPHONE (OUTPATIENT)
Dept: TELEMETRY | Age: 65
End: 2025-07-17

## 2025-07-17 DIAGNOSIS — Z12.2 SCREENING FOR LUNG CANCER: Primary | ICD-10-CM

## 2025-07-17 NOTE — TELEPHONE ENCOUNTER
Pt due for Annual CT Lung Screening, reminder letter mailed, Central Scheduling phone number provided.    CT Lung Screening order has been pended to chart should you choose to sign it.    Traci Barrett RN

## 2025-09-03 ENCOUNTER — OFFICE VISIT (OUTPATIENT)
Dept: ENDOCRINOLOGY | Age: 65
End: 2025-09-03
Payer: COMMERCIAL

## 2025-09-03 VITALS
SYSTOLIC BLOOD PRESSURE: 136 MMHG | BODY MASS INDEX: 32.03 KG/M2 | HEART RATE: 86 BPM | OXYGEN SATURATION: 98 % | WEIGHT: 164 LBS | DIASTOLIC BLOOD PRESSURE: 86 MMHG

## 2025-09-03 DIAGNOSIS — R80.9 MICROALBUMINURIA: ICD-10-CM

## 2025-09-03 DIAGNOSIS — Z79.4 CONTROLLED TYPE 2 DIABETES MELLITUS WITHOUT COMPLICATION, WITH LONG-TERM CURRENT USE OF INSULIN (HCC): Primary | ICD-10-CM

## 2025-09-03 DIAGNOSIS — E11.9 CONTROLLED TYPE 2 DIABETES MELLITUS WITHOUT COMPLICATION, WITH LONG-TERM CURRENT USE OF INSULIN (HCC): Primary | ICD-10-CM

## 2025-09-03 LAB — HBA1C MFR BLD: 6.6 %

## 2025-09-03 PROCEDURE — 2022F DILAT RTA XM EVC RTNOPTHY: CPT | Performed by: INTERNAL MEDICINE

## 2025-09-03 PROCEDURE — 3079F DIAST BP 80-89 MM HG: CPT | Performed by: INTERNAL MEDICINE

## 2025-09-03 PROCEDURE — 95251 CONT GLUC MNTR ANALYSIS I&R: CPT | Performed by: INTERNAL MEDICINE

## 2025-09-03 PROCEDURE — 3052F HG A1C>EQUAL 8.0%<EQUAL 9.0%: CPT | Performed by: INTERNAL MEDICINE

## 2025-09-03 PROCEDURE — G8427 DOCREV CUR MEDS BY ELIG CLIN: HCPCS | Performed by: INTERNAL MEDICINE

## 2025-09-03 PROCEDURE — 3017F COLORECTAL CA SCREEN DOC REV: CPT | Performed by: INTERNAL MEDICINE

## 2025-09-03 PROCEDURE — 99214 OFFICE O/P EST MOD 30 MIN: CPT | Performed by: INTERNAL MEDICINE

## 2025-09-03 PROCEDURE — 3075F SYST BP GE 130 - 139MM HG: CPT | Performed by: INTERNAL MEDICINE

## 2025-09-03 PROCEDURE — 4004F PT TOBACCO SCREEN RCVD TLK: CPT | Performed by: INTERNAL MEDICINE

## 2025-09-03 PROCEDURE — 83036 HEMOGLOBIN GLYCOSYLATED A1C: CPT | Performed by: INTERNAL MEDICINE

## 2025-09-03 PROCEDURE — G8417 CALC BMI ABV UP PARAM F/U: HCPCS | Performed by: INTERNAL MEDICINE

## (undated) DEVICE — X-RAY DETECTABLE SPONGES,16 PLY: Brand: VISTEC

## (undated) DEVICE — NEEDLE HYPO 25GA L1.5IN BLU POLYPR HUB S STL REG BVL STR

## (undated) DEVICE — GOWN SIRUS NONREIN XL W/TWL: Brand: MEDLINE INDUSTRIES, INC.

## (undated) DEVICE — GAUZE,PACKING STRIP,IODOFORM,1"X5YD,STRL: Brand: CURAD

## (undated) DEVICE — MAJOR SET UP PK

## (undated) DEVICE — MEDI-VAC NON-CONDUCTIVE SUCTION TUBING: Brand: CARDINAL HEALTH

## (undated) DEVICE — GLOVE ORANGE PI 7 1/2   MSG9075

## (undated) DEVICE — CYSTO/BLADDER IRRIGATION SET, REGULATING CLAMP

## (undated) DEVICE — SOLUTION IV IRRIG 500ML 0.9% SODIUM CHL 2F7123

## (undated) DEVICE — SUTURE PERMAHAND SZ 2-0 L18IN NONABSORBABLE BLK L26MM SH C012D

## (undated) DEVICE — CIRCUIT ANES L72IN 3L BACT AND VIR FLTR EL CONN SGL LIMB

## (undated) DEVICE — CURITY IDOFORM PACKING STRIP: Brand: CURITY

## (undated) DEVICE — SUTURE VCRL SZ 3-0 L18IN ABSRB UD L26MM SH 1/2 CIR J864D

## (undated) DEVICE — ELECTRODE PT RET AD L9FT HI MOIST COND ADH HYDRGEL CORDED

## (undated) DEVICE — SYRINGE MED 10ML LUERLOCK TIP W/O SFTY DISP

## (undated) DEVICE — GAUZE,PACKING STRIP,IODOFORM,2"X5YD,STRL: Brand: CURAD

## (undated) DEVICE — SUTURE VCRL SZ 2-0 L18IN ABSRB VLT L26MM SH 1/2 CIR J775D

## (undated) DEVICE — PACK PROC LAP II AURORA

## (undated) DEVICE — SUTURE VCRL + SZ 3-0 L27IN ABSRB UD L26MM SH 1/2 CIR VCP416H

## (undated) DEVICE — TRAY CATH 16FR F INCLUDE LUB DRNGE BG STATLOK STBL DEV

## (undated) DEVICE — Z DUP USE 2522782 SOLUTION IRRIG 1000ML STRL H2O PLAS CONTAINER UROMATIC